# Patient Record
Sex: FEMALE | Race: WHITE | Employment: OTHER | URBAN - METROPOLITAN AREA
[De-identification: names, ages, dates, MRNs, and addresses within clinical notes are randomized per-mention and may not be internally consistent; named-entity substitution may affect disease eponyms.]

---

## 2017-02-02 PROBLEM — E11.9 TYPE 2 DIABETES MELLITUS WITHOUT COMPLICATION, WITHOUT LONG-TERM CURRENT USE OF INSULIN (HCC): Status: ACTIVE | Noted: 2017-02-02

## 2017-08-03 PROBLEM — E66.01 MORBID OBESITY WITH BMI OF 40.0-44.9, ADULT (HCC): Status: ACTIVE | Noted: 2017-08-03

## 2018-02-08 PROBLEM — M1A.00X0 IDIOPATHIC CHRONIC GOUT WITHOUT TOPHUS: Status: ACTIVE | Noted: 2018-02-08

## 2018-02-08 PROBLEM — I10 ESSENTIAL HYPERTENSION: Status: ACTIVE | Noted: 2018-02-08

## 2018-04-26 ENCOUNTER — TELEPHONE (OUTPATIENT)
Dept: FAMILY MEDICINE CLINIC | Age: 63
End: 2018-04-26

## 2018-04-26 DIAGNOSIS — R10.30 LOWER ABDOMINAL PAIN: ICD-10-CM

## 2018-04-26 DIAGNOSIS — R19.5 POSITIVE FIT (FECAL IMMUNOCHEMICAL TEST): ICD-10-CM

## 2018-04-26 DIAGNOSIS — Z85.43 HISTORY OF OVARIAN CANCER: ICD-10-CM

## 2018-04-26 DIAGNOSIS — R10.30 LOWER ABDOMINAL PAIN: Primary | ICD-10-CM

## 2018-04-26 DIAGNOSIS — R19.5 POSITIVE FIT (FECAL IMMUNOCHEMICAL TEST): Primary | ICD-10-CM

## 2018-04-26 DIAGNOSIS — Z85.43 HX OF OVARIAN CANCER: ICD-10-CM

## 2018-04-26 DIAGNOSIS — Z12.11 SCREENING FOR COLORECTAL CANCER: ICD-10-CM

## 2018-04-26 DIAGNOSIS — Z12.12 SCREENING FOR COLORECTAL CANCER: ICD-10-CM

## 2018-04-26 LAB
CONTROL: PRESENT
HEMOCCULT STL QL: POSITIVE

## 2018-04-26 PROCEDURE — 82274 ASSAY TEST FOR BLOOD FECAL: CPT | Performed by: FAMILY MEDICINE

## 2018-04-30 ENCOUNTER — OFFICE VISIT (OUTPATIENT)
Dept: FAMILY MEDICINE CLINIC | Age: 63
End: 2018-04-30
Payer: COMMERCIAL

## 2018-04-30 VITALS
BODY MASS INDEX: 41.65 KG/M2 | OXYGEN SATURATION: 98 % | HEART RATE: 74 BPM | WEIGHT: 250 LBS | SYSTOLIC BLOOD PRESSURE: 132 MMHG | HEIGHT: 65 IN | DIASTOLIC BLOOD PRESSURE: 80 MMHG | RESPIRATION RATE: 20 BRPM

## 2018-04-30 DIAGNOSIS — R19.5 POSITIVE FIT (FECAL IMMUNOCHEMICAL TEST): Primary | ICD-10-CM

## 2018-04-30 PROCEDURE — 99213 OFFICE O/P EST LOW 20 MIN: CPT | Performed by: FAMILY MEDICINE

## 2018-04-30 ASSESSMENT — ENCOUNTER SYMPTOMS
NAUSEA: 0
BLOOD IN STOOL: 0
ABDOMINAL PAIN: 0
DIARRHEA: 0
VOMITING: 0
SHORTNESS OF BREATH: 0

## 2018-05-30 ENCOUNTER — ANESTHESIA EVENT (OUTPATIENT)
Dept: ENDOSCOPY | Age: 63
End: 2018-05-30
Payer: COMMERCIAL

## 2018-05-30 ENCOUNTER — HOSPITAL ENCOUNTER (OUTPATIENT)
Age: 63
Setting detail: OUTPATIENT SURGERY
Discharge: HOME | End: 2018-05-30
Attending: INTERNAL MEDICINE | Admitting: INTERNAL MEDICINE
Payer: COMMERCIAL

## 2018-05-30 ENCOUNTER — ANESTHESIA (OUTPATIENT)
Dept: ENDOSCOPY | Age: 63
End: 2018-05-30
Payer: COMMERCIAL

## 2018-05-30 VITALS
WEIGHT: 250 LBS | HEART RATE: 67 BPM | TEMPERATURE: 97.8 F | BODY MASS INDEX: 41.65 KG/M2 | OXYGEN SATURATION: 96 % | HEIGHT: 65 IN | SYSTOLIC BLOOD PRESSURE: 141 MMHG | RESPIRATION RATE: 16 BRPM | DIASTOLIC BLOOD PRESSURE: 67 MMHG

## 2018-05-30 VITALS
OXYGEN SATURATION: 99 % | RESPIRATION RATE: 17 BRPM | DIASTOLIC BLOOD PRESSURE: 79 MMHG | SYSTOLIC BLOOD PRESSURE: 158 MMHG

## 2018-05-30 LAB — METER GLUCOSE: 155 MG/DL (ref 70–110)

## 2018-05-30 PROCEDURE — 6360000002 HC RX W HCPCS: Performed by: NURSE ANESTHETIST, CERTIFIED REGISTERED

## 2018-05-30 PROCEDURE — 2580000003 HC RX 258: Performed by: ANESTHESIOLOGY

## 2018-05-30 PROCEDURE — 7100000011 HC PHASE II RECOVERY - ADDTL 15 MIN: Performed by: INTERNAL MEDICINE

## 2018-05-30 PROCEDURE — 3700000001 HC ADD 15 MINUTES (ANESTHESIA): Performed by: INTERNAL MEDICINE

## 2018-05-30 PROCEDURE — 2709999900 HC NON-CHARGEABLE SUPPLY: Performed by: INTERNAL MEDICINE

## 2018-05-30 PROCEDURE — 82962 GLUCOSE BLOOD TEST: CPT

## 2018-05-30 PROCEDURE — 7100000010 HC PHASE II RECOVERY - FIRST 15 MIN: Performed by: INTERNAL MEDICINE

## 2018-05-30 PROCEDURE — 3700000000 HC ANESTHESIA ATTENDED CARE: Performed by: INTERNAL MEDICINE

## 2018-05-30 PROCEDURE — 2720000010 HC SURG SUPPLY STERILE: Performed by: INTERNAL MEDICINE

## 2018-05-30 PROCEDURE — 3609027000 HC COLONOSCOPY: Performed by: INTERNAL MEDICINE

## 2018-05-30 PROCEDURE — C1773 RET DEV, INSERTABLE: HCPCS | Performed by: INTERNAL MEDICINE

## 2018-05-30 RX ORDER — SODIUM CHLORIDE 0.9 % (FLUSH) 0.9 %
10 SYRINGE (ML) INJECTION EVERY 12 HOURS SCHEDULED
Status: DISCONTINUED | OUTPATIENT
Start: 2018-05-30 | End: 2018-05-30 | Stop reason: HOSPADM

## 2018-05-30 RX ORDER — SODIUM CHLORIDE 9 MG/ML
INJECTION, SOLUTION INTRAVENOUS CONTINUOUS
Status: DISCONTINUED | OUTPATIENT
Start: 2018-05-30 | End: 2018-05-30 | Stop reason: HOSPADM

## 2018-05-30 RX ORDER — PROPOFOL 10 MG/ML
INJECTION, EMULSION INTRAVENOUS PRN
Status: DISCONTINUED | OUTPATIENT
Start: 2018-05-30 | End: 2018-05-30 | Stop reason: SDUPTHER

## 2018-05-30 RX ORDER — SODIUM CHLORIDE 0.9 % (FLUSH) 0.9 %
10 SYRINGE (ML) INJECTION PRN
Status: DISCONTINUED | OUTPATIENT
Start: 2018-05-30 | End: 2018-05-30 | Stop reason: HOSPADM

## 2018-05-30 RX ADMIN — PROPOFOL 400 MG: 10 INJECTION, EMULSION INTRAVENOUS at 08:45

## 2018-05-30 RX ADMIN — SODIUM CHLORIDE: 9 INJECTION, SOLUTION INTRAVENOUS at 08:16

## 2018-05-30 RX ADMIN — SODIUM CHLORIDE: 9 INJECTION, SOLUTION INTRAVENOUS at 07:24

## 2018-05-30 ASSESSMENT — PAIN SCALES - GENERAL
PAINLEVEL_OUTOF10: 0
PAINLEVEL_OUTOF10: 0

## 2018-08-23 ENCOUNTER — OFFICE VISIT (OUTPATIENT)
Dept: FAMILY MEDICINE CLINIC | Age: 63
End: 2018-08-23
Payer: COMMERCIAL

## 2018-08-23 VITALS
SYSTOLIC BLOOD PRESSURE: 138 MMHG | HEART RATE: 78 BPM | DIASTOLIC BLOOD PRESSURE: 78 MMHG | BODY MASS INDEX: 41.6 KG/M2 | OXYGEN SATURATION: 98 % | RESPIRATION RATE: 20 BRPM | HEIGHT: 65 IN

## 2018-08-23 DIAGNOSIS — E03.9 HYPOTHYROIDISM (ACQUIRED): ICD-10-CM

## 2018-08-23 DIAGNOSIS — E11.9 TYPE 2 DIABETES MELLITUS WITHOUT COMPLICATION, WITHOUT LONG-TERM CURRENT USE OF INSULIN (HCC): Primary | ICD-10-CM

## 2018-08-23 DIAGNOSIS — I10 ESSENTIAL HYPERTENSION: ICD-10-CM

## 2018-08-23 LAB — HBA1C MFR BLD: 7.5 %

## 2018-08-23 PROCEDURE — 99214 OFFICE O/P EST MOD 30 MIN: CPT | Performed by: FAMILY MEDICINE

## 2018-08-23 PROCEDURE — 83036 HEMOGLOBIN GLYCOSYLATED A1C: CPT | Performed by: FAMILY MEDICINE

## 2018-08-23 RX ORDER — METRONIDAZOLE 7.5 MG/G
LOTION TOPICAL
Qty: 59 ML | Refills: 5 | Status: SHIPPED | OUTPATIENT
Start: 2018-08-23 | End: 2018-10-03 | Stop reason: SDUPTHER

## 2018-08-23 ASSESSMENT — PATIENT HEALTH QUESTIONNAIRE - PHQ9
2. FEELING DOWN, DEPRESSED OR HOPELESS: 0
SUM OF ALL RESPONSES TO PHQ QUESTIONS 1-9: 0
SUM OF ALL RESPONSES TO PHQ QUESTIONS 1-9: 0
SUM OF ALL RESPONSES TO PHQ9 QUESTIONS 1 & 2: 0
1. LITTLE INTEREST OR PLEASURE IN DOING THINGS: 0

## 2018-08-23 ASSESSMENT — ENCOUNTER SYMPTOMS
DIARRHEA: 0
SHORTNESS OF BREATH: 0
VOMITING: 0
NAUSEA: 0

## 2018-08-23 NOTE — PROGRESS NOTES
200 mcg by mouth daily 400 mcg Monday-Thursday, 200 mcg other days      losartan-hydrochlorothiazide (HYZAAR) 100-25 MG per tablet   Take 1 tablet by mouth daily   0    vitamin D (ERGOCALCIFEROL) 21450 UNITS CAPS capsule   Take 50,000 Units by mouth once a week   0    FREESTYLE LITE strip   0    FREESTYLE LANCETS MISC   1    atorvastatin (LIPITOR) 20 MG tablet   Take 20 mg by mouth daily        No current facility-administered medications for this visit. Wt Readings from Last 3 Encounters:   05/29/18 250 lb (113.4 kg)   04/30/18 250 lb (113.4 kg)   02/08/18 250 lb (113.4 kg)     /78 (Site: Left Arm, Cuff Size: Large Adult)   Pulse 78   Resp 20   Ht 5' 5\" (1.651 m)   SpO2 98%   BMI 41.60 kg/m²     Review of Systems   Eyes: Negative for visual disturbance. Respiratory: Negative for shortness of breath. Cardiovascular: Negative for chest pain, palpitations and leg swelling. Gastrointestinal: Negative for diarrhea, nausea and vomiting. Genitourinary: Negative for difficulty urinating, dysuria and frequency. Skin: Positive for rash (eczema on ankles). Psychiatric/Behavioral: Negative for dysphoric mood. Physical Exam   Constitutional: She is oriented to person, place, and time. She appears well-developed and well-nourished. No distress. Neck: Neck supple. Carotid bruit is not present. Cardiovascular: Normal rate, regular rhythm and normal heart sounds. Exam reveals no gallop. No murmur heard. Pulmonary/Chest: Effort normal and breath sounds normal. She has no wheezes. She has no rales. Abdominal: Soft. Bowel sounds are normal. She exhibits no distension. There is no tenderness. Musculoskeletal: She exhibits no edema. Neurological: She is alert and oriented to person, place, and time. Skin: Skin is warm and dry. Psychiatric: She has a normal mood and affect. Vitals reviewed.     Results for orders placed or performed in visit on 08/23/18   POCT glycosylated is important in assessing for undiagnosed pathology, especially cancer, as well as protecting against potentially harmful/life threatening disease. Patient and/or guardian verbalizes understanding and agrees with above counseling, assessment and plan. All questions answered.

## 2018-09-12 ENCOUNTER — OFFICE VISIT (OUTPATIENT)
Dept: FAMILY MEDICINE CLINIC | Age: 63
End: 2018-09-12
Payer: COMMERCIAL

## 2018-09-12 VITALS
RESPIRATION RATE: 20 BRPM | BODY MASS INDEX: 41.6 KG/M2 | OXYGEN SATURATION: 98 % | SYSTOLIC BLOOD PRESSURE: 138 MMHG | DIASTOLIC BLOOD PRESSURE: 88 MMHG | HEIGHT: 65 IN | HEART RATE: 75 BPM

## 2018-09-12 DIAGNOSIS — M89.8X6 PAIN OF RIGHT TIBIA: Primary | ICD-10-CM

## 2018-09-12 PROCEDURE — 99213 OFFICE O/P EST LOW 20 MIN: CPT | Performed by: FAMILY MEDICINE

## 2018-09-12 ASSESSMENT — ENCOUNTER SYMPTOMS: SHORTNESS OF BREATH: 0

## 2018-09-12 NOTE — PROGRESS NOTES
[address] [facility], [address]   [facility]   [address] [place] [address] MD Audi     Patient: Joey Florentino  YOB: 1955  Visit Date: 9/12/18    Radha Laughlin is a 61y.o. year old female here today for   Chief Complaint   Patient presents with    Leg Pain     right shin pain x 3 weeks, denies injury wants xray       HPI  Leg Pain    Incident onset: Started 3 weeks ago. There was no injury mechanism. The pain is present in the right leg. The quality of the pain is described as stabbing. The pain is severe. The pain has been fluctuating (Pain is severe when standing after prolonged rest, then  improves after taking more than 15 steps) since onset. Pertinent negatives include no inability to bear weight, muscle weakness, numbness or tingling. Treatments tried: feels better with movement. Review of Systems   Respiratory: Negative for shortness of breath. Cardiovascular: Negative for chest pain. Musculoskeletal: Positive for gait problem. Negative for arthralgias and joint swelling. Neurological: Negative for tingling and numbness. Past medical, surgical, social and/or family history reviewed, updated as needed, and are non-contributory (unless otherwise stated). Medications, allergies, and problem list also reviewed and updated as needed in patient's record.      Current Outpatient Prescriptions   Medication Sig Dispense Refill    losartan-hydrochlorothiazide (HYZAAR) 100-25 MG per tablet Take 1 tablet by mouth daily 90 tablet 1    METRONIDAZOLE, TOPICAL, 0.75 % LOTN Apply to face BID 59 mL 5    metFORMIN (GLUCOPHAGE) 500 MG tablet take 1 tablet in the morning and 3 tablets in the evening ( 4 tablets daily)      indomethacin (INDOCIN SR) 75 MG extended release capsule Take 1 capsule by mouth 2 times daily (with meals) 180 capsule 3    allopurinol (ZYLOPRIM) 100 MG tablet Take 1 tablet by mouth daily 90 tablet 3    triamcinolone (KENALOG) 0.1 % cream Apply bid to affected areas prn 453.6 g 3    Handicap Placard MISC by Does not apply route Unable to ambulate more than 60 feet without difficulty  Expires 10/31/2022 1 each 0    ketoconazole (NIZORAL) 2 % cream Apply topically daily. 60 g 5    levothyroxine (SYNTHROID) 200 MCG tablet Take 200 mcg by mouth daily 400 mcg Monday-Thursday, 200 mcg other days      vitamin D (ERGOCALCIFEROL) 11738 UNITS CAPS capsule   Take 50,000 Units by mouth once a week   0    FREESTYLE LITE strip   0    FREESTYLE LANCETS MISC   1    atorvastatin (LIPITOR) 20 MG tablet   Take 20 mg by mouth daily        No current facility-administered medications for this visit. Wt Readings from Last 3 Encounters:   05/29/18 250 lb (113.4 kg)   04/30/18 250 lb (113.4 kg)   02/08/18 250 lb (113.4 kg)                   Vitals:    09/12/18 0957   BP: 138/88   Pulse: 75   Resp: 20   SpO2: 98%       Physical Exam   Constitutional: She is oriented to person, place, and time. She appears well-developed and well-nourished. Neck: Neck supple. Cardiovascular: Normal rate, regular rhythm and normal heart sounds. Exam reveals no gallop. No murmur heard. Pulmonary/Chest: Effort normal and breath sounds normal. She has no wheezes. She has no rales. Abdominal: Soft. Bowel sounds are normal. She exhibits no distension. There is no tenderness. Musculoskeletal: She exhibits tenderness (right lower leg anteriorly). She exhibits no edema. Neurological: She is alert and oriented to person, place, and time. Skin: Skin is warm and dry. Psychiatric: She has a normal mood and affect. Vitals reviewed. ASSESSMENT/PLAN  Fauzia Mendez was seen today for leg pain. Diagnoses and all orders for this visit:    Pain of right tibia  -     XR TIBIA FIBULA RIGHT (2 VIEWS); Future          BMI was elevated today, and weight loss plan recommended is : conventional weight loss.     Phone/MyChart follow up if tests abnormal.    Return in about 1 week (around 9/19/2018). or sooner if necessary. I have reviewed my findings and recommendations with Anita Koehler.      Judah Nascimento M.D

## 2018-09-17 ENCOUNTER — PATIENT MESSAGE (OUTPATIENT)
Dept: FAMILY MEDICINE CLINIC | Age: 63
End: 2018-09-17

## 2018-09-17 DIAGNOSIS — Z96.659 LOOSENING OF KNEE JOINT PROSTHESIS, INITIAL ENCOUNTER (HCC): Primary | ICD-10-CM

## 2018-09-17 DIAGNOSIS — T84.038A LOOSENING OF KNEE JOINT PROSTHESIS, INITIAL ENCOUNTER (HCC): Primary | ICD-10-CM

## 2018-09-17 NOTE — TELEPHONE ENCOUNTER
From: Isidro Vasquez  To: Jenny Ho MD  Sent: 9/17/2018 5:12 PM EDT  Subject: Non-Urgent Medical Question    Hi, I would like to get a second opinion about my knee from Dr. Amena Jennings) at their [place] office. They need a referral from [facility] and I think a copy of my xray. Can you please relay this message to her. Thank you.

## 2018-10-03 DIAGNOSIS — M1A.00X0 IDIOPATHIC CHRONIC GOUT WITHOUT TOPHUS, UNSPECIFIED SITE: ICD-10-CM

## 2018-10-03 DIAGNOSIS — E03.9 HYPOTHYROIDISM (ACQUIRED): ICD-10-CM

## 2018-10-03 DIAGNOSIS — I10 HYPERTENSION, UNSPECIFIED TYPE: ICD-10-CM

## 2018-10-03 RX ORDER — LEVOTHYROXINE SODIUM 0.2 MG/1
200 TABLET ORAL DAILY
Qty: 90 TABLET | Refills: 1 | Status: SHIPPED | OUTPATIENT
Start: 2018-10-03 | End: 2018-10-08 | Stop reason: SDUPTHER

## 2018-10-03 RX ORDER — INDOMETHACIN 75 MG/1
75 CAPSULE, EXTENDED RELEASE ORAL 2 TIMES DAILY WITH MEALS
Qty: 180 CAPSULE | Refills: 3 | Status: SHIPPED | OUTPATIENT
Start: 2018-10-03 | End: 2019-03-07 | Stop reason: SDUPTHER

## 2018-10-03 RX ORDER — LANCETS 28 GAUGE
EACH MISCELLANEOUS
Qty: 150 EACH | Refills: 3 | Status: SHIPPED | OUTPATIENT
Start: 2018-10-03 | End: 2019-03-07 | Stop reason: SDUPTHER

## 2018-10-03 RX ORDER — TRIAMCINOLONE ACETONIDE 1 MG/G
CREAM TOPICAL
Qty: 1200 G | Refills: 3 | Status: SHIPPED | OUTPATIENT
Start: 2018-10-03 | End: 2019-03-07 | Stop reason: SDUPTHER

## 2018-10-03 RX ORDER — ERGOCALCIFEROL 1.25 MG/1
50000 CAPSULE ORAL WEEKLY
Qty: 12 CAPSULE | Refills: 1 | Status: SHIPPED | OUTPATIENT
Start: 2018-10-03

## 2018-10-03 RX ORDER — ATORVASTATIN CALCIUM 20 MG/1
20 TABLET, FILM COATED ORAL DAILY
Qty: 90 TABLET | Refills: 1 | Status: SHIPPED | OUTPATIENT
Start: 2018-10-03 | End: 2018-10-26 | Stop reason: SDUPTHER

## 2018-10-03 RX ORDER — ALLOPURINOL 100 MG/1
100 TABLET ORAL DAILY
Qty: 90 TABLET | Refills: 3 | Status: SHIPPED | OUTPATIENT
Start: 2018-10-03 | End: 2019-03-07 | Stop reason: SDUPTHER

## 2018-10-03 RX ORDER — METRONIDAZOLE 7.5 MG/G
LOTION TOPICAL
Qty: 3 BOTTLE | Refills: 3 | Status: SHIPPED | OUTPATIENT
Start: 2018-10-03 | End: 2019-06-18 | Stop reason: SDUPTHER

## 2018-10-03 RX ORDER — LOSARTAN POTASSIUM AND HYDROCHLOROTHIAZIDE 25; 100 MG/1; MG/1
1 TABLET ORAL DAILY
Qty: 90 TABLET | Refills: 1 | Status: SHIPPED | OUTPATIENT
Start: 2018-10-03 | End: 2019-03-07 | Stop reason: SDUPTHER

## 2018-10-05 ENCOUNTER — OFFICE VISIT (OUTPATIENT)
Dept: FAMILY MEDICINE CLINIC | Age: 63
End: 2018-10-05
Payer: COMMERCIAL

## 2018-10-05 ENCOUNTER — TELEPHONE (OUTPATIENT)
Dept: ADMINISTRATIVE | Age: 63
End: 2018-10-05

## 2018-10-05 ENCOUNTER — HOSPITAL ENCOUNTER (OUTPATIENT)
Age: 63
Discharge: HOME | End: 2018-10-07
Payer: COMMERCIAL

## 2018-10-05 VITALS
HEART RATE: 81 BPM | DIASTOLIC BLOOD PRESSURE: 82 MMHG | RESPIRATION RATE: 18 BRPM | OXYGEN SATURATION: 94 % | BODY MASS INDEX: 41.65 KG/M2 | WEIGHT: 250 LBS | HEIGHT: 65 IN | SYSTOLIC BLOOD PRESSURE: 122 MMHG

## 2018-10-05 DIAGNOSIS — N39.0 URINARY TRACT INFECTION WITHOUT HEMATURIA, SITE UNSPECIFIED: ICD-10-CM

## 2018-10-05 DIAGNOSIS — R30.0 DYSURIA: ICD-10-CM

## 2018-10-05 DIAGNOSIS — N39.0 URINARY TRACT INFECTION WITHOUT HEMATURIA, SITE UNSPECIFIED: Primary | ICD-10-CM

## 2018-10-05 LAB
BILIRUBIN URINE: NEGATIVE
BILIRUBIN, POC: NEGATIVE
BLOOD URINE, POC: NEGATIVE
BLOOD, URINE: NEGATIVE
CLARITY, POC: NORMAL
CLARITY: CLEAR
COLOR, POC: NORMAL
COLOR: YELLOW
GLUCOSE URINE, POC: NEGATIVE
GLUCOSE URINE: NEGATIVE MG/DL
KETONES, POC: NEGATIVE
KETONES, URINE: NEGATIVE MG/DL
LEUKOCYTE EST, POC: NEGATIVE
LEUKOCYTE ESTERASE, URINE: NEGATIVE
NITRITE, POC: NEGATIVE
NITRITE, URINE: NEGATIVE
PH UA: 8 (ref 5–9)
PH, POC: 6.5
PROTEIN UA: NEGATIVE MG/DL
PROTEIN, POC: NEGATIVE
SPECIFIC GRAVITY UA: 1.01 (ref 1–1.03)
SPECIFIC GRAVITY, POC: 1.01
UROBILINOGEN, POC: 0.2
UROBILINOGEN, URINE: 0.2 E.U./DL

## 2018-10-05 PROCEDURE — 87088 URINE BACTERIA CULTURE: CPT

## 2018-10-05 PROCEDURE — 81002 URINALYSIS NONAUTO W/O SCOPE: CPT | Performed by: FAMILY MEDICINE

## 2018-10-05 PROCEDURE — 99213 OFFICE O/P EST LOW 20 MIN: CPT | Performed by: FAMILY MEDICINE

## 2018-10-05 PROCEDURE — 81003 URINALYSIS AUTO W/O SCOPE: CPT | Performed by: FAMILY MEDICINE

## 2018-10-05 PROCEDURE — 3014F SCREEN MAMMO DOC REV: CPT | Performed by: FAMILY MEDICINE

## 2018-10-05 PROCEDURE — 81003 URINALYSIS AUTO W/O SCOPE: CPT

## 2018-10-05 PROCEDURE — 3017F COLORECTAL CA SCREEN DOC REV: CPT | Performed by: FAMILY MEDICINE

## 2018-10-05 PROCEDURE — G8417 CALC BMI ABV UP PARAM F/U: HCPCS | Performed by: FAMILY MEDICINE

## 2018-10-05 PROCEDURE — G8427 DOCREV CUR MEDS BY ELIG CLIN: HCPCS | Performed by: FAMILY MEDICINE

## 2018-10-05 PROCEDURE — G8484 FLU IMMUNIZE NO ADMIN: HCPCS | Performed by: FAMILY MEDICINE

## 2018-10-05 PROCEDURE — 1036F TOBACCO NON-USER: CPT | Performed by: FAMILY MEDICINE

## 2018-10-05 RX ORDER — SULFAMETHOXAZOLE AND TRIMETHOPRIM 800; 160 MG/1; MG/1
1 TABLET ORAL 2 TIMES DAILY
Qty: 6 TABLET | Refills: 0 | Status: SHIPPED | OUTPATIENT
Start: 2018-10-05 | End: 2018-10-08

## 2018-10-05 ASSESSMENT — ENCOUNTER SYMPTOMS
SHORTNESS OF BREATH: 0
ABDOMINAL PAIN: 0
VOMITING: 0
NAUSEA: 0

## 2018-10-05 NOTE — PROGRESS NOTES
NEGATIVE        ASSESSMENT/PLAN  Mirza Green was seen today for dysuria. Diagnoses and all orders for this visit:    Urinary tract infection without hematuria, site unspecified  -     Urine Culture; Future  -     Urinalysis; Future  -     sulfamethoxazole-trimethoprim (BACTRIM DS) 800-160 MG per tablet; Take 1 tablet by mouth 2 times daily for 3 days    Dysuria  -     POCT Urinalysis no Micro            Phone/MyChart follow up if tests abnormal.    No Follow-up on file. or sooner if necessary. I have reviewed my findings and recommendations with Mirza Norma. Jose Lindquist.  Alecia Rose M.D

## 2018-10-07 LAB — URINE CULTURE, ROUTINE: NORMAL

## 2018-10-08 DIAGNOSIS — E03.9 HYPOTHYROIDISM (ACQUIRED): ICD-10-CM

## 2018-10-08 RX ORDER — LEVOTHYROXINE SODIUM 200 MCG
200 TABLET ORAL DAILY
Qty: 180 TABLET | Refills: 1 | Status: SHIPPED | OUTPATIENT
Start: 2018-10-08 | End: 2019-03-07 | Stop reason: SDUPTHER

## 2018-10-08 RX ORDER — LEVOTHYROXINE SODIUM 200 MCG
200 TABLET ORAL DAILY
Qty: 180 TABLET | Refills: 1 | Status: SHIPPED | OUTPATIENT
Start: 2018-10-08 | End: 2018-10-08 | Stop reason: SDUPTHER

## 2018-10-08 RX ORDER — LEVOTHYROXINE SODIUM 200 MCG
200 TABLET ORAL DAILY
Qty: 90 TABLET | Refills: 1 | Status: SHIPPED | OUTPATIENT
Start: 2018-10-08 | End: 2018-10-08 | Stop reason: SDUPTHER

## 2018-10-26 RX ORDER — ATORVASTATIN CALCIUM 20 MG/1
20 TABLET, FILM COATED ORAL DAILY
Qty: 90 TABLET | Refills: 1 | Status: SHIPPED | OUTPATIENT
Start: 2018-10-26 | End: 2019-03-07 | Stop reason: SDUPTHER

## 2018-11-19 LAB
ALBUMIN SERPL-MCNC: 4.1 G/DL
ALP BLD-CCNC: 209 U/L
ALT SERPL-CCNC: 51 U/L
ANION GAP SERPL CALCULATED.3IONS-SCNC: 1.3 MMOL/L
AST SERPL-CCNC: 47 U/L
BASOPHILS ABSOLUTE: 0 /ΜL
BASOPHILS RELATIVE PERCENT: 0 %
BILIRUB SERPL-MCNC: 0.3 MG/DL (ref 0.1–1.4)
BUN BLDV-MCNC: 19 MG/DL
CALCIUM SERPL-MCNC: 9.5 MG/DL
CHLORIDE BLD-SCNC: 99 MMOL/L
CHOLESTEROL, TOTAL: 150 MG/DL
CHOLESTEROL/HDL RATIO: NORMAL
CO2: 21 MMOL/L
CREAT SERPL-MCNC: 0.77 MG/DL
EOSINOPHILS ABSOLUTE: 0.1 /ΜL
EOSINOPHILS RELATIVE PERCENT: 3 %
GFR CALCULATED: 82
GLUCOSE BLD-MCNC: 160 MG/DL
HCT VFR BLD CALC: 36.2 % (ref 36–46)
HDLC SERPL-MCNC: 37 MG/DL (ref 35–70)
HEMOGLOBIN: 11.7 G/DL (ref 12–16)
LDL CHOLESTEROL CALCULATED: 80 MG/DL (ref 0–160)
LYMPHOCYTES ABSOLUTE: 1.2 /ΜL
LYMPHOCYTES RELATIVE PERCENT: 44 %
MCH RBC QN AUTO: 25.4 PG
MCHC RBC AUTO-ENTMCNC: 32.3 G/DL
MCV RBC AUTO: 79 FL
MONOCYTES ABSOLUTE: 0.4 /ΜL
MONOCYTES RELATIVE PERCENT: 16 %
NEUTROPHILS ABSOLUTE: 1 /ΜL
NEUTROPHILS RELATIVE PERCENT: 37 %
PLATELET # BLD: 142 K/ΜL
PMV BLD AUTO: ABNORMAL FL
POTASSIUM SERPL-SCNC: 4.7 MMOL/L
RBC # BLD: 4.6 10^6/ΜL
SODIUM BLD-SCNC: 140 MMOL/L
TOTAL PROTEIN: 7.3
TRIGL SERPL-MCNC: 167 MG/DL
TSH SERPL DL<=0.05 MIU/L-ACNC: 0.73 UIU/ML
VLDLC SERPL CALC-MCNC: 33 MG/DL
WBC # BLD: 2.7 10^3/ML

## 2018-11-29 ENCOUNTER — OFFICE VISIT (OUTPATIENT)
Dept: FAMILY MEDICINE CLINIC | Age: 63
End: 2018-11-29
Payer: COMMERCIAL

## 2018-11-29 VITALS
DIASTOLIC BLOOD PRESSURE: 84 MMHG | SYSTOLIC BLOOD PRESSURE: 134 MMHG | RESPIRATION RATE: 20 BRPM | BODY MASS INDEX: 41.6 KG/M2 | HEIGHT: 65 IN | HEART RATE: 76 BPM

## 2018-11-29 DIAGNOSIS — E03.9 HYPOTHYROIDISM (ACQUIRED): ICD-10-CM

## 2018-11-29 DIAGNOSIS — I10 ESSENTIAL HYPERTENSION: ICD-10-CM

## 2018-11-29 DIAGNOSIS — E11.9 TYPE 2 DIABETES MELLITUS WITHOUT COMPLICATION, WITHOUT LONG-TERM CURRENT USE OF INSULIN (HCC): Primary | ICD-10-CM

## 2018-11-29 LAB
CREATININE URINE POCT: 100
HBA1C MFR BLD: 7.6 %
MICROALBUMIN/CREAT 24H UR: 10 MG/G{CREAT}
MICROALBUMIN/CREAT UR-RTO: <30

## 2018-11-29 PROCEDURE — 2022F DILAT RTA XM EVC RTNOPTHY: CPT | Performed by: FAMILY MEDICINE

## 2018-11-29 PROCEDURE — G8484 FLU IMMUNIZE NO ADMIN: HCPCS | Performed by: FAMILY MEDICINE

## 2018-11-29 PROCEDURE — 3017F COLORECTAL CA SCREEN DOC REV: CPT | Performed by: FAMILY MEDICINE

## 2018-11-29 PROCEDURE — 1036F TOBACCO NON-USER: CPT | Performed by: FAMILY MEDICINE

## 2018-11-29 PROCEDURE — 82044 UR ALBUMIN SEMIQUANTITATIVE: CPT | Performed by: FAMILY MEDICINE

## 2018-11-29 PROCEDURE — G8417 CALC BMI ABV UP PARAM F/U: HCPCS | Performed by: FAMILY MEDICINE

## 2018-11-29 PROCEDURE — 83036 HEMOGLOBIN GLYCOSYLATED A1C: CPT | Performed by: FAMILY MEDICINE

## 2018-11-29 PROCEDURE — 99214 OFFICE O/P EST MOD 30 MIN: CPT | Performed by: FAMILY MEDICINE

## 2018-11-29 PROCEDURE — 3045F PR MOST RECENT HEMOGLOBIN A1C LEVEL 7.0-9.0%: CPT | Performed by: FAMILY MEDICINE

## 2018-11-29 PROCEDURE — G8427 DOCREV CUR MEDS BY ELIG CLIN: HCPCS | Performed by: FAMILY MEDICINE

## 2018-11-29 PROCEDURE — 3014F SCREEN MAMMO DOC REV: CPT | Performed by: FAMILY MEDICINE

## 2018-11-29 ASSESSMENT — PATIENT HEALTH QUESTIONNAIRE - PHQ9
1. LITTLE INTEREST OR PLEASURE IN DOING THINGS: 0
SUM OF ALL RESPONSES TO PHQ9 QUESTIONS 1 & 2: 0
SUM OF ALL RESPONSES TO PHQ QUESTIONS 1-9: 0
2. FEELING DOWN, DEPRESSED OR HOPELESS: 0
SUM OF ALL RESPONSES TO PHQ QUESTIONS 1-9: 0

## 2018-11-29 ASSESSMENT — ENCOUNTER SYMPTOMS
NAUSEA: 0
VOMITING: 0
DIARRHEA: 0
SHORTNESS OF BREATH: 0

## 2018-11-29 NOTE — PROGRESS NOTES
OFFICE PROGRESS NOTE      SUBJECTIVE:        Patient ID:   Ney Nunes is a 61 y.o. female who presents for   Chief Complaint   Patient presents with    Diabetes       HPI:   Patient is here to follow up on diabetes. Fasting blood sugars:not checking  Midday blood sugars: not checking. Patient checks blood glucose 0 times per day. Patient is following diabetic diet. Patient is a nonsmoker. Last ophthalmology visit: 10/2017. Patient is taking a daily statin. Patient doing well on current regimen for hypothyroidism. Patient doing well on current regimen for hypertension. Prior to Admission medications    Medication Sig Start Date End Date Taking?  Authorizing Provider   atorvastatin (LIPITOR) 20 MG tablet Take 1 tablet by mouth daily 10/26/18  Yes Priscila Lo MD   metFORMIN (GLUCOPHAGE) 500 MG tablet Take 2 tablets by mouth 2 times daily (with meals) 10/8/18  Yes Priscila Lo MD   SYNTHROID 200 MCG tablet Take 1 tablet by mouth daily 400 mcg Monday-Thursday, 200 mcg other days 10/8/18  Yes Priscila Lo MD   losartan-hydrochlorothiazide [facility]) 100-25 MG per tablet Take 1 tablet by mouth daily 10/3/18  Yes Priscila Lo MD   indomethacin (INDOCIN SR) 75 MG extended release capsule Take 1 capsule by mouth 2 times daily (with meals) 10/3/18  Yes Priscila Lo MD   allopurinol (ZYLOPRIM) 100 MG tablet Take 1 tablet by mouth daily 10/3/18  Yes Priscila Lo MD   blood glucose test strips (FREESTYLE LITE) strip Check blood sugar qam 10/3/18  Yes Priscila Lo MD   FREESTYLE LANCETS MISC Check blood sugar qam 10/3/18  Yes Priscila Lo MD   triamcinolone (KENALOG) 0.1 % cream Apply bid to affected areas prn 10/3/18  Yes Priscila Lo MD   vitamin D (ERGOCALCIFEROL) 59586 units CAPS capsule Take 1 capsule by mouth once a week 10/3/18  Yes Priscila Lo MD   METRONIDAZOLE, TOPICAL, 0.75 % LOTN Apply to face BID 10/3/18  Yes Elvin Callahan MD   Handicap Placard MISC by Does not apply route Unable to ambulate more than 60 feet without difficulty  Expires 10/31/2022 10/25/17  Yes Elvin Callahan MD   ketoconazole (NIZORAL) 2 % cream Apply topically daily.  8/2/16  Yes Elvin Callahan MD     Social History     Social History    Marital status:      Spouse name: N/A    Number of children: N/A    Years of education: N/A     Social History Main Topics    Smoking status: Never Smoker    Smokeless tobacco: Never Used    Alcohol use Yes      Comment: social    Drug use: No    Sexual activity: Not Asked     Other Topics Concern    None     Social History Narrative    None       I have reviewed Gilma's allergies, medications, problem list, medical, social and family history and have updated as needed in the electronic medical record    Current Outpatient Prescriptions   Medication Sig Dispense Refill    atorvastatin (LIPITOR) 20 MG tablet Take 1 tablet by mouth daily 90 tablet 1    metFORMIN (GLUCOPHAGE) 500 MG tablet Take 2 tablets by mouth 2 times daily (with meals) 360 tablet 1    SYNTHROID 200 MCG tablet Take 1 tablet by mouth daily 400 mcg Monday-Thursday, 200 mcg other days 180 tablet 1    losartan-hydrochlorothiazide (HYZAAR) 100-25 MG per tablet Take 1 tablet by mouth daily 90 tablet 1    indomethacin (INDOCIN SR) 75 MG extended release capsule Take 1 capsule by mouth 2 times daily (with meals) 180 capsule 3    allopurinol (ZYLOPRIM) 100 MG tablet Take 1 tablet by mouth daily 90 tablet 3    blood glucose test strips (FREESTYLE LITE) strip Check blood sugar qam 150 each 3    FREESTYLE LANCETS [facility] Check blood sugar qam 150 each 3    triamcinolone (KENALOG) 0.1 % cream Apply bid to affected areas prn 1200 g 3    vitamin D (ERGOCALCIFEROL) 31730 units CAPS capsule Take 1 capsule by mouth once a week 12 capsule 1    METRONIDAZOLE, TOPICAL, 0.75 % LOTN Apply to face BID 3 Bottle 3    Handicap Placard MISC by Does not apply route Unable to ambulate more than 60 feet without difficulty  Expires 10/31/2022 1 each 0    ketoconazole (NIZORAL) 2 % cream Apply topically daily. 60 g 5     No current facility-administered medications for this visit. Review Of Systems:    Review of Systems   Eyes: Positive for visual disturbance (blurry vision in mornings). Respiratory: Negative for shortness of breath. Cardiovascular: Negative for chest pain, palpitations and leg swelling. Gastrointestinal: Negative for diarrhea, nausea and vomiting. Genitourinary: Negative for difficulty urinating, dysuria and frequency. Musculoskeletal: Positive for arthralgias and joint swelling. Skin: Negative for rash. Psychiatric/Behavioral: Negative for dysphoric mood. OBJECTIVE:     VS:  Wt Readings from Last 3 Encounters:   10/05/18 250 lb (113.4 kg)   05/29/18 250 lb (113.4 kg)   04/30/18 250 lb (113.4 kg)     Vitals:    11/29/18 0754   BP: 134/84   Pulse: 76   Resp: 20       Physical Exam   Constitutional: She is oriented to person, place, and time. She appears well-developed and well-nourished. No distress. Neck: Neck supple. Carotid bruit is not present. Cardiovascular: Normal rate, regular rhythm and normal heart sounds. Exam reveals no gallop. No murmur heard. Pulmonary/Chest: Effort normal and breath sounds normal. She has no wheezes. She has no rales. Abdominal: Soft. Bowel sounds are normal. She exhibits no distension. There is no tenderness. Musculoskeletal: She exhibits no edema. Neurological: She is alert and oriented to person, place, and time. Skin: Skin is warm and dry. Psychiatric: She has a normal mood and affect. Vitals reviewed.       Results for orders placed or performed in visit on 11/29/18   POCT glycosylated hemoglobin (Hb A1C)   Result Value Ref Range    Hemoglobin A1C 7.6 %   POCT

## 2019-03-07 ENCOUNTER — OFFICE VISIT (OUTPATIENT)
Dept: FAMILY MEDICINE CLINIC | Age: 64
End: 2019-03-07
Payer: COMMERCIAL

## 2019-03-07 ENCOUNTER — HOSPITAL ENCOUNTER (OUTPATIENT)
Age: 64
Discharge: HOME | End: 2019-03-09
Payer: COMMERCIAL

## 2019-03-07 VITALS
SYSTOLIC BLOOD PRESSURE: 138 MMHG | RESPIRATION RATE: 20 BRPM | HEIGHT: 63 IN | BODY MASS INDEX: 44.29 KG/M2 | OXYGEN SATURATION: 98 % | HEART RATE: 80 BPM | DIASTOLIC BLOOD PRESSURE: 78 MMHG

## 2019-03-07 DIAGNOSIS — E03.9 HYPOTHYROIDISM (ACQUIRED): ICD-10-CM

## 2019-03-07 DIAGNOSIS — E11.9 TYPE 2 DIABETES MELLITUS WITHOUT COMPLICATION, WITHOUT LONG-TERM CURRENT USE OF INSULIN (HCC): Primary | ICD-10-CM

## 2019-03-07 DIAGNOSIS — Z96.651 S/P REVISION OF TOTAL KNEE, RIGHT: ICD-10-CM

## 2019-03-07 DIAGNOSIS — I10 HYPERTENSION, UNSPECIFIED TYPE: ICD-10-CM

## 2019-03-07 LAB
HBA1C MFR BLD: 6.9 %
TSH SERPL DL<=0.05 MIU/L-ACNC: 1.34 UIU/ML (ref 0.27–4.2)

## 2019-03-07 PROCEDURE — G8484 FLU IMMUNIZE NO ADMIN: HCPCS | Performed by: FAMILY MEDICINE

## 2019-03-07 PROCEDURE — 3044F HG A1C LEVEL LT 7.0%: CPT | Performed by: FAMILY MEDICINE

## 2019-03-07 PROCEDURE — G8427 DOCREV CUR MEDS BY ELIG CLIN: HCPCS | Performed by: FAMILY MEDICINE

## 2019-03-07 PROCEDURE — 3017F COLORECTAL CA SCREEN DOC REV: CPT | Performed by: FAMILY MEDICINE

## 2019-03-07 PROCEDURE — 2022F DILAT RTA XM EVC RTNOPTHY: CPT | Performed by: FAMILY MEDICINE

## 2019-03-07 PROCEDURE — 83036 HEMOGLOBIN GLYCOSYLATED A1C: CPT | Performed by: FAMILY MEDICINE

## 2019-03-07 PROCEDURE — 36415 COLL VENOUS BLD VENIPUNCTURE: CPT

## 2019-03-07 PROCEDURE — 1036F TOBACCO NON-USER: CPT | Performed by: FAMILY MEDICINE

## 2019-03-07 PROCEDURE — 3014F SCREEN MAMMO DOC REV: CPT | Performed by: FAMILY MEDICINE

## 2019-03-07 PROCEDURE — G8417 CALC BMI ABV UP PARAM F/U: HCPCS | Performed by: FAMILY MEDICINE

## 2019-03-07 PROCEDURE — 99214 OFFICE O/P EST MOD 30 MIN: CPT | Performed by: FAMILY MEDICINE

## 2019-03-07 PROCEDURE — 84443 ASSAY THYROID STIM HORMONE: CPT

## 2019-03-07 RX ORDER — LOSARTAN POTASSIUM AND HYDROCHLOROTHIAZIDE 25; 100 MG/1; MG/1
1 TABLET ORAL DAILY
Qty: 90 TABLET | Refills: 1 | Status: SHIPPED | OUTPATIENT
Start: 2019-03-07 | End: 2019-09-09 | Stop reason: SDUPTHER

## 2019-03-07 RX ORDER — ATORVASTATIN CALCIUM 20 MG/1
20 TABLET, FILM COATED ORAL DAILY
Qty: 90 TABLET | Refills: 1 | Status: SHIPPED | OUTPATIENT
Start: 2019-03-07 | End: 2019-11-06 | Stop reason: SDUPTHER

## 2019-03-07 RX ORDER — INDOMETHACIN 75 MG/1
75 CAPSULE, EXTENDED RELEASE ORAL 2 TIMES DAILY WITH MEALS
Qty: 180 CAPSULE | Refills: 3 | Status: SHIPPED
Start: 2019-03-07 | End: 2020-04-30 | Stop reason: SDUPTHER

## 2019-03-07 RX ORDER — BLOOD-GLUCOSE METER
KIT MISCELLANEOUS
Qty: 1 DEVICE | Refills: 0 | Status: SHIPPED
Start: 2019-03-07 | End: 2021-08-16

## 2019-03-07 RX ORDER — LEVOTHYROXINE SODIUM 200 MCG
200 TABLET ORAL DAILY
Qty: 180 TABLET | Refills: 1 | Status: SHIPPED | OUTPATIENT
Start: 2019-03-07 | End: 2019-09-09 | Stop reason: SDUPTHER

## 2019-03-07 RX ORDER — LANCETS 28 GAUGE
EACH MISCELLANEOUS
Qty: 150 EACH | Refills: 3 | Status: SHIPPED
Start: 2019-03-07 | End: 2021-08-12 | Stop reason: SDUPTHER

## 2019-03-07 RX ORDER — TRIAMCINOLONE ACETONIDE 1 MG/G
CREAM TOPICAL
Qty: 1200 G | Refills: 3 | Status: SHIPPED
Start: 2019-03-07 | End: 2021-08-12 | Stop reason: SDUPTHER

## 2019-03-07 RX ORDER — ALLOPURINOL 100 MG/1
100 TABLET ORAL DAILY
Qty: 90 TABLET | Refills: 3 | Status: SHIPPED | OUTPATIENT
Start: 2019-03-07 | End: 2019-12-05 | Stop reason: SDUPTHER

## 2019-03-07 ASSESSMENT — PATIENT HEALTH QUESTIONNAIRE - PHQ9
2. FEELING DOWN, DEPRESSED OR HOPELESS: 0
SUM OF ALL RESPONSES TO PHQ QUESTIONS 1-9: 0
SUM OF ALL RESPONSES TO PHQ9 QUESTIONS 1 & 2: 0
SUM OF ALL RESPONSES TO PHQ QUESTIONS 1-9: 0
1. LITTLE INTEREST OR PLEASURE IN DOING THINGS: 0

## 2019-03-07 ASSESSMENT — ENCOUNTER SYMPTOMS
SHORTNESS OF BREATH: 0
NAUSEA: 0
DIARRHEA: 0
VOMITING: 0

## 2019-04-06 ENCOUNTER — HOSPITAL ENCOUNTER (EMERGENCY)
Age: 64
Discharge: HOME | End: 2019-04-06
Payer: COMMERCIAL

## 2019-04-06 ENCOUNTER — APPOINTMENT (OUTPATIENT)
Dept: GENERAL RADIOLOGY | Age: 64
End: 2019-04-06
Payer: COMMERCIAL

## 2019-04-06 VITALS
HEART RATE: 81 BPM | WEIGHT: 250 LBS | RESPIRATION RATE: 20 BRPM | DIASTOLIC BLOOD PRESSURE: 93 MMHG | OXYGEN SATURATION: 96 % | TEMPERATURE: 97.9 F | BODY MASS INDEX: 44.29 KG/M2 | SYSTOLIC BLOOD PRESSURE: 192 MMHG

## 2019-04-06 DIAGNOSIS — S86.911A STRAIN OF UNSPECIFIED MUSCLE(S) AND TENDON(S) AT LOWER LEG LEVEL, RIGHT LEG, INITIAL ENCOUNTER: Primary | ICD-10-CM

## 2019-04-06 PROCEDURE — 73590 X-RAY EXAM OF LOWER LEG: CPT

## 2019-04-06 PROCEDURE — 99212 OFFICE O/P EST SF 10 MIN: CPT

## 2019-04-06 ASSESSMENT — PAIN SCALES - GENERAL: PAINLEVEL_OUTOF10: 2

## 2019-04-06 ASSESSMENT — PAIN DESCRIPTION - FREQUENCY: FREQUENCY: INTERMITTENT

## 2019-04-06 ASSESSMENT — PAIN DESCRIPTION - PAIN TYPE: TYPE: ACUTE PAIN

## 2019-04-06 ASSESSMENT — PAIN DESCRIPTION - ORIENTATION: ORIENTATION: RIGHT

## 2019-04-06 ASSESSMENT — PAIN DESCRIPTION - LOCATION: LOCATION: KNEE

## 2019-04-06 NOTE — ED PROVIDER NOTES
This is a 60-year-old female that presents to urgent care complaining of right mid shin discomfort times one week. She does state a history of knee surgery in the past most recently December 2018. States last week she was cleaning and while cleaning she felt a pain in her right lower leg. Denies any numbness or tingling no fall. No ankle or hip pain. Denies any knee pain at this time specifically          Review of Systems   Constitutional:        Pertinent positives and negatives are stated within HPI, all other systems reviewed and are negative. Physical Exam   Constitutional: She is oriented to person, place, and time. She appears well-developed and well-nourished. HENT:   Head: Normocephalic and atraumatic. Right Ear: Hearing and external ear normal.   Left Ear: Hearing and external ear normal.   Nose: Nose normal.   Mouth/Throat: Uvula is midline, oropharynx is clear and moist and mucous membranes are normal.   Eyes: Pupils are equal, round, and reactive to light. Conjunctivae, EOM and lids are normal.   Neck: Normal range of motion. Neck supple. Cardiovascular: Normal rate, regular rhythm and normal heart sounds. No murmur heard. Pulmonary/Chest: Effort normal and breath sounds normal.   Abdominal: Soft. Bowel sounds are normal. There is no tenderness. There is no rigidity, no rebound, no guarding and no CVA tenderness. Musculoskeletal: She exhibits no edema. Right hip: Normal.        Right ankle: Normal. Achilles tendon normal.        Right lower leg: She exhibits tenderness. She exhibits no swelling, no edema, no deformity and no laceration. Tenderness to right mid shin no open area redness no deformity noted. No cyanosis. Neurological: She is alert and oriented to person, place, and time. She has normal strength. No cranial nerve deficit or sensory deficit. Coordination and gait normal. GCS eye subscore is 4. GCS verbal subscore is 5. GCS motor subscore is 6.    Skin: Skin is knee prosthesis in place without evidence of loosening or   infection. 2. Eccentric position of the long shaft of the tibial component, less   so than what was seen on the prior examination before the revision. No   evidence of fracture at this time however if a stress fracture   suspected, recommend bone scan.          ------------------------- NURSING NOTES AND VITALS REVIEWED ---------------------------   The nursing notes within the ED encounter and vital signs as below have been reviewed. BP (!) 192/93 Comment: has not taken meds yet  Pulse 81   Temp 97.9 °F (36.6 °C) (Oral)   Resp 20   Wt 250 lb (113.4 kg)   SpO2 96%   BMI 44.29 kg/m²   Oxygen Saturation Interpretation: Normal      ------------------------------------------ PROGRESS NOTES ------------------------------------------   I have spoken with the patient and discussed todays results, in addition to providing specific details for the plan of care and counseling regarding the diagnosis and prognosis. Their questions are answered at this time and they are agreeable with the plan.      --------------------------------- ADDITIONAL PROVIDER NOTES ---------------------------------     This patient is stable for discharge. I have shared the specific conditions for return, as well as the importance of follow-up. * NOTE: This report was transcribed using voice recognition software. Every effort was made to ensure accuracy; however, inadvertent computerized transcription errors may be present.    --------------------------------- IMPRESSION AND DISPOSITION ---------------------------------    IMPRESSION  1.  Strain of unspecified muscle(s) and tendon(s) at lower leg level, right leg, initial encounter        DISPOSITION  Disposition: Discharge to home  Patient condition is good            Cathi Desai PA-C  04/06/19 0642

## 2019-04-10 RX ORDER — KETOCONAZOLE 20 MG/G
CREAM TOPICAL
Qty: 60 G | Refills: 5 | Status: SHIPPED
Start: 2019-04-10 | End: 2020-06-08 | Stop reason: SDUPTHER

## 2019-06-18 ENCOUNTER — OFFICE VISIT (OUTPATIENT)
Dept: FAMILY MEDICINE CLINIC | Age: 64
End: 2019-06-18
Payer: COMMERCIAL

## 2019-06-18 VITALS
BODY MASS INDEX: 41.6 KG/M2 | HEIGHT: 65 IN | SYSTOLIC BLOOD PRESSURE: 132 MMHG | OXYGEN SATURATION: 96 % | HEART RATE: 78 BPM | DIASTOLIC BLOOD PRESSURE: 82 MMHG | RESPIRATION RATE: 20 BRPM

## 2019-06-18 DIAGNOSIS — I10 ESSENTIAL HYPERTENSION: ICD-10-CM

## 2019-06-18 DIAGNOSIS — E11.65 TYPE 2 DIABETES MELLITUS WITH HYPERGLYCEMIA, WITHOUT LONG-TERM CURRENT USE OF INSULIN (HCC): Primary | ICD-10-CM

## 2019-06-18 DIAGNOSIS — E03.9 HYPOTHYROIDISM (ACQUIRED): ICD-10-CM

## 2019-06-18 PROBLEM — R19.5 POSITIVE FIT (FECAL IMMUNOCHEMICAL TEST): Status: RESOLVED | Noted: 2018-04-26 | Resolved: 2019-06-18

## 2019-06-18 LAB — HBA1C MFR BLD: 7.3 %

## 2019-06-18 PROCEDURE — 1036F TOBACCO NON-USER: CPT | Performed by: FAMILY MEDICINE

## 2019-06-18 PROCEDURE — 83036 HEMOGLOBIN GLYCOSYLATED A1C: CPT | Performed by: FAMILY MEDICINE

## 2019-06-18 PROCEDURE — 99214 OFFICE O/P EST MOD 30 MIN: CPT | Performed by: FAMILY MEDICINE

## 2019-06-18 PROCEDURE — 3045F PR MOST RECENT HEMOGLOBIN A1C LEVEL 7.0-9.0%: CPT | Performed by: FAMILY MEDICINE

## 2019-06-18 PROCEDURE — 2022F DILAT RTA XM EVC RTNOPTHY: CPT | Performed by: FAMILY MEDICINE

## 2019-06-18 PROCEDURE — 3014F SCREEN MAMMO DOC REV: CPT | Performed by: FAMILY MEDICINE

## 2019-06-18 PROCEDURE — G8417 CALC BMI ABV UP PARAM F/U: HCPCS | Performed by: FAMILY MEDICINE

## 2019-06-18 PROCEDURE — G8427 DOCREV CUR MEDS BY ELIG CLIN: HCPCS | Performed by: FAMILY MEDICINE

## 2019-06-18 PROCEDURE — 3017F COLORECTAL CA SCREEN DOC REV: CPT | Performed by: FAMILY MEDICINE

## 2019-06-18 RX ORDER — METRONIDAZOLE 7.5 MG/G
LOTION TOPICAL
Qty: 3 BOTTLE | Refills: 3 | Status: SHIPPED
Start: 2019-06-18 | End: 2020-06-08 | Stop reason: SDUPTHER

## 2019-06-18 ASSESSMENT — PATIENT HEALTH QUESTIONNAIRE - PHQ9
SUM OF ALL RESPONSES TO PHQ QUESTIONS 1-9: 0
SUM OF ALL RESPONSES TO PHQ9 QUESTIONS 1 & 2: 0
SUM OF ALL RESPONSES TO PHQ QUESTIONS 1-9: 0
2. FEELING DOWN, DEPRESSED OR HOPELESS: 0
1. LITTLE INTEREST OR PLEASURE IN DOING THINGS: 0

## 2019-06-18 ASSESSMENT — ENCOUNTER SYMPTOMS
VOMITING: 0
NAUSEA: 0
SHORTNESS OF BREATH: 0
DIARRHEA: 0

## 2019-06-18 NOTE — PROGRESS NOTES
OFFICE PROGRESS NOTE      SUBJECTIVE:        Patient ID:   Juan Lewis is a 59 y.o. female whopresents for   Chief Complaint   Patient presents with    Diabetes           HPI:   Patient is here to follow up on diabetes. Fasting blood sugars:not checking  Midday blood sugars: not checking. Patient checks blood glucose 0 times per day. Patient is not following diabetic diet. Patient is a nonsmoker. Last ophthalmology visit: 2017. Patient is taking a daily statin. Patient doing well on current regimen for hypertension. Patient doing well on current regimen for hypothyroidism. Prior to Admission medications    Medication Sig Start Date End Date Taking? Authorizing Provider   METRONIDAZOLE, TOPICAL, 0.75 % LOTN Apply to face BID 6/18/19  Yes Marianne Mckinney MD   ketoconazole (NIZORAL) 2 % cream Apply topically daily.  4/10/19  Yes Marianne Mckinney MD   atorvastatin (LIPITOR) 20 MG tablet Take 1 tablet by mouth daily 3/7/19  Yes Marianne Mckinney MD   metFORMIN (GLUCOPHAGE) 500 MG tablet Take 2 tablets by mouth 2 times daily (with meals) 3/7/19  Yes Marianne Mckinney MD   SYNTHROID 200 MCG tablet Take 1 tablet by mouth daily 400 mcg Monday-Thursday, 200 mcg other days 3/7/19  Yes Marianne Mckinney MD   losartan-hydrochlorothiazide [facility]) 100-25 MG per tablet Take 1 tablet by mouth daily 3/7/19  Yes Marianne Mckinney MD   indomethacin (INDOCIN SR) 75 MG extended release capsule Take 1 capsule by mouth 2 times daily (with meals) 3/7/19  Yes Marianne Mckinney MD   allopurinol (ZYLOPRIM) 100 MG tablet Take 1 tablet by mouth daily 3/7/19  Yes Marianne Mckinney MD   blood glucose test strips (FREESTYLE LITE) strip Check blood sugar qam 3/7/19  Yes Marianne Mckinney MD   FREESTYLE LANCETS MISC Check blood sugar qam 3/7/19  Yes Marianne Mckinney MD   triamcinolone (KENALOG) 0.1 % cream Apply bid to affected areas prn 3/7/19  Yes Leeroy Mays MD   Blood Glucose Monitoring Suppl (FREESTYLE LITE) LAYNE Check blood sugar qam 3/7/19  Yes Leeroy Mays MD   vitamin D (ERGOCALCIFEROL) 36903 units CAPS capsule Take 1 capsule by mouth once a week 10/3/18  Yes Leeroy Mays MD   Handicap Placard MISC by Does not apply route Unable to ambulate more than 60 feet without difficulty  Expires 10/31/2022 10/25/17  Yes Leeroy Mays MD     Social History     Socioeconomic History    Marital status:      Spouse name: None    Number of children: None    Years of education: None    Highest education level: None   Occupational History    None   Social Needs    Financial resource strain: None    Food insecurity:     Worry: None     Inability: None    Transportation needs:     Medical: None     Non-medical: None   Tobacco Use    Smoking status: Never Smoker    Smokeless tobacco: Never Used   Substance and Sexual Activity    Alcohol use: Yes     Comment: social    Drug use: No    Sexual activity: None   Lifestyle    Physical activity:     Days per week: None     Minutes per session: None    Stress: None   Relationships    Social connections:     Talks on phone: None     Gets together: None     Attends [employer] orthodox service: None     Active member of club or organization: None     Attends meetings of clubs or organizations: None     Relationship status: None    Intimate partner violence:     Fear of current or ex partner: None     Emotionally abused: None     Physically abused: None     Forced sexual activity: None   Other Topics Concern    None   Social History Narrative    None       I have reviewed Gilma's allergies, medications, problem list, medical, social and family history and have updated as needed in the electronic medical record    Current Outpatient Medications   Medication Sig Dispense Refill    METRONIDAZOLE, TOPICAL, 0.75 % LOTN Apply to face BID 3 Bottle 3

## 2019-06-18 NOTE — PATIENT INSTRUCTIONS

## 2019-09-09 DIAGNOSIS — E03.9 HYPOTHYROIDISM (ACQUIRED): ICD-10-CM

## 2019-09-09 DIAGNOSIS — I10 HYPERTENSION, UNSPECIFIED TYPE: ICD-10-CM

## 2019-09-10 RX ORDER — LEVOTHYROXINE SODIUM 200 MCG
200 TABLET ORAL DAILY
Qty: 180 TABLET | Refills: 1 | Status: SHIPPED | OUTPATIENT
Start: 2019-09-10 | End: 2019-09-16 | Stop reason: SDUPTHER

## 2019-09-10 RX ORDER — LOSARTAN POTASSIUM AND HYDROCHLOROTHIAZIDE 25; 100 MG/1; MG/1
1 TABLET ORAL DAILY
Qty: 90 TABLET | Refills: 1 | Status: SHIPPED
Start: 2019-09-10 | End: 2020-04-10 | Stop reason: SDUPTHER

## 2019-09-11 DIAGNOSIS — E03.9 HYPOTHYROIDISM (ACQUIRED): ICD-10-CM

## 2019-09-16 RX ORDER — LEVOTHYROXINE SODIUM 200 MCG
200 TABLET ORAL DAILY
Qty: 180 TABLET | Refills: 1 | Status: SHIPPED
Start: 2019-09-16 | End: 2020-03-09

## 2019-09-19 ENCOUNTER — OFFICE VISIT (OUTPATIENT)
Dept: FAMILY MEDICINE CLINIC | Age: 64
End: 2019-09-19
Payer: COMMERCIAL

## 2019-09-19 VITALS
OXYGEN SATURATION: 96 % | RESPIRATION RATE: 20 BRPM | BODY MASS INDEX: 41.6 KG/M2 | HEIGHT: 65 IN | DIASTOLIC BLOOD PRESSURE: 88 MMHG | HEART RATE: 76 BPM | SYSTOLIC BLOOD PRESSURE: 136 MMHG

## 2019-09-19 DIAGNOSIS — E11.65 TYPE 2 DIABETES MELLITUS WITH HYPERGLYCEMIA, WITHOUT LONG-TERM CURRENT USE OF INSULIN (HCC): Primary | ICD-10-CM

## 2019-09-19 DIAGNOSIS — E03.9 HYPOTHYROIDISM (ACQUIRED): ICD-10-CM

## 2019-09-19 DIAGNOSIS — I10 ESSENTIAL HYPERTENSION: ICD-10-CM

## 2019-09-19 PROBLEM — E11.9 TYPE 2 DIABETES MELLITUS WITHOUT COMPLICATION, WITHOUT LONG-TERM CURRENT USE OF INSULIN (HCC): Status: RESOLVED | Noted: 2017-02-02 | Resolved: 2019-09-19

## 2019-09-19 LAB — HBA1C MFR BLD: 7.3 %

## 2019-09-19 PROCEDURE — 3045F PR MOST RECENT HEMOGLOBIN A1C LEVEL 7.0-9.0%: CPT | Performed by: FAMILY MEDICINE

## 2019-09-19 PROCEDURE — 3017F COLORECTAL CA SCREEN DOC REV: CPT | Performed by: FAMILY MEDICINE

## 2019-09-19 PROCEDURE — G8417 CALC BMI ABV UP PARAM F/U: HCPCS | Performed by: FAMILY MEDICINE

## 2019-09-19 PROCEDURE — G8427 DOCREV CUR MEDS BY ELIG CLIN: HCPCS | Performed by: FAMILY MEDICINE

## 2019-09-19 PROCEDURE — 1036F TOBACCO NON-USER: CPT | Performed by: FAMILY MEDICINE

## 2019-09-19 PROCEDURE — 99214 OFFICE O/P EST MOD 30 MIN: CPT | Performed by: FAMILY MEDICINE

## 2019-09-19 PROCEDURE — 2022F DILAT RTA XM EVC RTNOPTHY: CPT | Performed by: FAMILY MEDICINE

## 2019-09-19 PROCEDURE — 83036 HEMOGLOBIN GLYCOSYLATED A1C: CPT | Performed by: FAMILY MEDICINE

## 2019-09-19 PROCEDURE — 3014F SCREEN MAMMO DOC REV: CPT | Performed by: FAMILY MEDICINE

## 2019-09-19 ASSESSMENT — PATIENT HEALTH QUESTIONNAIRE - PHQ9
SUM OF ALL RESPONSES TO PHQ QUESTIONS 1-9: 0
1. LITTLE INTEREST OR PLEASURE IN DOING THINGS: 0
2. FEELING DOWN, DEPRESSED OR HOPELESS: 0
SUM OF ALL RESPONSES TO PHQ9 QUESTIONS 1 & 2: 0
SUM OF ALL RESPONSES TO PHQ QUESTIONS 1-9: 0

## 2019-09-19 ASSESSMENT — ENCOUNTER SYMPTOMS
DIARRHEA: 0
VOMITING: 0
SHORTNESS OF BREATH: 0
NAUSEA: 0

## 2019-09-19 NOTE — PROGRESS NOTES
OFFICE PROGRESS NOTE      SUBJECTIVE:        Patient ID:   Waqar Mason is a 59 y.o. female whopresents for   Chief Complaint   Patient presents with    Diabetes     HPI:   Patient is here to follow up on diabetes. Fasting blood sugars:130-140's Midday blood sugars: 130's. Patient checks blood glucose 1 times per day. Patient is not following diabetic diet. Patient is a nonsmoker. Last ophthalmology visit: 10/17--has upcoming appointment. Patient is taking a daily statin. Patient has noticed night sweats the past few months. Patient has associated dry throat at night. Patient doing well on current regimen for hypothyroidism. Patient doing well on current regimen for hypertension. Prior to Admission medications    Medication Sig Start Date End Date Taking? Authorizing Provider   metFORMIN (GLUCOPHAGE) 1000 MG tablet Take 1 tablet by mouth 2 times daily (with meals) 9/19/19  Yes Douglas Raman MD   SYNTHROID 200 MCG tablet Take 1 tablet by mouth daily 400 mcg Monday-Thursday, 200 mcg other days 9/16/19  Yes Douglas Raman MD   losartan-hydrochlorothiazide [facility]) 100-25 MG per tablet Take 1 tablet by mouth daily 9/10/19  Yes Douglas Raman MD   METRONIDAZOLE, TOPICAL, 0.75 % LOTN Apply to face BID 6/18/19  Yes Douglas Raman MD   ketoconazole (NIZORAL) 2 % cream Apply topically daily.  4/10/19  Yes Douglas Raman MD   atorvastatin (LIPITOR) 20 MG tablet Take 1 tablet by mouth daily 3/7/19  Yes Douglas Raman MD   indomethacin (INDOCIN SR) 75 MG extended release capsule Take 1 capsule by mouth 2 times daily (with meals) 3/7/19  Yes Douglas Raman MD   allopurinol (ZYLOPRIM) 100 MG tablet Take 1 tablet by mouth daily 3/7/19  Yes Douglas Raman MD   blood glucose test strips (FREESTYLE LITE) strip Check blood sugar qam 3/7/19  Yes Douglas Raman MD   FREESTYLE LANCETS [facility] Check blood Encounters:   04/06/19 250 lb (113.4 kg)   10/05/18 250 lb (113.4 kg)   05/29/18 250 lb (113.4 kg)     Vitals:    09/19/19 0808   BP: 136/88   Pulse: 76   Resp: 20   SpO2: 96%       Physical Exam   Constitutional: She is oriented to person, place, and time. She appears well-developed and well-nourished. No distress. Neck: Neck supple. Carotid bruit is not present. Cardiovascular: Normal rate, regular rhythm and normal heart sounds. Exam reveals no gallop. No murmur heard. Pulmonary/Chest: Effort normal and breath sounds normal. She has no wheezes. She has no rales. Abdominal: Soft. Bowel sounds are normal. She exhibits no distension. There is no tenderness. Musculoskeletal: She exhibits no edema. Neurological: She is alert and oriented to person, place, and time. Skin: Skin is warm and dry. Psychiatric: She has a normal mood and affect. Vitals reviewed. Results for orders placed or performed in visit on 09/19/19   POCT glycosylated hemoglobin (Hb A1C)   Result Value Ref Range    Hemoglobin A1C 7.3 %         Katey Delacruz was seen today for diabetes. Diagnoses and all orders for this visit:    Type 2 diabetes mellitus with hyperglycemia, without long-term current use of insulin (HCC)  -     POCT glycosylated hemoglobin (Hb A1C)  -     metFORMIN (GLUCOPHAGE) 1000 MG tablet; Take 1 tablet by mouth 2 times daily (with meals)  -     Microalbumin / Creatinine Urine Ratio; Future        -     Improve diet    Essential hypertension  -     CBC; Future  -     Comprehensive Metabolic Panel; Future  -     Lipid Panel; Future  -     TSH without Reflex; Future        -     Continue antihypertensive regimen    Hypothyroidism (acquired)  -     TSH without Reflex; Future        -     Continue Synthroid      BMI was elevated today, and weight loss plan recommended is : conventional weight loss. Phone/MyChart follow up if tests abnormal.    Return in about 3 months (around 12/19/2019) for diabetes.

## 2019-11-06 DIAGNOSIS — E11.65 TYPE 2 DIABETES MELLITUS WITH HYPERGLYCEMIA, WITHOUT LONG-TERM CURRENT USE OF INSULIN (HCC): ICD-10-CM

## 2019-11-06 DIAGNOSIS — E11.9 TYPE 2 DIABETES MELLITUS WITHOUT COMPLICATION, WITHOUT LONG-TERM CURRENT USE OF INSULIN (HCC): ICD-10-CM

## 2019-11-07 DIAGNOSIS — E11.65 TYPE 2 DIABETES MELLITUS WITH HYPERGLYCEMIA, WITHOUT LONG-TERM CURRENT USE OF INSULIN (HCC): ICD-10-CM

## 2019-11-07 RX ORDER — ATORVASTATIN CALCIUM 20 MG/1
TABLET, FILM COATED ORAL
Qty: 90 TABLET | Refills: 3 | Status: SHIPPED
Start: 2019-11-07 | End: 2020-07-28 | Stop reason: SDUPTHER

## 2019-11-11 ENCOUNTER — HOSPITAL ENCOUNTER (OUTPATIENT)
Age: 64
Discharge: HOME | End: 2019-11-13
Payer: COMMERCIAL

## 2019-11-11 DIAGNOSIS — I10 ESSENTIAL HYPERTENSION: ICD-10-CM

## 2019-11-11 DIAGNOSIS — E03.9 HYPOTHYROIDISM (ACQUIRED): ICD-10-CM

## 2019-11-11 DIAGNOSIS — E11.65 TYPE 2 DIABETES MELLITUS WITH HYPERGLYCEMIA, WITHOUT LONG-TERM CURRENT USE OF INSULIN (HCC): ICD-10-CM

## 2019-11-11 LAB
ALBUMIN SERPL-MCNC: 4.1 G/DL (ref 3.5–5.2)
ALP BLD-CCNC: 191 U/L (ref 35–104)
ALT SERPL-CCNC: 37 U/L (ref 0–32)
ANION GAP SERPL CALCULATED.3IONS-SCNC: 14 MMOL/L (ref 7–16)
AST SERPL-CCNC: 36 U/L (ref 0–31)
BILIRUB SERPL-MCNC: 0.4 MG/DL (ref 0–1.2)
BUN BLDV-MCNC: 21 MG/DL (ref 8–23)
CALCIUM SERPL-MCNC: 9.6 MG/DL (ref 8.6–10.2)
CHLORIDE BLD-SCNC: 100 MMOL/L (ref 98–107)
CHOLESTEROL, TOTAL: 146 MG/DL (ref 0–199)
CO2: 25 MMOL/L (ref 22–29)
CREAT SERPL-MCNC: 0.8 MG/DL (ref 0.5–1)
CREATININE URINE: 83 MG/DL (ref 29–226)
GFR AFRICAN AMERICAN: >60
GFR NON-AFRICAN AMERICAN: >60 ML/MIN/1.73
GLUCOSE BLD-MCNC: 152 MG/DL (ref 74–99)
HCT VFR BLD CALC: 39.2 % (ref 34–48)
HDLC SERPL-MCNC: 36 MG/DL
HEMOGLOBIN: 11.5 G/DL (ref 11.5–15.5)
LDL CHOLESTEROL CALCULATED: 76 MG/DL (ref 0–99)
MCH RBC QN AUTO: 23.9 PG (ref 26–35)
MCHC RBC AUTO-ENTMCNC: 29.3 % (ref 32–34.5)
MCV RBC AUTO: 81.5 FL (ref 80–99.9)
MICROALBUMIN UR-MCNC: 23.2 MG/L
MICROALBUMIN/CREAT UR-RTO: 28 (ref 0–30)
PDW BLD-RTO: 16.3 FL (ref 11.5–15)
PLATELET # BLD: 168 E9/L (ref 130–450)
PMV BLD AUTO: ABNORMAL FL (ref 7–12)
POTASSIUM SERPL-SCNC: 4.5 MMOL/L (ref 3.5–5)
RBC # BLD: 4.81 E12/L (ref 3.5–5.5)
SODIUM BLD-SCNC: 139 MMOL/L (ref 132–146)
TOTAL PROTEIN: 8.3 G/DL (ref 6.4–8.3)
TRIGL SERPL-MCNC: 172 MG/DL (ref 0–149)
TSH SERPL DL<=0.05 MIU/L-ACNC: 1.45 UIU/ML (ref 0.27–4.2)
VLDLC SERPL CALC-MCNC: 34 MG/DL
WBC # BLD: 3.2 E9/L (ref 4.5–11.5)

## 2019-11-11 PROCEDURE — 80053 COMPREHEN METABOLIC PANEL: CPT

## 2019-11-11 PROCEDURE — 82570 ASSAY OF URINE CREATININE: CPT

## 2019-11-11 PROCEDURE — 82044 UR ALBUMIN SEMIQUANTITATIVE: CPT

## 2019-11-11 PROCEDURE — 85027 COMPLETE CBC AUTOMATED: CPT

## 2019-11-11 PROCEDURE — 80061 LIPID PANEL: CPT

## 2019-11-11 PROCEDURE — 36415 COLL VENOUS BLD VENIPUNCTURE: CPT

## 2019-11-11 PROCEDURE — 84443 ASSAY THYROID STIM HORMONE: CPT

## 2019-12-05 RX ORDER — ALLOPURINOL 100 MG/1
100 TABLET ORAL DAILY
Qty: 90 TABLET | Refills: 1 | Status: SHIPPED
Start: 2019-12-05 | End: 2020-07-28 | Stop reason: SDUPTHER

## 2020-01-28 ENCOUNTER — OFFICE VISIT (OUTPATIENT)
Dept: FAMILY MEDICINE CLINIC | Age: 65
End: 2020-01-28
Payer: COMMERCIAL

## 2020-01-28 VITALS
DIASTOLIC BLOOD PRESSURE: 84 MMHG | HEIGHT: 65 IN | SYSTOLIC BLOOD PRESSURE: 138 MMHG | HEART RATE: 76 BPM | BODY MASS INDEX: 41.6 KG/M2 | RESPIRATION RATE: 20 BRPM | OXYGEN SATURATION: 96 %

## 2020-01-28 LAB — HBA1C MFR BLD: 7 %

## 2020-01-28 PROCEDURE — G9899 SCRN MAM PERF RSLTS DOC: HCPCS | Performed by: FAMILY MEDICINE

## 2020-01-28 PROCEDURE — 83036 HEMOGLOBIN GLYCOSYLATED A1C: CPT | Performed by: FAMILY MEDICINE

## 2020-01-28 PROCEDURE — G8484 FLU IMMUNIZE NO ADMIN: HCPCS | Performed by: FAMILY MEDICINE

## 2020-01-28 PROCEDURE — 3017F COLORECTAL CA SCREEN DOC REV: CPT | Performed by: FAMILY MEDICINE

## 2020-01-28 PROCEDURE — G8427 DOCREV CUR MEDS BY ELIG CLIN: HCPCS | Performed by: FAMILY MEDICINE

## 2020-01-28 PROCEDURE — G8417 CALC BMI ABV UP PARAM F/U: HCPCS | Performed by: FAMILY MEDICINE

## 2020-01-28 PROCEDURE — 1036F TOBACCO NON-USER: CPT | Performed by: FAMILY MEDICINE

## 2020-01-28 PROCEDURE — 2022F DILAT RTA XM EVC RTNOPTHY: CPT | Performed by: FAMILY MEDICINE

## 2020-01-28 PROCEDURE — 99213 OFFICE O/P EST LOW 20 MIN: CPT | Performed by: FAMILY MEDICINE

## 2020-01-28 ASSESSMENT — ENCOUNTER SYMPTOMS
VOMITING: 0
NAUSEA: 0
SHORTNESS OF BREATH: 0
DIARRHEA: 0

## 2020-01-28 ASSESSMENT — PATIENT HEALTH QUESTIONNAIRE - PHQ9
1. LITTLE INTEREST OR PLEASURE IN DOING THINGS: 0
SUM OF ALL RESPONSES TO PHQ QUESTIONS 1-9: 0
SUM OF ALL RESPONSES TO PHQ9 QUESTIONS 1 & 2: 0
2. FEELING DOWN, DEPRESSED OR HOPELESS: 0
SUM OF ALL RESPONSES TO PHQ QUESTIONS 1-9: 0

## 2020-01-28 NOTE — PATIENT INSTRUCTIONS

## 2020-01-28 NOTE — PROGRESS NOTES
to affected areas prn 3/7/19  Yes Peterson Petersen MD   Blood Glucose Monitoring Suppl (FREESTYLE LITE) LAYNE Check blood sugar qam 3/7/19  Yes Peterson Petersen MD   vitamin D (ERGOCALCIFEROL) 35207 units CAPS capsule Take 1 capsule by mouth once a week 10/3/18  Yes Peterson Petersen MD   Handicap Placard MISC by Does not apply route Unable to ambulate more than 60 feet without difficulty  Expires 10/31/2022 10/25/17  Yes Peterson Petersen MD     Social History     Socioeconomic History    Marital status:      Spouse name: None    Number of children: None    Years of education: None    Highest education level: None   Occupational History    None   Social Needs    Financial resource strain: None    Food insecurity:     Worry: None     Inability: None    Transportation needs:     Medical: None     Non-medical: None   Tobacco Use    Smoking status: Never Smoker    Smokeless tobacco: Never Used   Substance and Sexual Activity    Alcohol use: Yes     Comment: social    Drug use: No    Sexual activity: None   Lifestyle    Physical activity:     Days per week: None     Minutes per session: None    Stress: None   Relationships    Social connections:     Talks on phone: None     Gets together: None     Attends Confucianism service: None     Active member of club or organization: None     Attends meetings of clubs or organizations: None     Relationship status: None    Intimate partner violence:     Fear of current or ex partner: None     Emotionally abused: None     Physically abused: None     Forced sexual activity: None   Other Topics Concern    None   Social History Narrative    None       I have reviewed Gilma's allergies, medications, problem list, medical, social and family history and have updated as needed in the electronic medical record    Current Outpatient Medications   Medication Sig Dispense Refill    metFORMIN (GLUCOPHAGE) 1000 MG tablet Take 1 tablet by mouth 2 times daily (with meals) 180 tablet 1    allopurinol (ZYLOPRIM) 100 MG tablet Take 1 tablet by mouth daily 90 tablet 1    atorvastatin (LIPITOR) 20 MG tablet TAKE 1 TABLET DAILY 90 tablet 3    SYNTHROID 200 MCG tablet Take 1 tablet by mouth daily 400 mcg Monday-Thursday, 200 mcg other days 180 tablet 1    losartan-hydrochlorothiazide (HYZAAR) 100-25 MG per tablet Take 1 tablet by mouth daily 90 tablet 1    METRONIDAZOLE, TOPICAL, 0.75 % LOTN Apply to face BID 3 Bottle 3    ketoconazole (NIZORAL) 2 % cream Apply topically daily. 60 g 5    indomethacin (INDOCIN SR) 75 MG extended release capsule Take 1 capsule by mouth 2 times daily (with meals) 180 capsule 3    blood glucose test strips (FREESTYLE LITE) strip Check blood sugar qam 150 each 3    FREESTYLE LANCETS MISC Check blood sugar qam 150 each 3    triamcinolone (KENALOG) 0.1 % cream Apply bid to affected areas prn 1200 g 3    Blood Glucose Monitoring Suppl (FREESTYLE LITE) LAYNE Check blood sugar qam 1 Device 0    vitamin D (ERGOCALCIFEROL) 55051 units CAPS capsule Take 1 capsule by mouth once a week 12 capsule 1    Handicap Placard MISC by Does not apply route Unable to ambulate more than 60 feet without difficulty  Expires 10/31/2022 1 each 0     No current facility-administered medications for this visit. Review Of Systems:    Review of Systems   Eyes: Negative for visual disturbance. Respiratory: Negative for shortness of breath. Cardiovascular: Negative for chest pain, palpitations and leg swelling. Gastrointestinal: Negative for diarrhea, nausea and vomiting. Genitourinary: Negative for difficulty urinating, dysuria and frequency. Skin: Negative for rash. Psychiatric/Behavioral: Negative for dysphoric mood.            OBJECTIVE:     VS:  Wt Readings from Last 3 Encounters:   04/06/19 250 lb (113.4 kg)   10/05/18 250 lb (113.4 kg)   05/29/18 250 lb (113.4 kg)     Vitals:    01/28/20 0817   BP: 138/84

## 2020-03-09 RX ORDER — LEVOTHYROXINE SODIUM 200 MCG
TABLET ORAL
Qty: 180 TABLET | Refills: 3 | Status: SHIPPED
Start: 2020-03-09 | End: 2020-07-28 | Stop reason: SDUPTHER

## 2020-04-13 RX ORDER — LOSARTAN POTASSIUM AND HYDROCHLOROTHIAZIDE 25; 100 MG/1; MG/1
1 TABLET ORAL DAILY
Qty: 90 TABLET | Refills: 1 | Status: SHIPPED
Start: 2020-04-13 | End: 2020-07-28 | Stop reason: SDUPTHER

## 2020-04-30 ENCOUNTER — OFFICE VISIT (OUTPATIENT)
Dept: FAMILY MEDICINE CLINIC | Age: 65
End: 2020-04-30
Payer: COMMERCIAL

## 2020-04-30 VITALS
SYSTOLIC BLOOD PRESSURE: 138 MMHG | WEIGHT: 248 LBS | RESPIRATION RATE: 20 BRPM | DIASTOLIC BLOOD PRESSURE: 82 MMHG | HEART RATE: 80 BPM | OXYGEN SATURATION: 97 % | BODY MASS INDEX: 41.32 KG/M2 | HEIGHT: 65 IN

## 2020-04-30 LAB — HBA1C MFR BLD: 7.2 %

## 2020-04-30 PROCEDURE — G8427 DOCREV CUR MEDS BY ELIG CLIN: HCPCS | Performed by: FAMILY MEDICINE

## 2020-04-30 PROCEDURE — 4040F PNEUMOC VAC/ADMIN/RCVD: CPT | Performed by: FAMILY MEDICINE

## 2020-04-30 PROCEDURE — G8400 PT W/DXA NO RESULTS DOC: HCPCS | Performed by: FAMILY MEDICINE

## 2020-04-30 PROCEDURE — 1090F PRES/ABSN URINE INCON ASSESS: CPT | Performed by: FAMILY MEDICINE

## 2020-04-30 PROCEDURE — 3017F COLORECTAL CA SCREEN DOC REV: CPT | Performed by: FAMILY MEDICINE

## 2020-04-30 PROCEDURE — 83036 HEMOGLOBIN GLYCOSYLATED A1C: CPT | Performed by: FAMILY MEDICINE

## 2020-04-30 PROCEDURE — 2022F DILAT RTA XM EVC RTNOPTHY: CPT | Performed by: FAMILY MEDICINE

## 2020-04-30 PROCEDURE — 90471 IMMUNIZATION ADMIN: CPT | Performed by: FAMILY MEDICINE

## 2020-04-30 PROCEDURE — 3051F HG A1C>EQUAL 7.0%<8.0%: CPT | Performed by: FAMILY MEDICINE

## 2020-04-30 PROCEDURE — 90670 PCV13 VACCINE IM: CPT | Performed by: FAMILY MEDICINE

## 2020-04-30 PROCEDURE — 1036F TOBACCO NON-USER: CPT | Performed by: FAMILY MEDICINE

## 2020-04-30 PROCEDURE — G8417 CALC BMI ABV UP PARAM F/U: HCPCS | Performed by: FAMILY MEDICINE

## 2020-04-30 PROCEDURE — 99214 OFFICE O/P EST MOD 30 MIN: CPT | Performed by: FAMILY MEDICINE

## 2020-04-30 PROCEDURE — 1123F ACP DISCUSS/DSCN MKR DOCD: CPT | Performed by: FAMILY MEDICINE

## 2020-04-30 PROCEDURE — G9899 SCRN MAM PERF RSLTS DOC: HCPCS | Performed by: FAMILY MEDICINE

## 2020-04-30 RX ORDER — INDOMETHACIN 75 MG/1
75 CAPSULE, EXTENDED RELEASE ORAL 2 TIMES DAILY WITH MEALS
Qty: 180 CAPSULE | Refills: 3 | Status: SHIPPED
Start: 2020-04-30 | End: 2020-07-28 | Stop reason: SDUPTHER

## 2020-04-30 ASSESSMENT — PATIENT HEALTH QUESTIONNAIRE - PHQ9
SUM OF ALL RESPONSES TO PHQ QUESTIONS 1-9: 0
SUM OF ALL RESPONSES TO PHQ QUESTIONS 1-9: 0
2. FEELING DOWN, DEPRESSED OR HOPELESS: 0
1. LITTLE INTEREST OR PLEASURE IN DOING THINGS: 0
SUM OF ALL RESPONSES TO PHQ9 QUESTIONS 1 & 2: 0

## 2020-04-30 ASSESSMENT — ENCOUNTER SYMPTOMS
DIARRHEA: 0
NAUSEA: 0
SHORTNESS OF BREATH: 0
VOMITING: 0

## 2020-04-30 NOTE — PATIENT INSTRUCTIONS
rapid-acting insulin to take before you eat. If you use an insulin pump, you get a constant rate of insulin during the day. So the pump must be programmed at meals to give you extra insulin to cover the rise in blood sugar after meals. When you know how much carbohydrate you will eat, you can take the right amount of insulin. Or, if you always use the same amount of insulin, you need to make sure that you eat the same amount of carbohydrate at meals. If you need more help to understand carbohydrate counting and food labels, ask your doctor, dietitian, or diabetes educator. How do you get started with meal planning? Here are some tips to get started:  · Plan your meals a week at a time. Don't forget to include snacks too. · Use cookbooks or online recipes to plan several main meals. Plan some quick meals for busy nights. You also can double some recipes that freeze well. Then you can save half for other busy nights when you don't have time to cook. · Make sure you have the ingredients you need for your recipes. If you're running low on basic items, put these items on your shopping list too. · List foods that you use to make breakfasts, lunches, and snacks. List plenty of fruits and vegetables. · Post this list on the refrigerator. Add to it as you think of more things you need. · Take the list to the store to do your weekly shopping. Follow-up care is a key part of your treatment and safety. Be sure to make and go to all appointments, and call your doctor if you are having problems. It's also a good idea to know your test results and keep a list of the medicines you take. Where can you learn more? Go to [email or web address] org and sign in to your [employer] account. Enter X649 in the Elevate box to learn more about \"Learning About Meal Planning for Diabetes. \"     If you do not have an account, please click on the \"Sign Up Now\" link.   Current as of: December 19, 2019Content Version: 12.4  © 2489-5679 Healthwise, Incorporated. Care instructions adapted under license by [facility] ([facility]). If you have questions about a medical condition or this instruction, always ask your healthcare professional. [address] any warranty or liability for your use of this information.

## 2020-04-30 NOTE — PROGRESS NOTES
medications, problem list, medical, social and family history and have updated as needed in the electronic medical record    Current Outpatient Medications   Medication Sig Dispense Refill    metFORMIN (GLUCOPHAGE) 1000 MG tablet Take 1 tablet by mouth 2 times daily (with meals) 180 tablet 1    indomethacin (INDOCIN SR) 75 MG extended release capsule Take 1 capsule by mouth 2 times daily (with meals) 180 capsule 3    losartan-hydrochlorothiazide (HYZAAR) 100-25 MG per tablet Take 1 tablet by mouth daily 90 tablet 1    SYNTHROID 200 MCG tablet TAKE DAILY AS DIRECTED . TAKE 2 TABLETS ( 400 MCG ) MONDAY THROUGH THURSDAY AND TAKE 1 TABLET ( 200 MCG ) OTHER DAYS 180 tablet 3    allopurinol (ZYLOPRIM) 100 MG tablet Take 1 tablet by mouth daily 90 tablet 1    atorvastatin (LIPITOR) 20 MG tablet TAKE 1 TABLET DAILY 90 tablet 3    METRONIDAZOLE, TOPICAL, 0.75 % LOTN Apply to face BID 3 Bottle 3    ketoconazole (NIZORAL) 2 % cream Apply topically daily. 60 g 5    blood glucose test strips (FREESTYLE LITE) strip Check blood sugar qam 150 each 3    FREESTYLE LANCETS MISC Check blood sugar qam 150 each 3    triamcinolone (KENALOG) 0.1 % cream Apply bid to affected areas prn 1200 g 3    Blood Glucose Monitoring Suppl (FREESTYLE LITE) LAYNE Check blood sugar qam 1 Device 0    vitamin D (ERGOCALCIFEROL) 99203 units CAPS capsule Take 1 capsule by mouth once a week 12 capsule 1    Handicap Placard MISC by Does not apply route Unable to ambulate more than 60 feet without difficulty  Expires 10/31/2022 1 each 0     No current facility-administered medications for this visit. Review Of Systems:    Review of Systems   Eyes: Positive for visual disturbance (blurry vision comes and goes). Respiratory: Negative for shortness of breath. Cardiovascular: Negative for chest pain, palpitations and leg swelling. Gastrointestinal: Negative for diarrhea, nausea and vomiting.    Genitourinary: Negative for difficulty urinating, dysuria and frequency. Skin: Negative for rash. Psychiatric/Behavioral: Negative for dysphoric mood. OBJECTIVE:     VS:  Wt Readings from Last 3 Encounters:   04/30/20 248 lb (112.5 kg)   04/06/19 250 lb (113.4 kg)   10/05/18 250 lb (113.4 kg)     Vitals:    04/30/20 0817   BP: 138/82   Pulse: 80   Resp: 20   SpO2: 97%       Physical Exam  Vitals signs reviewed. Constitutional:       General: She is not in acute distress. Appearance: She is well-developed. Neck:      Musculoskeletal: Neck supple. Vascular: No carotid bruit. Cardiovascular:      Rate and Rhythm: Normal rate and regular rhythm. Heart sounds: Normal heart sounds. No murmur. No gallop. Pulmonary:      Effort: Pulmonary effort is normal.      Breath sounds: Normal breath sounds. No wheezing or rales. Abdominal:      General: Bowel sounds are normal. There is no distension. Palpations: Abdomen is soft. Tenderness: There is no abdominal tenderness. Skin:     General: Skin is warm and dry. Neurological:      Mental Status: She is alert and oriented to person, place, and time. Results for orders placed or performed in visit on 04/30/20   POCT glycosylated hemoglobin (Hb A1C)   Result Value Ref Range    Hemoglobin A1C 7.2 %         Brandon Rowe was seen today for diabetes. Diagnoses and all orders for this visit:    Type 2 diabetes mellitus with hyperglycemia, without long-term current use of insulin (HCC)  -     POCT glycosylated hemoglobin (Hb A1C)  -     metFORMIN (GLUCOPHAGE) 1000 MG tablet; Take 1 tablet by mouth 2 times daily (with meals)         -     Improve diet    Hypothyroidism (acquired)  -     TSH without Reflex;  Future        -     Continue Synthroid    Postprandial abdominal bloating        -     Patient advised to contact Dr. Ashia Steen for oncology recommendations; may order CA-125 earlier    Essential hypertension         -     Continue antihypertensive regimen    Need for

## 2020-06-02 ENCOUNTER — PATIENT MESSAGE (OUTPATIENT)
Dept: FAMILY MEDICINE CLINIC | Age: 65
End: 2020-06-02

## 2020-06-03 NOTE — TELEPHONE ENCOUNTER
From: Loren Rodríguez  To: Ani Gardner MD  Sent: 6/2/2020 6:47 PM EDT  Subject: Prescription Question    Hi, I need refills for Ketoconazole and Metronidazole lotions. Can you please send it to [address] [place] [address] on [facility].  Thank you

## 2020-06-08 RX ORDER — METRONIDAZOLE 7.5 MG/G
LOTION TOPICAL
Qty: 3 BOTTLE | Refills: 3 | Status: SHIPPED
Start: 2020-06-08 | End: 2020-07-28 | Stop reason: SDUPTHER

## 2020-06-08 RX ORDER — KETOCONAZOLE 20 MG/G
CREAM TOPICAL
Qty: 60 G | Refills: 5 | Status: SHIPPED
Start: 2020-06-08 | End: 2020-07-28 | Stop reason: SDUPTHER

## 2020-06-15 ENCOUNTER — HOSPITAL ENCOUNTER (OUTPATIENT)
Age: 65
Discharge: HOME | End: 2020-06-17
Payer: COMMERCIAL

## 2020-06-15 LAB — TSH SERPL DL<=0.05 MIU/L-ACNC: 1.05 UIU/ML (ref 0.27–4.2)

## 2020-06-15 PROCEDURE — 84443 ASSAY THYROID STIM HORMONE: CPT

## 2020-06-15 PROCEDURE — 36415 COLL VENOUS BLD VENIPUNCTURE: CPT

## 2020-07-28 ENCOUNTER — OFFICE VISIT (OUTPATIENT)
Dept: FAMILY MEDICINE CLINIC | Age: 65
End: 2020-07-28
Payer: COMMERCIAL

## 2020-07-28 VITALS
DIASTOLIC BLOOD PRESSURE: 82 MMHG | WEIGHT: 250 LBS | RESPIRATION RATE: 20 BRPM | OXYGEN SATURATION: 98 % | HEIGHT: 65 IN | HEART RATE: 81 BPM | BODY MASS INDEX: 41.65 KG/M2 | SYSTOLIC BLOOD PRESSURE: 138 MMHG

## 2020-07-28 LAB — HBA1C MFR BLD: 7.3 %

## 2020-07-28 PROCEDURE — G8417 CALC BMI ABV UP PARAM F/U: HCPCS | Performed by: FAMILY MEDICINE

## 2020-07-28 PROCEDURE — G8400 PT W/DXA NO RESULTS DOC: HCPCS | Performed by: FAMILY MEDICINE

## 2020-07-28 PROCEDURE — 1036F TOBACCO NON-USER: CPT | Performed by: FAMILY MEDICINE

## 2020-07-28 PROCEDURE — 2022F DILAT RTA XM EVC RTNOPTHY: CPT | Performed by: FAMILY MEDICINE

## 2020-07-28 PROCEDURE — 3051F HG A1C>EQUAL 7.0%<8.0%: CPT | Performed by: FAMILY MEDICINE

## 2020-07-28 PROCEDURE — 4040F PNEUMOC VAC/ADMIN/RCVD: CPT | Performed by: FAMILY MEDICINE

## 2020-07-28 PROCEDURE — G9899 SCRN MAM PERF RSLTS DOC: HCPCS | Performed by: FAMILY MEDICINE

## 2020-07-28 PROCEDURE — 99214 OFFICE O/P EST MOD 30 MIN: CPT | Performed by: FAMILY MEDICINE

## 2020-07-28 PROCEDURE — 3017F COLORECTAL CA SCREEN DOC REV: CPT | Performed by: FAMILY MEDICINE

## 2020-07-28 PROCEDURE — 83036 HEMOGLOBIN GLYCOSYLATED A1C: CPT | Performed by: FAMILY MEDICINE

## 2020-07-28 PROCEDURE — 1090F PRES/ABSN URINE INCON ASSESS: CPT | Performed by: FAMILY MEDICINE

## 2020-07-28 PROCEDURE — 1123F ACP DISCUSS/DSCN MKR DOCD: CPT | Performed by: FAMILY MEDICINE

## 2020-07-28 PROCEDURE — G8427 DOCREV CUR MEDS BY ELIG CLIN: HCPCS | Performed by: FAMILY MEDICINE

## 2020-07-28 RX ORDER — ALLOPURINOL 100 MG/1
100 TABLET ORAL DAILY
Qty: 90 TABLET | Refills: 1 | Status: SHIPPED
Start: 2020-07-28 | End: 2020-07-28 | Stop reason: SDUPTHER

## 2020-07-28 RX ORDER — METRONIDAZOLE 7.5 MG/G
LOTION TOPICAL
Qty: 3 BOTTLE | Refills: 3 | Status: SHIPPED | OUTPATIENT
Start: 2020-07-28 | End: 2021-08-12 | Stop reason: SDUPTHER

## 2020-07-28 RX ORDER — KETOCONAZOLE 20 MG/G
CREAM TOPICAL
Qty: 60 G | Refills: 5 | Status: SHIPPED | OUTPATIENT
Start: 2020-07-28 | End: 2021-08-12 | Stop reason: SDUPTHER

## 2020-07-28 RX ORDER — LOSARTAN POTASSIUM AND HYDROCHLOROTHIAZIDE 25; 100 MG/1; MG/1
1 TABLET ORAL DAILY
Qty: 90 TABLET | Refills: 1 | Status: SHIPPED | OUTPATIENT
Start: 2020-07-28 | End: 2021-02-11 | Stop reason: SDUPTHER

## 2020-07-28 RX ORDER — INDOMETHACIN 75 MG/1
75 CAPSULE, EXTENDED RELEASE ORAL 2 TIMES DAILY WITH MEALS
Qty: 180 CAPSULE | Refills: 3 | Status: SHIPPED | OUTPATIENT
Start: 2020-07-28 | End: 2021-05-11 | Stop reason: SDUPTHER

## 2020-07-28 RX ORDER — ATORVASTATIN CALCIUM 20 MG/1
TABLET, FILM COATED ORAL
Qty: 90 TABLET | Refills: 3 | Status: SHIPPED | OUTPATIENT
Start: 2020-07-28 | End: 2021-08-12 | Stop reason: SDUPTHER

## 2020-07-28 RX ORDER — FERROUS SULFATE 325(65) MG
TABLET ORAL
COMMUNITY
Start: 2020-07-07 | End: 2021-02-05 | Stop reason: ALTCHOICE

## 2020-07-28 RX ORDER — ALLOPURINOL 100 MG/1
100 TABLET ORAL DAILY
Qty: 90 TABLET | Refills: 1 | Status: SHIPPED | OUTPATIENT
Start: 2020-07-28 | End: 2021-08-12 | Stop reason: SDUPTHER

## 2020-07-28 RX ORDER — LEVOTHYROXINE SODIUM 0.2 MG/1
TABLET ORAL
Qty: 132 TABLET | Refills: 1 | Status: SHIPPED | OUTPATIENT
Start: 2020-07-28 | End: 2021-05-11 | Stop reason: SDUPTHER

## 2020-07-28 ASSESSMENT — ENCOUNTER SYMPTOMS
VOMITING: 0
DIARRHEA: 0
NAUSEA: 0
SHORTNESS OF BREATH: 0

## 2020-07-28 NOTE — PROGRESS NOTES
Medication Sig Dispense Refill    FEROSUL 325 (65 Fe) MG tablet       atorvastatin (LIPITOR) 20 MG tablet TAKE 1 TABLET DAILY 90 tablet 3    losartan-hydrochlorothiazide (HYZAAR) 100-25 MG per tablet Take 1 tablet by mouth daily 90 tablet 1    indomethacin (INDOCIN SR) 75 MG extended release capsule Take 1 capsule by mouth 2 times daily (with meals) 180 capsule 3    metFORMIN (GLUCOPHAGE) 1000 MG tablet Take 1 tablet by mouth 2 times daily (with meals) 180 tablet 1    METRONIDAZOLE, TOPICAL, 0.75 % LOTN Apply to face BID 3 Bottle 3    ketoconazole (NIZORAL) 2 % cream Apply topically daily. 60 g 5    allopurinol (ZYLOPRIM) 100 MG tablet Take 1 tablet by mouth daily 90 tablet 1    levothyroxine (SYNTHROID) 200 MCG tablet 400 mcg Monday-Thursday, 200 mcg other days 132 tablet 1    blood glucose test strips (FREESTYLE LITE) strip Check blood sugar qam 150 each 3    FREESTYLE LANCETS MISC Check blood sugar qam 150 each 3    triamcinolone (KENALOG) 0.1 % cream Apply bid to affected areas prn 1200 g 3    Blood Glucose Monitoring Suppl (FREESTYLE LITE) LAYNE Check blood sugar qam 1 Device 0    vitamin D (ERGOCALCIFEROL) 01905 units CAPS capsule Take 1 capsule by mouth once a week 12 capsule 1    Handicap Placard MISC by Does not apply route Unable to ambulate more than 60 feet without difficulty  Expires 10/31/2022 1 each 0     No current facility-administered medications for this visit. Review Of Systems:    Review of Systems   Eyes: Negative for visual disturbance. Respiratory: Negative for shortness of breath. Cardiovascular: Negative for chest pain, palpitations and leg swelling. Gastrointestinal: Negative for diarrhea, nausea and vomiting. Genitourinary: Negative for difficulty urinating, dysuria and frequency. Skin: Negative for rash. Psychiatric/Behavioral: Negative for dysphoric mood.            OBJECTIVE:     VS:  Wt Readings from Last 3 Encounters:   07/28/20 250 lb (113.4 kg) 10/28/2020) for diabetes. I have reviewed my findings and recommendations with Kajal Steel.     Luc Chaney M.D

## 2020-07-28 NOTE — PATIENT INSTRUCTIONS
Patient Education        Learning About Meal Planning for Diabetes  Why plan your meals? Meal planning can be a key part of managing diabetes. Planning meals and snacks with the right balance of carbohydrate, protein, and fat can help you keep your blood sugar at the target level you set with your doctor. You don't have to eat special foods. You can eat what your family eats, including sweets once in a while. But you do have to pay attention to how often you eat and how much you eat of certain foods. You may want to work with a dietitian or a certified diabetes educator. He or she can give you tips and meal ideas and can answer your questions about meal planning. This health professional can also help you reach a healthy weight if that is one of your goals. What plan is right for you? Your dietitian or diabetes educator may suggest that you start with the plate format or carbohydrate counting. The plate format  The plate format is a simple way to help you manage how you eat. You plan meals by learning how much space each food should take on a plate. Using the plate format helps you spread carbohydrate throughout the day. It can make it easier to keep your blood sugar level within your target range. It also helps you see if you're eating healthy portion sizes. To use the plate format, you put non-starchy vegetables on half your plate. Add meat or meat substitutes on one-quarter of the plate. Put a grain or starchy vegetable (such as brown rice or a potato) on the final quarter of the plate. You can add a small piece of fruit and some low-fat or fat-free milk or yogurt, depending on your carbohydrate goal for each meal.  Here are some tips for using the plate format:  · Make sure that you are not using an oversized plate. A 9-inch plate is best. Many restaurants use larger plates. · Get used to using the plate format at home. Then you can use it when you eat out.   · Write down your questions about using the plate format. Talk to your doctor, a dietitian, or a diabetes educator about your concerns. Carbohydrate counting  With carbohydrate counting, you plan meals based on the amount of carbohydrate in each food. Carbohydrate raises blood sugar higher and more quickly than any other nutrient. It is found in desserts, breads and cereals, and fruit. It's also found in starchy vegetables such as potatoes and corn, grains such as rice and pasta, and milk and yogurt. Spreading carbohydrate throughout the day helps keep your blood sugar levels within your target range. Your daily amount depends on several things, including your weight, how active you are, which diabetes medicines you take, and what your goals are for your blood sugar levels. A registered dietitian or diabetes educator can help you plan how much carbohydrate to include in each meal and snack. A guideline for your daily amount of carbohydrate is:  · 45 to 60 grams at each meal. That's about the same as 3 to 4 carbohydrate servings. · 15 to 20 grams at each snack. That's about the same as 1 carbohydrate serving. The Nutrition Facts label on packaged foods tells you how much carbohydrate is in a serving of the food. First, look at the serving size on the food label. Is that the amount you eat in a serving? All of the nutrition information on a food label is based on that serving size. So if you eat more or less than that, you'll need to adjust the other numbers. Total carbohydrate is the next thing you need to look for on the label. If you count carbohydrate servings, one serving of carbohydrate is 15 grams. For foods that don't come with labels, such as fresh fruits and vegetables, you'll need a guide that lists carbohydrate in these foods. Ask your doctor, dietitian, or diabetes educator about books or other nutrition guides you can use.   If you take insulin, you need to know how many grams of carbohydrate are in a meal. This lets you know how much 4354               IKWQFMF Version: 12.5  © 5908-1862 Healthwise, Incorporated. Care instructions adapted under license by [facility] ([facility]). If you have questions about a medical condition or this instruction, always ask your healthcare professional. [address] any warranty or liability for your use of this information.

## 2020-11-02 DIAGNOSIS — E03.9 HYPOTHYROIDISM (ACQUIRED): ICD-10-CM

## 2020-11-02 DIAGNOSIS — I10 HYPERTENSION, UNSPECIFIED TYPE: ICD-10-CM

## 2020-11-10 ENCOUNTER — OFFICE VISIT (OUTPATIENT)
Dept: FAMILY MEDICINE CLINIC | Age: 65
End: 2020-11-10
Payer: MEDICARE

## 2020-11-10 ENCOUNTER — PATIENT MESSAGE (OUTPATIENT)
Dept: FAMILY MEDICINE CLINIC | Age: 65
End: 2020-11-10

## 2020-11-10 VITALS
DIASTOLIC BLOOD PRESSURE: 84 MMHG | RESPIRATION RATE: 20 BRPM | HEART RATE: 77 BPM | HEIGHT: 65 IN | WEIGHT: 250 LBS | SYSTOLIC BLOOD PRESSURE: 138 MMHG | BODY MASS INDEX: 41.65 KG/M2 | OXYGEN SATURATION: 96 %

## 2020-11-10 LAB — HBA1C MFR BLD: 7.6 %

## 2020-11-10 PROCEDURE — G8484 FLU IMMUNIZE NO ADMIN: HCPCS | Performed by: FAMILY MEDICINE

## 2020-11-10 PROCEDURE — 4040F PNEUMOC VAC/ADMIN/RCVD: CPT | Performed by: FAMILY MEDICINE

## 2020-11-10 PROCEDURE — G8400 PT W/DXA NO RESULTS DOC: HCPCS | Performed by: FAMILY MEDICINE

## 2020-11-10 PROCEDURE — 90694 VACC AIIV4 NO PRSRV 0.5ML IM: CPT | Performed by: FAMILY MEDICINE

## 2020-11-10 PROCEDURE — 2022F DILAT RTA XM EVC RTNOPTHY: CPT | Performed by: FAMILY MEDICINE

## 2020-11-10 PROCEDURE — 3051F HG A1C>EQUAL 7.0%<8.0%: CPT | Performed by: FAMILY MEDICINE

## 2020-11-10 PROCEDURE — 83036 HEMOGLOBIN GLYCOSYLATED A1C: CPT | Performed by: FAMILY MEDICINE

## 2020-11-10 PROCEDURE — G8427 DOCREV CUR MEDS BY ELIG CLIN: HCPCS | Performed by: FAMILY MEDICINE

## 2020-11-10 PROCEDURE — 3017F COLORECTAL CA SCREEN DOC REV: CPT | Performed by: FAMILY MEDICINE

## 2020-11-10 PROCEDURE — 1123F ACP DISCUSS/DSCN MKR DOCD: CPT | Performed by: FAMILY MEDICINE

## 2020-11-10 PROCEDURE — G0008 ADMIN INFLUENZA VIRUS VAC: HCPCS | Performed by: FAMILY MEDICINE

## 2020-11-10 PROCEDURE — 1036F TOBACCO NON-USER: CPT | Performed by: FAMILY MEDICINE

## 2020-11-10 PROCEDURE — 99214 OFFICE O/P EST MOD 30 MIN: CPT | Performed by: FAMILY MEDICINE

## 2020-11-10 PROCEDURE — 1090F PRES/ABSN URINE INCON ASSESS: CPT | Performed by: FAMILY MEDICINE

## 2020-11-10 PROCEDURE — G9899 SCRN MAM PERF RSLTS DOC: HCPCS | Performed by: FAMILY MEDICINE

## 2020-11-10 PROCEDURE — G8417 CALC BMI ABV UP PARAM F/U: HCPCS | Performed by: FAMILY MEDICINE

## 2020-11-10 RX ORDER — SITAGLIPTIN AND METFORMIN HYDROCHLORIDE 100; 1000 MG/1; MG/1
1 TABLET, FILM COATED, EXTENDED RELEASE ORAL DAILY
Qty: 30 TABLET | Refills: 5 | Status: SHIPPED
Start: 2020-11-10 | End: 2020-11-10

## 2020-11-10 ASSESSMENT — PATIENT HEALTH QUESTIONNAIRE - PHQ9
SUM OF ALL RESPONSES TO PHQ9 QUESTIONS 1 & 2: 0
SUM OF ALL RESPONSES TO PHQ QUESTIONS 1-9: 0
1. LITTLE INTEREST OR PLEASURE IN DOING THINGS: 0
2. FEELING DOWN, DEPRESSED OR HOPELESS: 0
SUM OF ALL RESPONSES TO PHQ QUESTIONS 1-9: 0
SUM OF ALL RESPONSES TO PHQ QUESTIONS 1-9: 0

## 2020-11-10 ASSESSMENT — ENCOUNTER SYMPTOMS
SHORTNESS OF BREATH: 0
VOMITING: 0
NAUSEA: 0
DIARRHEA: 0

## 2020-11-10 NOTE — PATIENT INSTRUCTIONS

## 2020-11-10 NOTE — PROGRESS NOTES
f    OFFICE PROGRESS NOTE      SUBJECTIVE:        Patient ID:   Robby Vines is a 72 y.o. female who presents for   Chief Complaint   Patient presents with    Diabetes       HPI:   Patient is here to follow up on diabetes. Fasting blood sugars:not checking Midday blood sugars: not checking. Patient checks blood glucose 0 times per day. Patient is following diabetic diet. Patient is a smoker/nonsmoker. Last ophthalmology visit: 10/2020. Patient is taking a daily statin. Patient doing well on current regimen for hypertension. Patient doing well on current regimen for hypothyroidism. Prior to Admission medications    Medication Sig Start Date End Date Taking? Authorizing Provider   FEROSUL 325 (65 Fe) MG tablet  7/7/20  Yes Historical Provider, MD   atorvastatin (LIPITOR) 20 MG tablet TAKE 1 TABLET DAILY 7/28/20  Yes Alla Patten MD   losartan-hydrochlorothiazide [facility]) 100-25 MG per tablet Take 1 tablet by mouth daily 7/28/20  Yes Alla Patten MD   indomethacin (INDOCIN SR) 75 MG extended release capsule Take 1 capsule by mouth 2 times daily (with meals) 7/28/20  Yes Alla Patten MD   METRONIDAZOLE, TOPICAL, 0.75 % LOTN Apply to face BID 7/28/20  Yes Alla Patten MD   ketoconazole (NIZORAL) 2 % cream Apply topically daily.  7/28/20  Yes Alla Patten MD   allopurinol (ZYLOPRIM) 100 MG tablet Take 1 tablet by mouth daily 7/28/20  Yes Alla Patten MD   levothyroxine (SYNTHROID) 200 MCG tablet 400 mcg Monday-Thursday, 200 mcg other days 7/28/20  Yes Alla Patten MD   blood glucose test strips (FREESTYLE LITE) strip Check blood sugar qam 3/7/19  Yes Alla Patten MD   FREESTYLE LANCETS MISC Check blood sugar qam 3/7/19  Yes Alla Patten MD   triamcinolone (KENALOG) 0.1 % cream Apply bid to affected areas prn 3/7/19  Yes Alla Patten MD   Blood Glucose Monitoring Suppl (FREESTYLE LITE) LAYNE Check blood sugar qam 3/7/19  Yes Ronel Chambers MD   vitamin D (ERGOCALCIFEROL) 78013 units CAPS capsule Take 1 capsule by mouth once a week 10/3/18  Yes Ronel Chambers MD   Handtommy Magana MISC by Does not apply route Unable to ambulate more than 60 feet without difficulty  Expires 10/31/2022 10/25/17  Yes Ronel Chambers MD     Social History     Socioeconomic History    Marital status:      Spouse name: None    Number of children: None    Years of education: None    Highest education level: None   Occupational History    None   Social Needs    Financial resource strain: None    Food insecurity     Worry: None     Inability: None    Transportation needs     Medical: None     Non-medical: None   Tobacco Use    Smoking status: Never Smoker    Smokeless tobacco: Never Used   Substance and Sexual Activity    Alcohol use: Yes     Comment: social    Drug use: No    Sexual activity: None   Lifestyle    Physical activity     Days per week: None     Minutes per session: None    Stress: None   Relationships    Social connections     Talks on phone: None     Gets together: None     Attends Druze service: None     Active member of club or organization: None     Attends meetings of clubs or organizations: None     Relationship status: None    Intimate partner violence     Fear of current or ex partner: None     Emotionally abused: None     Physically abused: None     Forced sexual activity: None   Other Topics Concern    None   Social History Narrative    None       I have reviewed Gilma's allergies, medications, problem list, medical, social and family history and have updated as needed in the electronic medical record    Current Outpatient Medications   Medication Sig Dispense Refill    FEROSUL 325 (65 Fe) MG tablet       atorvastatin (LIPITOR) 20 MG tablet TAKE 1 TABLET DAILY 90 tablet 3    losartan-hydrochlorothiazide (HYZAAR) 100-25 MG per tablet Take 1 tablet by mouth daily 90 tablet 1    indomethacin (INDOCIN SR) 75 MG extended release capsule Take 1 capsule by mouth 2 times daily (with meals) 180 capsule 3    METRONIDAZOLE, TOPICAL, 0.75 % LOTN Apply to face BID 3 Bottle 3    ketoconazole (NIZORAL) 2 % cream Apply topically daily. 60 g 5    allopurinol (ZYLOPRIM) 100 MG tablet Take 1 tablet by mouth daily 90 tablet 1    levothyroxine (SYNTHROID) 200 MCG tablet 400 mcg Monday-Thursday, 200 mcg other days 132 tablet 1    blood glucose test strips (FREESTYLE LITE) strip Check blood sugar qam 150 each 3    FREESTYLE LANCETS MISC Check blood sugar qam 150 each 3    triamcinolone (KENALOG) 0.1 % cream Apply bid to affected areas prn 1200 g 3    Blood Glucose Monitoring Suppl (FREESTYLE LITE) LAYNE Check blood sugar qam 1 Device 0    vitamin D (ERGOCALCIFEROL) 91765 units CAPS capsule Take 1 capsule by mouth once a week 12 capsule 1    Handicap Placard MISC by Does not apply route Unable to ambulate more than 60 feet without difficulty  Expires 10/31/2022 1 each 0     No current facility-administered medications for this visit. Review Of Systems:    Review of Systems   Eyes: Positive for visual disturbance (blurry vision in mornings). Respiratory: Negative for shortness of breath. Cardiovascular: Negative for chest pain, palpitations and leg swelling. Gastrointestinal: Negative for diarrhea, nausea and vomiting. Genitourinary: Negative for difficulty urinating, dysuria and frequency. Skin: Negative for rash. Psychiatric/Behavioral: Negative for dysphoric mood. OBJECTIVE:     VS:  Wt Readings from Last 3 Encounters:   11/10/20 250 lb (113.4 kg)   07/28/20 250 lb (113.4 kg)   04/30/20 248 lb (112.5 kg)     Vitals:    11/10/20 0759   BP: 138/84   Pulse: 77   Resp: 20   SpO2: 96%       Physical Exam  Vitals signs reviewed.    Constitutional:       General: She is not in acute distress. Appearance: She is well-developed. Neck:      Musculoskeletal: Neck supple. Vascular: No carotid bruit. Cardiovascular:      Rate and Rhythm: Normal rate and regular rhythm. Heart sounds: Normal heart sounds. No murmur. No gallop. Pulmonary:      Effort: Pulmonary effort is normal.      Breath sounds: Normal breath sounds. No wheezing or rales. Abdominal:      General: Bowel sounds are normal. There is no distension. Palpations: Abdomen is soft. Tenderness: There is no abdominal tenderness. Musculoskeletal:      Right lower leg: No edema. Left lower leg: No edema. Skin:     General: Skin is warm and dry. Neurological:      Mental Status: She is alert and oriented to person, place, and time. Results for orders placed or performed in visit on 11/10/20   POCT glycosylated hemoglobin (Hb A1C)   Result Value Ref Range    Hemoglobin A1C 7.6 %         Aleisha Dos Santos was seen today for diabetes. Diagnoses and all orders for this visit:    Type 2 diabetes mellitus with hyperglycemia, without long-term current use of insulin (HCC)  -     POCT glycosylated hemoglobin (Hb A1C)  -     Start SITagliptin-metFORMIN HCl ER (JANUMET XR) 100-1000 MG TB24; Take 1 tablet by mouth daily    Hypothyroidism (acquired)  -     TSH without Reflex; Future        -     Continue Synthroid    Essential hypertension        -     Continue antihypertensive regimen    Need for prophylactic vaccination and inoculation against influenza  -     INFLUENZA, QUADV, ADJUVANTED, 72 YRS =, IM, PF, PREFILL SYR, 0.5ML (FLUAD)        BMI was elevated today, and weight loss plan recommended is : conventional weight loss. Phone/MyChart follow up if tests abnormal.    Return in about 3 months (around 2/10/2021) for diabetes. I have reviewed my findings and recommendations with Freddy Villeda.     Irina Rivera M.D

## 2020-11-10 NOTE — TELEPHONE ENCOUNTER
From: Tremaine Rosa  To: Enrique Obando MD  Sent: 11/10/2020 12:25 PM EST  Subject: Prescription Question    Hi, the Janumet you prescribed was going to cost me 430.00 because it is not a generic, and I have a 430.00 deductible for non generic meds. Needless to say, I didnt get it. Can I just try to watch what I eat for the next three months. I also forgot that I quit using Splenda in my tea, and started using real sugar because I read Splenda was bad for you, so that might have contributed to my A1C number. I am switching back to Splenda. ( I drink ALOT of tea) If you want me to do something different, let me know.  Thanks

## 2020-12-23 ENCOUNTER — OFFICE VISIT (OUTPATIENT)
Dept: FAMILY MEDICINE CLINIC | Age: 65
End: 2020-12-23
Payer: MEDICARE

## 2020-12-23 VITALS
HEIGHT: 65 IN | BODY MASS INDEX: 41.6 KG/M2 | SYSTOLIC BLOOD PRESSURE: 138 MMHG | RESPIRATION RATE: 20 BRPM | DIASTOLIC BLOOD PRESSURE: 78 MMHG | OXYGEN SATURATION: 97 % | HEART RATE: 63 BPM

## 2020-12-23 PROCEDURE — G8484 FLU IMMUNIZE NO ADMIN: HCPCS | Performed by: FAMILY MEDICINE

## 2020-12-23 PROCEDURE — 4040F PNEUMOC VAC/ADMIN/RCVD: CPT | Performed by: FAMILY MEDICINE

## 2020-12-23 PROCEDURE — G8400 PT W/DXA NO RESULTS DOC: HCPCS | Performed by: FAMILY MEDICINE

## 2020-12-23 PROCEDURE — G8417 CALC BMI ABV UP PARAM F/U: HCPCS | Performed by: FAMILY MEDICINE

## 2020-12-23 PROCEDURE — 1123F ACP DISCUSS/DSCN MKR DOCD: CPT | Performed by: FAMILY MEDICINE

## 2020-12-23 PROCEDURE — G8427 DOCREV CUR MEDS BY ELIG CLIN: HCPCS | Performed by: FAMILY MEDICINE

## 2020-12-23 PROCEDURE — 3017F COLORECTAL CA SCREEN DOC REV: CPT | Performed by: FAMILY MEDICINE

## 2020-12-23 PROCEDURE — 1036F TOBACCO NON-USER: CPT | Performed by: FAMILY MEDICINE

## 2020-12-23 PROCEDURE — 99213 OFFICE O/P EST LOW 20 MIN: CPT | Performed by: FAMILY MEDICINE

## 2020-12-23 PROCEDURE — G9899 SCRN MAM PERF RSLTS DOC: HCPCS | Performed by: FAMILY MEDICINE

## 2020-12-23 PROCEDURE — 1090F PRES/ABSN URINE INCON ASSESS: CPT | Performed by: FAMILY MEDICINE

## 2020-12-23 ASSESSMENT — ENCOUNTER SYMPTOMS
VOMITING: 0
DIARRHEA: 0
SHORTNESS OF BREATH: 0
NAUSEA: 0

## 2020-12-23 NOTE — PROGRESS NOTES
[address] [facility], [address]   [address] [facility]   So Paez MD     Patient: Jack Warner Birth: 1955  Visit Date: 12/23/20    Donovan Good is a 72y.o. year old female here today for   Chief Complaint   Patient presents with    Tailbone Pain     x 1year       HPI  Patient has had worsening tailbone pain. Started a year ago. Patient states it is worse with prolonged sitting, used to only bother her with sitting on hard chairs, but now notices with any seating. Had radiation of pain down right leg recently but resolved. Pain is 4/10, and resolves with position changes. Has had rare numbness/tingling down right leg. Denies injury or fall. Patient plans to use heat and cold compresses that she read about. Patient having difficulty losing weight. Patient gained 0 pounds since last visit. Review of Systems   Eyes: Positive for visual disturbance (occasional blurry vision). Respiratory: Negative for shortness of breath. Cardiovascular: Negative for chest pain, palpitations and leg swelling. Gastrointestinal: Negative for diarrhea, nausea and vomiting. Genitourinary: Negative for difficulty urinating, dysuria and frequency. Skin: Negative for rash. Past medical, surgical, social and/or family historyreviewed, updated as needed, and are non-contributory (unless otherwise stated). Medications, allergies, and problem list also reviewed and updated as needed in patient's record.      Current Outpatient Medications   Medication Sig Dispense Refill    FEROSUL 325 (65 Fe) MG tablet       atorvastatin (LIPITOR) 20 MG tablet TAKE 1 TABLET DAILY 90 tablet 3    losartan-hydrochlorothiazide (HYZAAR) 100-25 MG per tablet Take 1 tablet by mouth daily 90 tablet 1    indomethacin (INDOCIN SR) 75 MG extended release capsule Take 1 capsule by mouth 2 times daily (with meals) 180 capsule 3  METRONIDAZOLE, TOPICAL, 0.75 % LOTN Apply to face BID 3 Bottle 3    ketoconazole (NIZORAL) 2 % cream Apply topically daily. 60 g 5    allopurinol (ZYLOPRIM) 100 MG tablet Take 1 tablet by mouth daily 90 tablet 1    levothyroxine (SYNTHROID) 200 MCG tablet 400 mcg Monday-Thursday, 200 mcg other days 132 tablet 1    blood glucose test strips (FREESTYLE LITE) strip Check blood sugar qam 150 each 3    FREESTYLE LANCETS MISC Check blood sugar qam 150 each 3    triamcinolone (KENALOG) 0.1 % cream Apply bid to affected areas prn 1200 g 3    Blood Glucose Monitoring Suppl (FREESTYLE LITE) LAYNE Check blood sugar qam 1 Device 0    vitamin D (ERGOCALCIFEROL) 36151 units CAPS capsule Take 1 capsule by mouth once a week 12 capsule 1    Handicap Placard MISC by Does not apply route Unable to ambulate more than 60 feet without difficulty  Expires 10/31/2022 1 each 0     No current facility-administered medications for this visit. Wt Readings from Last 3 Encounters:   11/10/20 250 lb (113.4 kg)   07/28/20 250 lb (113.4 kg)   04/30/20 248 lb (112.5 kg)                   Vitals:    12/23/20 0822   BP: 138/78   Pulse: 63   Resp: 20   SpO2: 97%        Physical Exam  Vitals signs reviewed. Constitutional:       Appearance: She is well-developed. Cardiovascular:      Rate and Rhythm: Normal rate and regular rhythm. Heart sounds: Normal heart sounds. No murmur. No friction rub. Pulmonary:      Effort: Pulmonary effort is normal. No respiratory distress. Breath sounds: Normal breath sounds. No wheezing or rales. Abdominal:      General: Bowel sounds are normal. There is no distension. Palpations: Abdomen is soft. Tenderness: There is no abdominal tenderness. There is no guarding or rebound. Musculoskeletal:         General: Tenderness (lower lumbar paraspinal region) present. No swelling or deformity. Skin:     General: Skin is warm and dry.    Neurological: Mental Status: She is alert and oriented to person, place, and time. ASSESSMENT/PLAN  Isabell Sue was seen today for tailbone pain. Diagnoses and all orders for this visit:    Coccyx pain  -     XR LUMBAR SPINE (MIN 4 VIEWS); Future        -     Recommended patient use donut cushion while seated; okay to continue alternating cold and hot compresses    Morbid obesity with BMI of 40.0-44.9, adult (HCC)        -     Stable; will assess in 6 months        -     BMI was elevated today, and weight loss plan recommended is : conventional weight loss. Phone/MyChart follow up if tests abnormal.    Return for scheduled appointment. or sooner if necessary. I have reviewed my findings and recommendations with Isabell Sue.      Shonna Henderson M.D

## 2020-12-23 NOTE — PATIENT INSTRUCTIONS
Patient Education        Coccyx Pain: Care Instructions  Your Care Instructions     The coccyx is your tailbone. You can have pain in your tailbone from a fall or other injury. Pregnancy and childbirth also can cause tailbone pain. Sometimes, the cause of pain is not known. A tailbone injury causes pain when you sit, especially when you slump or sit on a hard seat. Straining to have a bowel movement also can be very painful. Tailbone injuries can take several months to heal, but in some cases the pain goes even longer. You can take steps at home to ease the pain. In some cases, a doctor injects a corticosteroid medicine into the coccyx to reduce swelling and pain. Follow-up care is a key part of your treatment and safety. Be sure to make and go to all appointments, and call your doctor if you are having problems. It's also a good idea to know your test results and keep a list of the medicines you take. How can you care for yourself at home? · Take pain medicines exactly as directed. ? If the doctor gave you a prescription medicine for pain, take it as prescribed. ? If you are not taking a prescription pain medicine, take an over-the-counter medicine to reduce pain. · Put ice or a cold pack on your tailbone for 10 to 20 minutes at a time. Try to do this every 1 to 2 hours for the next 3 days (when you are awake) or until the swelling goes down. Put a thin cloth between the ice and your skin. · About 2 or 3 days after your injury, you can alternate ice and heat. To soothe the tailbone area, take a warm bath for 20 minutes, 3 or 4 times a day. · Sit on soft, padded surfaces. A doughnut-shaped pillow can take pressure off the tailbone. · Avoid constipation, because straining to have a bowel movement will increase your tailbone pain. ? Include fruits, vegetables, beans, and whole grains in your diet each day. These foods are high in fiber. Here are some examples of typical rehabilitation exercises for your condition. Start each exercise slowly. Ease off the exercise if you start to have pain. Your doctor or physical therapist will tell you when you can start these exercises and which ones will work best for you. When you are not being active, find a comfortable position for rest. Some people are comfortable on the floor or a medium-firm bed with a small pillow under their head and another under their knees. Some people prefer to lie on their side with a pillow between their knees. Don't stay in one position for too long. Take short walks (10 to 20 minutes) every 2 to 3 hours. Avoid slopes, hills, and stairs until you feel better. Walk only distances you can manage without pain, especially leg pain. How to do the exercises  Back stretches   1. Get down on your hands and knees on the floor. 2. Relax your head and allow it to droop. Round your back up toward the ceiling until you feel a nice stretch in your upper, middle, and lower back. Hold this stretch for as long as it feels comfortable, or about 15 to 30 seconds. 3. Return to the starting position with a flat back while you are on your hands and knees. 4. Let your back sway by pressing your stomach toward the floor. Lift your buttocks toward the ceiling. 5. Hold this position for 15 to 30 seconds. 6. Repeat 2 to 4 times. Follow-up care is a key part of your treatment and safety. Be sure to make and go to all appointments, and call your doctor if you are having problems. It's also a good idea to know your test results and keep a list of the medicines you take. Where can you learn more? Go to https://[employer]. org and sign in to your [employer] account. Enter G615 in the [place] box to learn more about \"[employer]: Exercises. \"     If you do not have an account, please click on the \"Sign Up Now\" link. Current as of: March 2, 2020               Content Version: 12.6  © 9153-0511 [employer]. Care instructions adapted under license by [facility] ([facility]). If you have questions about a medical condition or this instruction, always ask your healthcare professional. [address] any warranty or liability for your use of this information.

## 2020-12-27 ENCOUNTER — PATIENT MESSAGE (OUTPATIENT)
Dept: FAMILY MEDICINE CLINIC | Age: 65
End: 2020-12-27

## 2020-12-28 NOTE — TELEPHONE ENCOUNTER
From: Noble Ang  To: Vanessa Everett MD  Sent: 12/27/2020 9:17 AM EST  Subject: Non-Urgent Medical Question    Hi, I have a couple questions about the X-ray I got last week. First, I wanted to make sure they xrayed all the way down to my tailbone, not just my lower back. Also, can the pain I have in my tailbone come from the arthritis in my back?  Thanks

## 2021-02-05 ENCOUNTER — HOSPITAL ENCOUNTER (EMERGENCY)
Age: 66
Discharge: HOME | End: 2021-02-05
Payer: MEDICARE

## 2021-02-05 ENCOUNTER — APPOINTMENT (OUTPATIENT)
Dept: GENERAL RADIOLOGY | Age: 66
End: 2021-02-05
Payer: MEDICARE

## 2021-02-05 VITALS
DIASTOLIC BLOOD PRESSURE: 90 MMHG | WEIGHT: 250 LBS | HEIGHT: 65 IN | BODY MASS INDEX: 41.65 KG/M2 | TEMPERATURE: 97.3 F | RESPIRATION RATE: 18 BRPM | SYSTOLIC BLOOD PRESSURE: 180 MMHG | OXYGEN SATURATION: 97 % | HEART RATE: 84 BPM

## 2021-02-05 DIAGNOSIS — M79.604 PAIN OF RIGHT LOWER EXTREMITY: Primary | ICD-10-CM

## 2021-02-05 PROCEDURE — 73590 X-RAY EXAM OF LOWER LEG: CPT

## 2021-02-05 PROCEDURE — 99212 OFFICE O/P EST SF 10 MIN: CPT

## 2021-02-05 RX ORDER — HYDROCODONE BITARTRATE AND ACETAMINOPHEN 5; 325 MG/1; MG/1
1 TABLET ORAL EVERY 6 HOURS PRN
Qty: 12 TABLET | Refills: 0 | Status: SHIPPED | OUTPATIENT
Start: 2021-02-05 | End: 2021-02-08

## 2021-02-05 ASSESSMENT — PAIN DESCRIPTION - DESCRIPTORS: DESCRIPTORS: SHARP

## 2021-02-05 ASSESSMENT — PAIN DESCRIPTION - FREQUENCY: FREQUENCY: INTERMITTENT

## 2021-02-05 ASSESSMENT — PAIN DESCRIPTION - ONSET: ONSET: ON-GOING

## 2021-02-05 ASSESSMENT — PAIN DESCRIPTION - PAIN TYPE
TYPE: ACUTE PAIN
TYPE: ACUTE PAIN

## 2021-02-05 ASSESSMENT — PAIN - FUNCTIONAL ASSESSMENT: PAIN_FUNCTIONAL_ASSESSMENT: 0-10

## 2021-02-05 ASSESSMENT — PAIN SCALES - GENERAL: PAINLEVEL_OUTOF10: 5

## 2021-02-05 ASSESSMENT — PAIN DESCRIPTION - ORIENTATION: ORIENTATION: RIGHT

## 2021-02-05 NOTE — ED PROVIDER NOTES
Department of Emergency Medicine  [address] [place] [address]  Provider Note  Admit Date/Time: 2021  8:56 AM  Room:   NAME: Zora Cooper  : 1955  MRN: [id number]     Chief Complaint:  Knee Pain (TKR Right 3 x's/ Pt states \"I have a pin that may have moved & is rubbing on the bone so I'd like to have it Xray'd to see if the pin has moved\" )    History of Present Illness        Zora Cooper is a 72 y.o. female who has a past medical history of:   Past Medical History:   Diagnosis Date    Basedow's disease 2016    Overview:  Marek Davenport, [facility] [address] Rx,     Chronic nonalcoholic liver disease 3/75/5013    Hyperlipidemia     Hypertension     Hypothyroidism     Malignant neoplasm of ovary ([facility] [address]     Obesity     Positive FIT (fecal immunochemical test) 2018    Type II or unspecified type diabetes mellitus without mention of complication, not stated as uncontrolled     Unspecified vitamin D deficiency 2016    presents to the urgent care center by private car for the low of her right leg. She said that this is been going on for about a month but yesterday it was worse. She said today is not so bad it mainly hurts when she ambulates. She says she has a long pin from the knee to the mid tib-fib area and she has had that prior and the pin moved and she had to have it redone she is concerned that maybe that pin has moved again since she is having this pain. There was no fall there was no injury. Her last replacement was 2 years ago. ROS    Pertinent positives and negatives are stated within HPI, all other systems reviewed and are negative.     Past Surgical History:   Procedure Laterality Date    COLONOSCOPY      HYSTERECTOMY      JOINT REPLACEMENT      total knees bilateral    KNEE SURGERY Bilateral 2009    AL COLONOSCOPY FLX DX W/COLLJ SPEC WHEN PFRMD N/A 2018    COLONOSCOPY performed by Doris Wooten MD at [place] [address] Social History:  reports that she has never smoked. She has never used smokeless tobacco. She reports current alcohol use. She reports that she does not use drugs. Family History: family history includes Early Death in her mother. Allergies: Iodine    Physical Exam   Oxygen Saturation Interpretation: Normal.   ED Triage Vitals [02/05/21 0858]   BP Temp Temp src Pulse Resp SpO2 Height Weight   (!) 180/90 97.3 °F (36.3 °C) -- 84 18 97 % 5' 5\" (1.651 m) 250 lb (113.4 kg)       Physical Exam  · Constitutional/General: Alert and oriented x3, well appearing, non toxic in NAD  · HEENT:  NC/NT. Clear conjunctiva  Airway patent. · Neck: Supple, full ROM,   · Respiratory:  Not in respiratory distress  · CV:  Regular rate. Regular rhythm. · GI:  Abdomen Soft, Non tender,   · Musculoskeletal: Moves all extremities x 4. Warm and well perfused, no clubbing, cyanosis, or edema. Capillary refill <3 seconds  · Integument: skin warm and dry. No rashes. · Lymphatic: no lymphadenopathy noted  · Neurologic: GCS 15, no focal deficits,   · Psychiatric: Normal Affect    Lab / Imaging Results   (All laboratory and radiology results have been personally reviewed by myself)  Labs:  No results found for this visit on 02/05/21. Imaging: All Radiology results interpreted by Radiologist unless otherwise noted. 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           ED Course / Medical Decision Making   Medications - No data to display MDM:   Patient here for evaluation of right lower extremity pain she is afraid that her knee replacement pin that said the tibia has moved she said she is having pain this happened to her a few years ago and she had to have a replacement. again this is her third knee replacement. This started bothering her about a month ago and yesterday it intensified. Mainly hurts when she walks I did obtain an x-ray and it does show that there is possible loosening of the tibial component of the knee arthroplasty. Her orthopedic surgeon is in [facility].  She will contact him as soon as possible regarding the need to be evaluated for this. I did order her some [place] for pain since this is painful and if she has any other worsening problems she should go to the ER. Assessment      1. Pain of right lower extremity      Plan   Discharge to home and advised to contact Tomeka Villarreal MD  [address] [place] [id number]    Schedule an appointment as soon as possible for a visit   Your blood pressure is too high today    Follow up with your Orthopedic Dr at [address] [place] [address] an appointment as soon as possible for a visit      Patient condition is good    New Medications     New Prescriptions    HYDROCODONE-ACETAMINOPHEN (NORCO) 5-325 MG PER TABLET    Take 1 tablet by mouth every 6 hours as needed for Pain for up to 3 days. Electronically signed by RYLEE Kaur CNP   DD: 2/5/21  **This report was transcribed using voice recognition software. Every effort was made to ensure accuracy; however, inadvertent computerized transcription errors may be present.   END OF ED PROVIDER NOTE     RYLEE Kaur CNP  02/05/21 0945

## 2021-02-11 ENCOUNTER — OFFICE VISIT (OUTPATIENT)
Dept: FAMILY MEDICINE CLINIC | Age: 66
End: 2021-02-11
Payer: MEDICARE

## 2021-02-11 VITALS
SYSTOLIC BLOOD PRESSURE: 138 MMHG | BODY MASS INDEX: 41.6 KG/M2 | HEART RATE: 82 BPM | DIASTOLIC BLOOD PRESSURE: 78 MMHG | RESPIRATION RATE: 20 BRPM | HEIGHT: 65 IN | OXYGEN SATURATION: 96 %

## 2021-02-11 DIAGNOSIS — I10 ESSENTIAL HYPERTENSION: ICD-10-CM

## 2021-02-11 DIAGNOSIS — M79.661 PAIN IN RIGHT SHIN: ICD-10-CM

## 2021-02-11 DIAGNOSIS — E11.65 TYPE 2 DIABETES MELLITUS WITH HYPERGLYCEMIA, WITHOUT LONG-TERM CURRENT USE OF INSULIN (HCC): Primary | ICD-10-CM

## 2021-02-11 DIAGNOSIS — E03.9 HYPOTHYROIDISM (ACQUIRED): ICD-10-CM

## 2021-02-11 LAB
CREATININE URINE POCT: NORMAL
HBA1C MFR BLD: 7.2 %
MICROALBUMIN/CREAT 24H UR: NORMAL MG/G{CREAT}
MICROALBUMIN/CREAT UR-RTO: NORMAL
TSH SERPL DL<=0.05 MIU/L-ACNC: 0.95 UIU/ML (ref 0.27–4.2)

## 2021-02-11 PROCEDURE — 2022F DILAT RTA XM EVC RTNOPTHY: CPT | Performed by: FAMILY MEDICINE

## 2021-02-11 PROCEDURE — G8484 FLU IMMUNIZE NO ADMIN: HCPCS | Performed by: FAMILY MEDICINE

## 2021-02-11 PROCEDURE — 4040F PNEUMOC VAC/ADMIN/RCVD: CPT | Performed by: FAMILY MEDICINE

## 2021-02-11 PROCEDURE — 3051F HG A1C>EQUAL 7.0%<8.0%: CPT | Performed by: FAMILY MEDICINE

## 2021-02-11 PROCEDURE — 82044 UR ALBUMIN SEMIQUANTITATIVE: CPT | Performed by: FAMILY MEDICINE

## 2021-02-11 PROCEDURE — 1090F PRES/ABSN URINE INCON ASSESS: CPT | Performed by: FAMILY MEDICINE

## 2021-02-11 PROCEDURE — 1036F TOBACCO NON-USER: CPT | Performed by: FAMILY MEDICINE

## 2021-02-11 PROCEDURE — 1123F ACP DISCUSS/DSCN MKR DOCD: CPT | Performed by: FAMILY MEDICINE

## 2021-02-11 PROCEDURE — 99214 OFFICE O/P EST MOD 30 MIN: CPT | Performed by: FAMILY MEDICINE

## 2021-02-11 PROCEDURE — 3017F COLORECTAL CA SCREEN DOC REV: CPT | Performed by: FAMILY MEDICINE

## 2021-02-11 PROCEDURE — G8427 DOCREV CUR MEDS BY ELIG CLIN: HCPCS | Performed by: FAMILY MEDICINE

## 2021-02-11 PROCEDURE — 83036 HEMOGLOBIN GLYCOSYLATED A1C: CPT | Performed by: FAMILY MEDICINE

## 2021-02-11 PROCEDURE — G8400 PT W/DXA NO RESULTS DOC: HCPCS | Performed by: FAMILY MEDICINE

## 2021-02-11 PROCEDURE — G8417 CALC BMI ABV UP PARAM F/U: HCPCS | Performed by: FAMILY MEDICINE

## 2021-02-11 RX ORDER — LOSARTAN POTASSIUM AND HYDROCHLOROTHIAZIDE 25; 100 MG/1; MG/1
1 TABLET ORAL DAILY
Qty: 90 TABLET | Refills: 1 | Status: SHIPPED
Start: 2021-02-11 | End: 2021-11-24 | Stop reason: SDUPTHER

## 2021-02-11 ASSESSMENT — PATIENT HEALTH QUESTIONNAIRE - PHQ9: SUM OF ALL RESPONSES TO PHQ QUESTIONS 1-9: 0

## 2021-02-11 NOTE — PATIENT INSTRUCTIONS

## 2021-02-11 NOTE — PROGRESS NOTES
FREESTYLE LANCETS MISC Check blood sugar qam 3/7/19  Yes Jim Robertson MD   triamcinolone (KENALOG) 0.1 % cream Apply bid to affected areas prn 3/7/19  Yes Jim Robertson MD   Blood Glucose Monitoring Suppl (FREESTYLE LITE) LAYNE Check blood sugar qam 3/7/19  Yes Jim Robertson MD   vitamin D (ERGOCALCIFEROL) 31336 units CAPS capsule Take 1 capsule by mouth once a week 10/3/18  Yes Jim Robertson MD   Handicap Placard MISC by Does not apply route Unable to ambulate more than 60 feet without difficulty  Expires 10/31/2022 10/25/17  Yes Jim Robertson MD     Social History     Socioeconomic History    Marital status:      Spouse name: None    Number of children: None    Years of education: None    Highest education level: None   Occupational History    None   Social Needs    Financial resource strain: None    Food insecurity     Worry: None     Inability: None    Transportation needs     Medical: None     Non-medical: None   Tobacco Use    Smoking status: Never Smoker    Smokeless tobacco: Never Used   Substance and Sexual Activity    Alcohol use: Yes     Comment: social    Drug use: No    Sexual activity: Not Currently   Lifestyle    Physical activity     Days per week: None     Minutes per session: None    Stress: None   Relationships    Social connections     Talks on phone: None     Gets together: None     Attends [employer] service: None     Active member of club or organization: None     Attends meetings of clubs or organizations: None     Relationship status: None    Intimate partner violence     Fear of current or ex partner: None     Emotionally abused: None     Physically abused: None     Forced sexual activity: None   Other Topics Concern    None   Social History Narrative    None I have reviewed Gilma's allergies, medications, problem list, medical, social and family history and have updated as needed in the electronic medical record    Current Outpatient Medications   Medication Sig Dispense Refill    losartan-hydroCHLOROthiazide (HYZAAR) 100-25 MG per tablet Take 1 tablet by mouth daily 90 tablet 1    metFORMIN (GLUCOPHAGE) 1000 MG tablet Take 1 tablet by mouth 2 times daily (with meals) 180 tablet 1    atorvastatin (LIPITOR) 20 MG tablet TAKE 1 TABLET DAILY 90 tablet 3    indomethacin (INDOCIN SR) 75 MG extended release capsule Take 1 capsule by mouth 2 times daily (with meals) 180 capsule 3    METRONIDAZOLE, TOPICAL, 0.75 % LOTN Apply to face BID 3 Bottle 3    ketoconazole (NIZORAL) 2 % cream Apply topically daily. 60 g 5    allopurinol (ZYLOPRIM) 100 MG tablet Take 1 tablet by mouth daily 90 tablet 1    levothyroxine (SYNTHROID) 200 MCG tablet 400 mcg Monday-Thursday, 200 mcg other days 132 tablet 1    blood glucose test strips (FREESTYLE LITE) strip Check blood sugar qam 150 each 3    FREESTYLE LANCETS MISC Check blood sugar qam 150 each 3    triamcinolone (KENALOG) 0.1 % cream Apply bid to affected areas prn 1200 g 3    Blood Glucose Monitoring Suppl (FREESTYLE LITE) LAYNE Check blood sugar qam 1 Device 0    vitamin D (ERGOCALCIFEROL) 90164 units CAPS capsule Take 1 capsule by mouth once a week 12 capsule 1    Handicap Placard MISC by Does not apply route Unable to ambulate more than 60 feet without difficulty  Expires 10/31/2022 1 each 0     No current facility-administered medications for this visit. Review Of Systems:    Review of Systems   Eyes: Negative for visual disturbance. Respiratory: Negative for shortness of breath. Cardiovascular: Negative for chest pain, palpitations and leg swelling. Gastrointestinal: Negative for diarrhea, nausea and vomiting. Genitourinary: Negative for difficulty urinating, dysuria and frequency. -     losartan-hydroCHLOROthiazide (HYZAAR) 100-25 MG per tablet; Take 1 tablet by mouth daily    Pain in right shin        -     Will continue to monitor symptoms; patient reassured that symptoms will likely improve with time      BMI was elevated today, and weight loss plan recommended is : conventional weight loss. Phone/MyChart follow up if tests abnormal.    Return in about 3 months (around 5/11/2021). I have reviewed my findings and recommendations with Isaiah Larsen.     Gabby Hardy M.D

## 2021-02-13 ASSESSMENT — ENCOUNTER SYMPTOMS
VOMITING: 0
SHORTNESS OF BREATH: 0
DIARRHEA: 0
NAUSEA: 0

## 2021-05-11 ENCOUNTER — OFFICE VISIT (OUTPATIENT)
Dept: FAMILY MEDICINE CLINIC | Age: 66
End: 2021-05-11
Payer: MEDICARE

## 2021-05-11 VITALS
HEIGHT: 65 IN | BODY MASS INDEX: 41.65 KG/M2 | OXYGEN SATURATION: 98 % | SYSTOLIC BLOOD PRESSURE: 134 MMHG | RESPIRATION RATE: 20 BRPM | WEIGHT: 250 LBS | DIASTOLIC BLOOD PRESSURE: 74 MMHG | HEART RATE: 82 BPM

## 2021-05-11 DIAGNOSIS — E03.9 HYPOTHYROIDISM (ACQUIRED): ICD-10-CM

## 2021-05-11 DIAGNOSIS — E66.01 MORBID OBESITY WITH BMI OF 40.0-44.9, ADULT (HCC): ICD-10-CM

## 2021-05-11 DIAGNOSIS — Z00.00 ROUTINE GENERAL MEDICAL EXAMINATION AT A HEALTH CARE FACILITY: Primary | ICD-10-CM

## 2021-05-11 DIAGNOSIS — E11.65 TYPE 2 DIABETES MELLITUS WITH HYPERGLYCEMIA, WITHOUT LONG-TERM CURRENT USE OF INSULIN (HCC): ICD-10-CM

## 2021-05-11 LAB — HBA1C MFR BLD: 7.6 %

## 2021-05-11 PROCEDURE — 3051F HG A1C>EQUAL 7.0%<8.0%: CPT | Performed by: FAMILY MEDICINE

## 2021-05-11 PROCEDURE — 4040F PNEUMOC VAC/ADMIN/RCVD: CPT | Performed by: FAMILY MEDICINE

## 2021-05-11 PROCEDURE — 3017F COLORECTAL CA SCREEN DOC REV: CPT | Performed by: FAMILY MEDICINE

## 2021-05-11 PROCEDURE — 1123F ACP DISCUSS/DSCN MKR DOCD: CPT | Performed by: FAMILY MEDICINE

## 2021-05-11 PROCEDURE — G0402 INITIAL PREVENTIVE EXAM: HCPCS | Performed by: FAMILY MEDICINE

## 2021-05-11 RX ORDER — INDOMETHACIN 75 MG/1
75 CAPSULE, EXTENDED RELEASE ORAL 2 TIMES DAILY WITH MEALS
Qty: 180 CAPSULE | Refills: 3 | Status: SHIPPED
Start: 2021-05-11 | End: 2021-11-24 | Stop reason: SDUPTHER

## 2021-05-11 RX ORDER — LEVOTHYROXINE SODIUM 0.2 MG/1
TABLET ORAL
Qty: 132 TABLET | Refills: 1 | Status: SHIPPED
Start: 2021-05-11 | End: 2021-08-12 | Stop reason: DRUGHIGH

## 2021-05-11 NOTE — PROGRESS NOTES
Medicare Annual Wellness Visit  Name: Brooke Young Date: 5/15/2021   MRN: <[id number]> Sex: Female   Age: 77 y.o. Ethnicity: Non-/Non    : 1955 Race: Romayne Nim is here for [facility]    Patient is here to follow up on diabetes. Fasting blood sugars:not checking Midday blood sugars: not checking. Patient checks blood glucose 0 times per day. Patient is following diabetic diet. Patient is a smoker/nonsmoker. Last ophthalmology visit: 2020. Patient is taking a daily statin. Screenings for behavioral, psychosocial and functional/safety risks, and cognitive dysfunction are all negative except as indicated below. These results, as well as other patient data from the [address] form, are documented in Flowsheets linked to this Encounter. Allergies   Allergen Reactions    Iodine      fish allergy         Prior to Visit Medications    Medication Sig Taking? Authorizing Provider   levothyroxine (SYNTHROID) 200 MCG tablet 400 mcg Monday-Thursday, 200 mcg other days Yes Dashawn Lopez MD   indomethacin (INDOCIN SR) 75 MG extended release capsule Take 1 capsule by mouth 2 times daily (with meals) Yes Dashawn Lopez MD   losartan-hydroCHLOROthiazide (HYZAAR) 100-25 MG per tablet Take 1 tablet by mouth daily Yes Dashawn Lopez MD   metFORMIN (GLUCOPHAGE) 1000 MG tablet Take 1 tablet by mouth 2 times daily (with meals) Yes Dashawn Lopez MD   atorvastatin (LIPITOR) 20 MG tablet TAKE 1 TABLET DAILY Yes Dashawn Lopez MD   METRONIDAZOLE, TOPICAL, 0.75 % LOTN Apply to face BID Yes Dashawn Lopez MD   ketoconazole (NIZORAL) 2 % cream Apply topically daily.  Yes Dashawn Lopez MD   allopurinol (ZYLOPRIM) 100 MG tablet Take 1 tablet by mouth daily Yes Dashawn Lopez MD   blood glucose test strips (FREESTYLE LITE) strip Check blood sugar qam Yes Fernandez Andrea MD Diana   FREESTYLE LANCETS MISC Check blood sugar qam Yes Casandra Addison MD   triamcinolone (KENALOG) 0.1 % cream Apply bid to affected areas prn Yes Casandra Addison MD   Blood Glucose Monitoring Suppl (FREESTYLE LITE) LAYNE Check blood sugar qam Yes Casandra Addison MD   vitamin D (ERGOCALCIFEROL) 22533 units CAPS capsule Take 1 capsule by mouth once a week Yes Casandra Addison MD   Handicap Placard MISC by Does not apply route Unable to ambulate more than 60 feet without difficulty  Expires 10/31/2022 Yes Casandra Addison MD         Past Medical History:   Diagnosis Date    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    Overview:  [facility] [address] [facility], 2001    Chronic nonalcoholic liver disease 7/23/6534    Hyperlipidemia     Hypertension     Hypothyroidism     Malignant neoplasm of ovary ([facility] [address] 2001    Obesity     Positive FIT (fecal immunochemical test) 4/26/2018    Type II or unspecified type diabetes mellitus without mention of complication, not stated as uncontrolled     Unspecified vitamin D deficiency 1/14/2016       Past Surgical History:   Procedure Laterality Date    COLONOSCOPY      HYSTERECTOMY      JOINT REPLACEMENT      total knees bilateral    KNEE SURGERY Bilateral 2006/ 2009    NV COLONOSCOPY FLX DX W/COLLJ SPEC WHEN PFRMD N/A 5/30/2018    COLONOSCOPY performed by Joel Desai MD at [address] [place] [address] History   Problem Relation Age of Onset    Early Death Mother        CareTeam (Including outside providers/suppliers regularly involved in providing care):   Patient Care Team:  Casandra Addison MD as PCP - General (Family Medicine)  Casandra Addison MD as PCP - [facility]    Wt Readings from Last 3 Encounters:   05/11/21 250 lb (113.4 kg)   02/05/21 250 lb (113.4 kg)   11/10/20 250 lb (113.4 kg)     Vitals:    05/11/21 0800   BP: 134/74   Pulse: 82   Resp: 20   SpO2: 98%   Weight: 250 lb Habits/Nutrition:  Health Habits/Nutrition  Do you exercise for at least 20 minutes 2-3 times per week?: (!) No  Have you lost any weight without trying in the past 3 months?: No  Do you eat only one meal per day?: No  Have you seen the dentist within the past year?: Yes  Body mass index: (!) 41.6  Health Habits/Nutrition Interventions:  · Nutritional issues:  educational materials to promote weight loss provided    Hearing/Vision:  No exam data present  Hearing/Vision  Do you or your family notice any trouble with your hearing that hasn't been managed with hearing aids?: No  Do you have difficulty driving, watching TV, or doing any of your daily activities because of your eyesight?: No  Have you had an eye exam within the past year?: (!) No  Hearing/Vision Interventions:  · Vision concerns:  patient encouraged to make appointment with his/her eye specialist      Personalized Preventive Plan   Current Health Maintenance Status  Immunization History   Administered Date(s) Administered    Influenza, Quadv, adjuvanted, 65 yrs +, IM, PF (Fluad) 11/10/2020    Pneumococcal Conjugate 13-valent (Yobany Montano) 04/30/2020        Health Maintenance   Topic Date Due    Hepatitis C screen  Never done    COVID-19 Vaccine (1) Never done    DTaP/Tdap/Td vaccine (1 - Tdap) Never done    Shingles Vaccine (1 of 2) Never done    DEXA (modify frequency per FRAX score)  Never done    Diabetic foot exam  09/19/2020    Diabetic retinal exam  10/22/2020    Annual Wellness Visit (AWV)  Never done    Lipid screen  11/11/2020    Potassium monitoring  11/11/2020    Creatinine monitoring  11/11/2020    Pneumococcal 65+ years Vaccine (2 of 2 - PPSV23) 04/30/2021    Diabetic microalbuminuria test  02/11/2022    TSH testing  02/11/2022    A1C test (Diabetic or Prediabetic)  05/11/2022    Breast cancer screen  11/16/2022    Colon cancer screen colonoscopy  05/30/2028    Flu vaccine  Completed    Hepatitis A vaccine  Aged C/ Abdullahi Fidel 19 Hib vaccine  Aged Out    Meningococcal (ACWY) vaccine  Aged Out     Recommendations for Lovely Due: see orders and patient instructions/AVS.  . Recommended screening schedule for the next 5-10 years is provided to the patient in written form: see Patient Instructions/AVS.    Haile Damon was seen today for medicare aw. Diagnoses and all orders for this visit:    Routine general medical examination at a health care facility    Type 2 diabetes mellitus with hyperglycemia, without long-term current use of insulin (HCC)  -     POCT glycosylated hemoglobin (Hb A1C)  -     Lipid Panel; Future  -     Comprehensive Metabolic Panel; Future  -     TSH without Reflex; Future  -     CBC;  Future        -     Worsening; will assess in 3 months    Hypothyroidism (acquired)  -     levothyroxine (SYNTHROID) 200 MCG tablet; 400 mcg Monday-Thursday, 200 mcg other days    Morbid obesity with BMI of 40.0-44.9, adult (HCC)         -     Stable; will assess yearly

## 2021-05-11 NOTE — PATIENT INSTRUCTIONS
good cholesterol, this type of cholesterol actually carries cholesterol away from your arteries and may, therefore, help lower your risk of having a heart attack. You want this level to be high (ideally greater than 60). It is a risk to have a level less than 40. You can raise this good cholesterol by eating olive oil, canola oil, avocados, or nuts. Exercise raises this level, too. Fat    Fat is calorie dense and packs a lot of calories into a small amount of food. Even though fats should be limited due to their high calorie content, not all fats are bad. In fact, some fats are quite healthful. Fat can be broken down into four main types. The good-for-you fats are:   Monounsaturated fat  found in oils such as olive and canola, avocados, and nuts and natural nut butters; can decrease cholesterol levels, while keeping levels of HDL cholesterol high   Polyunsaturated fat  found in oils such as safflower, sunflower, soybean, corn, and sesame; can decrease total cholesterol and LDL cholesterol   Omega-3 fatty acids  particularly those found in fatty fish (such as salmon, trout, tuna, mackerel, herring, and sardines); can decrease risk of arrhythmias, decrease triglyceride levels, and slightly lower blood pressure   The fats that you want to limit are:   Saturated fat  found in animal products, many fast foods, and a few vegetables; increases total blood cholesterol, including LDL levels   Animal fats that are saturated include: butter, lard, whole-milk dairy products, meat fat, and poultry skin   Vegetable fats that are saturated include: hydrogenated shortening, palm oil, coconut oil, cocoa butter   Hydrogenated or trans fat  found in margarine and vegetable shortening, most shelf stable snack foods, and fried foods; increases LDL and decreases HDL     It is generally recommended that you limit your total fat for the day to less than 30% of your total calories.  If you follow an 1800-calorie heart healthy diet, for example, this would mean 60 grams of fat or less per day. Saturated fat and trans fat in your diet raises your blood cholesterol the most, much more than dietary cholesterol does. For this reason, on a heart-healthy diet, less than 7% of your calories should come from saturated fat and ideally 0% from trans fat. On an 1800-calorie diet, this translates into less than 14 grams of saturated fat per day, leaving 46 grams of fat to come from mono- and polyunsaturated fats.    Food Choices on a Heart Healthy Diet   Food Category   Foods Recommended   Foods to Avoid   Grains   Breads and rolls without salted tops Most dry and cooked cereals Unsalted crackers and breadsticks Low-sodium or homemade breadcrumbs or stuffing All rice and pastas   Breads, rolls, and crackers with salted tops High-fat baked goods (eg, muffins, donuts, pastries) Quick breads, self-rising flour, and biscuit mixes Regular bread crumbs Instant hot cereals Commercially prepared rice, pasta, or stuffing mixes   Vegetables   Most fresh, frozen, and low-sodium canned vegetables Low-sodium and salt-free vegetable juices Canned vegetables if unsalted or rinsed   Regular canned vegetables and juices, including sauerkraut and pickled vegetables Frozen vegetables with sauces Commercially prepared potato and vegetable mixes   Fruits   Most fresh, frozen, and canned fruits All fruit juices   Fruits processed with salt or sodium   Milk   Nonfat or low-fat (1%) milk Nonfat or low-fat yogurt Cottage cheese, low-fat ricotta, cheeses labeled as low-fat and low-sodium   Whole milk Reduced-fat (2%) milk Malted and chocolate milk Full fat yogurt Most cheeses (unless low-fat and low salt) Buttermilk (no more than 1 cup per week)   Meats and Beans   Lean cuts of fresh or frozen beef, veal, lamb, or pork (look for the word loin) Fresh or frozen poultry without the skin Fresh or frozen fish and some shellfish Egg whites and egg substitutes (Limit whole eggs to three per week) Tofu Nuts or seeds (unsalted, dry-roasted), low-sodium peanut butter Dried peas, beans, and lentils   Any smoked, cured, salted, or canned meat, fish, or poultry (including montaño, chipped beef, cold cuts, hot dogs, sausages, sardines, and anchovies) Poultry skins Breaded and/or fried fish or meats Canned peas, beans, and lentils Salted nuts   Fats and Oils   Olive oil and canola oil Low-sodium, low-fat salad dressings and mayonnaise   Butter, margarine, coconut and palm oils, montaño fat   Snacks, Sweets, and Condiments   Low-sodium or unsalted versions of broths, soups, soy sauce, and condiments Pepper, herbs, and spices; vinegar, lemon, or lime juice Low-fat frozen desserts (yogurt, sherbet, fruit bars) Sugar, cocoa powder, honey, syrup, jam, and preserves Low-fat, trans-fat free cookies, cakes, and pies Reji and animal crackers, fig bars, robinson snaps   High-fat desserts Broth, soups, gravies, and sauces, made from instant mixes or other high-sodium ingredients Salted snack foods Canned olives Meat tenderizers, seasoning salt, and most flavored vinegars   Beverages   Low-sodium carbonated beverages Tea and coffee in moderation Soy milk   Commercially softened water   Suggestions   Make whole grains, fruits, and vegetables the base of your diet. Choose heart-healthy fats such as canola, olive, and flaxseed oil, and foods high in heart-healthy fats, such as nuts, seeds, soybeans, tofu, and fish. Eat fish at least twice per week; the fish highest in omega-3 fatty acids and lowest in mercury include salmon, herring, mackerel, sardines, and canned chunk light tuna. If you eat fish less than twice per week or have high triglycerides, talk to your doctor about taking fish oil supplements. Read food labels.    For products low in fat and cholesterol, look for fat free, low-fat, cholesterol free, saturated fat free, and trans fat freeAlso scan the Nutrition Facts Label, which lists saturated fat, trans fat, and cholesterol amounts. For products low in sodium, look for sodium free, very low sodium, low sodium, no added salt, and unsalted   Skip the salt when cooking or at the table; if food needs more flavor, get creative and try out different herbs and spices. Garlic and onion also add substantial flavor to foods. Trim any visible fat off meat and poultry before cooking, and drain the fat off after pavon. Use cooking methods that require little or no added fat, such as grilling, boiling, baking, poaching, broiling, roasting, steaming, stir-frying, and sauting. Avoid fast food and convenience food. They tend to be high in saturated and trans fat and have a lot of added salt. Talk to a registered dietitian for individualized diet advice. Last Reviewed: March 2011 Chalo Rodriguez MS, MPH, RD   Updated: 3/29/2011   ·     Keeping Home a [facility]       As we get older, changes in balance, gait, strength, vision, hearing, and cognition make even the most youthful senior more prone to accidents. Falls are one of the leading health risks for older people. This increased risk of falling is related to:   Aging process (eg, decreased muscle strength, slowed reflexes)   Higher incidence of chronic health problems (eg, arthritis, diabetes) that may limit mobility, agility or sensory awareness   Side effects of medicine (eg, dizziness, blurred vision)especially medicines like prescription pain medicines and drugs used to treat mental health conditions   Depending on the brittleness of your bones, the consequences of a fall can be serious and long lasting. Home Life   Research by the Association of Aging [facility]) shows that some home accidents among older adults can be prevented by making simple lifestyle changes and basic modifications and repairs to the home environment. Here are some lifestyle changes that experts recommend:   Have your hearing and vision checked regularly.  Be sure to wear prescription glasses that are right for you. Speak to your doctor or pharmacist about the possible side effects of your medicines. A number of medicines can cause dizziness. If you have problems with sleep, talk to your doctor. Limit your intake of alcohol. If necessary, use a cane or walker to help maintain your balance. Wear supportive, rubber-soled shoes, even at home. If you live in a region that gets wintry weather, you may want to put special cleats on your shoes to prevent you from slipping on the snow and ice. Exercise regularly to help maintain muscle tone, agility, and balance. Always hold the banister when going up or down stairs. Also, use  bars when getting in or out of the bath or shower, or using the toilet. To avoid dizziness, get up slowly from a lying down position. Sit up first, dangling your legs for a minute or two before rising to a standing position. Overall Home Safety Check   According to the Consumer Product Safety Commision's \"Older Consumer Home Safety Checklist,\" it is important to check for potential hazards in each room. And remember, proper lighting is an essential factor in home safety. If you cannot see clearly, you are more likely to fall. Important questions to ask yourself include:   Are lamp, electric, extension, and telephone cords placed out of the flow of traffic and maintained in good condition? Have frayed cords been replaced? Are all small rugs and runners slip resistant? If not, you can secure them to the floor with a special double-sided carpet tape. Are smoke detectors properly locatedone on every floor of your home and one outside of every sleeping area? Are they in good working order? Are batteries replaced at least once a year? Do you have a well-maintained carbon monoxide detector outside every sleeping are in your home? Does your furniture layout leave plenty of space to maneuver between and around chairs, tables, beds, and sofas?    Are hallways, stairs and passages between rooms well lit? Can you reach a lamp without getting out of bed? Are floor surfaces well maintained? Shag rugs, high-pile carpeting, tile floors, and polished wood floors can be particularly slippery. Stairs should always have handrails and be carpeted or fitted with a non-skid tread. Is your telephone easily reachable. Is the cord safely tucked away? Room by Room   According to the Association of Aging, bathrooms and gail are the two most potentially hazardous rooms in your home. In the Kitchen    Be sure your stove is in proper working order and always make sure burners and the oven are off before you go out or go to sleep. Keep pots on the back burners, turn handles away from the front of the stove, and keep stove clean and free of grease build-up. Kitchen ventilation systems and range exhausts should be working properly. Keep flammable objects such as towels and pot holders away from the cooking area except when in use. Make sure kitchen curtains are tied back. Move cords and appliances away from the sink and hot surfaces. If extension cords are needed, install wiring guides so they do not hang over the sink, range, or working areas. Look for coffee pots, kettles and toaster ovens with automatic shut-offs. Keep a mop handy in the kitchen so you can wipe up spills instantly. You should also have a small fire extinguisher. Arrange your kitchen with frequently used items on lower shelves to avoid the need to stand on a stepstool to reach them. Make sure countertops are well-lit to avoid injuries while cutting and preparing food. In the Bathroom    Use a non-slip mat or decals in the tub and shower, since wet, soapy tile or porcelain surfaces are extremely slippery. Make sure bathroom rugs are non-skid or tape them firmly to the floor. Bathtubs should have at least one, preferably two, grab bars, firmly attached to structural supports in the wall.  (Do you get better and can speak for yourself again, you can accept or refuse any treatment. It doesn't matter what you said in your living will. Some states may limit your right to refuse treatment in certain cases. For example, you may need to clearly state in your living will that you don't want artificial hydration and nutrition, such as being fed through a tube. Is a living will a legal document? A living will is a legal document. Each state has its own laws about living [place]. And a living will may be called something else in your state. Here are some things to know about living [place]. You don't need an  to complete a living will. But legal advice can be helpful if your state's laws are unclear. It can also help if your health history is complicated or your family can't agree on what should be in your living will. You can change your living will at any time. Some people find that their wishes about end-of-life care change as their health changes. If you make big changes to your living will, complete a new form. If you move to another state, make sure that your living will is legal in the state where you now live. In most cases, doctors will respect your wishes even if you have a form from a different state. You might use a universal form that has been approved by many states. This kind of form can sometimes be filled out and stored online. Your digital copy will then be available wherever you have a connection to the internet. The doctors and nurses who need to treat you can find it right away. Your state may offer an online registry. This is another place where you can store your living will online. It's a good idea to get your living will notarized. This means using a person called a  to watch two people sign, or witness, your living will. What should you know when you create a living will?   Here are some questions to ask yourself as you make your living will:  Do you know enough about life support methods that might be used? If not, talk to your doctor so you know what might be done if you can't breathe on your own, your heart stops, or you can't swallow. What things would you still want to be able to do after you receive life-support methods? Would you want to be able to walk? To speak? To eat on your own? To live without the help of machines? Do you want certain Roman Catholic practices performed if you become very ill? If you have a choice, where do you want to be cared for? In your home? At a hospital or nursing home? If you have a choice at the end of your life, where would you prefer to die? At home? In a hospital or nursing home? Somewhere else? Would you prefer to be buried or cremated? Do you want your organs to be donated after you die? What should you do with your living will? Make sure that your family members and your health care agent have copies of your living will (also called a declaration). Give your doctor a copy of your living will. Ask him or her to keep it as part of your medical record. If you have more than one doctor, make sure that each one has a copy. Put a copy of your living will where it can be easily found. For example, some people may put a copy on their refrigerator door. If you are using a digital copy, be sure your doctor, family members, and health care agent know how to find and access it. Where can you learn more? Go to [email or web address] org and sign in to your Silk account. Enter H341 in the [facility] box to learn more about \"Learning About Living [place]. \"     If you do not have an account, please click on the \"Sign Up Now\" link. Current as of: July 17, 2020               Content Version: 12.8  © 6135-8070 Healthwise, Incorporated. Care instructions adapted under license by [facility] ([facility]).  If you have questions about a medical condition or this instruction, always ask your healthcare professional. Brian Mohr,

## 2021-06-15 ENCOUNTER — PATIENT MESSAGE (OUTPATIENT)
Dept: FAMILY MEDICINE CLINIC | Age: 66
End: 2021-06-15

## 2021-06-15 DIAGNOSIS — E11.65 TYPE 2 DIABETES MELLITUS WITH HYPERGLYCEMIA, WITHOUT LONG-TERM CURRENT USE OF INSULIN (HCC): Primary | ICD-10-CM

## 2021-06-15 NOTE — TELEPHONE ENCOUNTER
From: Kelly Morales  To: Abigail Staton MD  Sent: 6/15/2021 2:54 PM EDT  Subject: Non-Urgent Medical Question    Hi, I would like to go to [facility] for medical nutrition therapy. Can you please send them an order for me.  Thanks

## 2021-06-30 ENCOUNTER — HOSPITAL ENCOUNTER (OUTPATIENT)
Dept: DIABETES SERVICES | Age: 66
Setting detail: THERAPIES SERIES
Discharge: HOME | End: 2021-06-30
Payer: MEDICARE

## 2021-06-30 VITALS — BODY MASS INDEX: 41.65 KG/M2 | HEIGHT: 65 IN | WEIGHT: 250 LBS

## 2021-06-30 PROCEDURE — 97802 MEDICAL NUTRITION INDIV IN: CPT

## 2021-06-30 ASSESSMENT — PROBLEM AREAS IN DIABETES QUESTIONNAIRE (PAID)
COPING WITH COMPLICATIONS OF DIABETES: 1
FEELING DEPRESSED WHEN YOU THINK ABOUT LIVING WITH DIABETES: 1
FEELING SCARED WHEN YOU THINK ABOUT LIVING WITH DIABETES: 1
WORRYING ABOUT THE FUTURE AND THE POSSIBILITY OF SERIOUS COMPLICATIONS: 1
FEELING THAT DIABETES IS TAKING UP TOO MUCH OF YOUR MENTAL AND PHYSICAL ENERGY EVERY DAY: 1
PAID-5 TOTAL SCORE: 5

## 2021-06-30 NOTE — LETTER
800 11  - Diabetes Education    2021     Re:     Geovanna Elder  :  1955    Dear Dr. Alex Niño: Thank you for referring your patient, Geovanna Elder, to Diabetes Education Medical Nutrition Therapy. Your patient was here on 21, and the following were addressed:        [x] Nutrition as Related to Diabetes    [x] Carbohydrate Counting     [x] Free Food List    [x] Combination Foods    [x] Fast Foods    [x] Weighing and measuring    [x] Meal Planning    [x] Using Food Labels - Label Reading  [] Diabetes Education Class Schedule    [x] Diet Instruction       [x]  Diet instructed provided on: 1400 calorie ADA    Upon completion of this session, the following evaluation of your patients progress was made:    ASSESSMENT:  [x]  verbalizes understanding of diet principles  [x]  understands the relationship between diet and health  [x]  identifies proper food choices  [x]  identifies needed diet changes  [x]  expresses intentions to comply with diet guidelines  [x]  able to provide correct answers when asked    GOAL:  [x]  to follow individual meal plan  [x]  to weigh and measure food portions  [x]  to call with further questions  []  to attend diabetes education class sessions 1 and/or 2    PATIENT EDUCATION MATERIALS PROVIDED:  [x]  plate carb counting meal plan  []  low fat guidelines  [x]  carb counting sheet  []  low sodium guidelines  []  Other:      COMMENTS:  21KC: Instructed pt on 1400 calories with 10 total CHO choices; 2 cho choices at breakfast, 1 cho choice at AM snack, 3 cho choices at lunch, 3 cho choices at dinner, and 1 cho choice at HS. Per dietary recall pt was consuming simplified carbohydrates and has a diet high in processed foods. Patient stated she was in denial about needing to eat a diabetic diet for a long time but now is ready to make some changes. Reviewed higher fiber intakes.  Discussed different types of carbohydrates and how they effect the blood glucose levels. Discussed low fat and lean proteins. Patient is contemplative about making changes with her fat and protein choices at this time therefore appointment primarily focused on carbohydrate portions and counting. Discussed techniques to follow when pt eats out and goes to her dinner parties. Utilized plate method for some instruction so she can follow a more visual method. Instructed pt to call for additional education for low fat/weight loss when she feels like she understands the carbohydrate portion of meal planning. Pt did well with carbohydrate portioning concepts. Instructed pt to measure out portions and to follow meal plan guide. Patient is encouraged to call with further questions or call and re-schedule other sessions within a year from original date. Thank you for referring this patient to our program.  Please do not hesitate to call if you have any questions at ( (YI or JORDYN) or [phone] ([address] [place] [address]).         Sincerely,         Registered Dietitian Nutritionists:          []  AISHA Moore          [x]   Larry MANCIA   []  AISHA Santana  []   AISHA Garcia           Diabetes

## 2021-06-30 NOTE — PROGRESS NOTES
-Identify severe weather/situation crisis  & diabetes supplies management  []      Using medications safely:   -State effects of diabetes medicines on blood glucose levels;  -List diabetes medication taken, action & side effects 1 [x] All     []  []  3 6/30/21KC: Metformin   Insulin/Injectables/glucagon  -Name appropriate injection sites; proper storage; supplies needed;     []       Demonstrate proper technique  []      Monitoring blood glucose, interpreting and using results:   -Identify the purpose of testing   -Identify recommended & personal blood glucose targets & HgbA1C target levels  -State the Importance of logging blood glucose levels for pattern recognition;   -State benefits of reading/using pt generated health data  -Verbalize safe lancet disposal 1 [] All     []  []    []  []  []      -Demonstrate proper testing technique  []      Incorporating physical activity into lifestyle:   -State effect of exercise on blood glucose levels;   -State benefits of regular exercise;   -Define safety considerations/food choices if needed.  -Describe contraindications/maintenance of activity. 1 [] All     []  []    []  []      Incorporating nutritional management into lifestyle:   -Describe effect of type, amount & timing of food on blood glucose  -Describe methods for preparing and planning healthy meals  -Correctly read food labels  -Name 3 foods high in Carbohydrate 1 [x] All       []    []    []  []  3 6/30/21KC: Instructed pt on 1400 calories with 10 total CHO choices; 2 cho choices at breakfast, 1 cho choice at AM snack, 3 cho choices at lunch, 3 cho choices at dinner, and 1 cho choice at HS. Per dietary recall pt was consuming simplified carbohydrates and has a diet high in processed foods. Patient stated she was in denial about needing to eat a diabetic diet for a long time but now is ready to make some changes. Reviewed higher fiber intakes.  Discussed different types of carbohydrates and how they effect the blood glucose levels. Discussed low fat and lean proteins. Patient is contemplative about making changes with her fat and protein choices at this time therefore appointment primarily focused on carbohydrate portions and counting. Discussed techniques to follow when pt eats out and goes to her dinner parties. Utilized plate method for some instruction so she can follow a more visual method. Instructed pt to call for additional education for low fat/weight loss when she feels like she understands the carbohydrate portion of meal planning. Pt did well with carbohydrate portioning concepts. Instructed pt to measure out portions and to follow meal plan guide.    -Plan a carbohydrate-controlled meal based on individualized meal plan  -Demonstrate CHO counting/portion control   []  []      Developing strategies for problem solving to promote health/change behavior. -Identify 7 self-care behaviors; Personal health risk factors; Benefits, challenges & strategies for behavioral change and set an individualized goal selection. 1       []     []Nutrition  []Monitoring  []Exercise  []Medication  []Other     Identified Barriers to learning/adherence to self management plan:    None  []  other    Instruction Method:  Lecture/Discussion and Handouts    Supporting Education Materials/Equipment Provided: Meal Plan and Nutritional Packet   []Sinhala materials       [] services     []Other:      Encounter Type Date Attended Start Time End Time Comments No Show Dates   Assessment          Session 1         Session 2        1:1 DSMES          In person Follow-up         Gestational Diabetes         Individual MNT 6/30/21KC 0900 1000 In person    Meter Instrx        Insulin Instrx           Additional Comments: [] Pt seen individually due to Covid-19 Safety precautions and no group session available.         Date:   Follow-up goal attainment based on patients initial DSMES goal    Dr Notified by [] EMR []Fax        []Post class Hgb A1C  []Medication compliance   []Plate method/meal plan compliance   []Able to state the number of Carbohydrate servings eaten at B,L,D   []Testing blood glucose as prescribed by PCP   []Exercise Routine   []Other:   []Other:     []Patient lost to follow-up  Dr Notified by []EMR []Fax     Personal Support Plan:      [] Keep all scheduled doctor appointments   [] Make and keep appointments with specialists (foot, eye, dentist) as recommended   [] Consult my pharmacist about all new medications or to ask any medication questions   [] Get tested for sleep apnea   [] Seek help for:   [] Make an appointment with:   [] Attend smoking cessation classes or call 1-800-QUIT-NOW  [] Attend Diabetes Support Group   [] Use diabetes magazines, books, or credible web-sites like the ADA for more information  [] Increase exercise at home or join an exercise program:   [] Other:

## 2021-08-12 ENCOUNTER — PATIENT MESSAGE (OUTPATIENT)
Dept: FAMILY MEDICINE CLINIC | Age: 66
End: 2021-08-12

## 2021-08-12 ENCOUNTER — OFFICE VISIT (OUTPATIENT)
Dept: FAMILY MEDICINE CLINIC | Age: 66
End: 2021-08-12
Payer: MEDICARE

## 2021-08-12 VITALS
WEIGHT: 240 LBS | SYSTOLIC BLOOD PRESSURE: 138 MMHG | HEART RATE: 83 BPM | BODY MASS INDEX: 39.99 KG/M2 | DIASTOLIC BLOOD PRESSURE: 78 MMHG | RESPIRATION RATE: 20 BRPM | HEIGHT: 65 IN | OXYGEN SATURATION: 96 %

## 2021-08-12 DIAGNOSIS — E11.65 TYPE 2 DIABETES MELLITUS WITH HYPERGLYCEMIA, WITHOUT LONG-TERM CURRENT USE OF INSULIN (HCC): Primary | ICD-10-CM

## 2021-08-12 DIAGNOSIS — L71.9 ROSACEA: ICD-10-CM

## 2021-08-12 DIAGNOSIS — E03.9 HYPOTHYROIDISM (ACQUIRED): ICD-10-CM

## 2021-08-12 LAB — HBA1C MFR BLD: 6.9 %

## 2021-08-12 PROCEDURE — 83036 HEMOGLOBIN GLYCOSYLATED A1C: CPT | Performed by: FAMILY MEDICINE

## 2021-08-12 PROCEDURE — G8400 PT W/DXA NO RESULTS DOC: HCPCS | Performed by: FAMILY MEDICINE

## 2021-08-12 PROCEDURE — 1090F PRES/ABSN URINE INCON ASSESS: CPT | Performed by: FAMILY MEDICINE

## 2021-08-12 PROCEDURE — 3044F HG A1C LEVEL LT 7.0%: CPT | Performed by: FAMILY MEDICINE

## 2021-08-12 PROCEDURE — 99214 OFFICE O/P EST MOD 30 MIN: CPT | Performed by: FAMILY MEDICINE

## 2021-08-12 PROCEDURE — G8417 CALC BMI ABV UP PARAM F/U: HCPCS | Performed by: FAMILY MEDICINE

## 2021-08-12 PROCEDURE — 1036F TOBACCO NON-USER: CPT | Performed by: FAMILY MEDICINE

## 2021-08-12 PROCEDURE — G8427 DOCREV CUR MEDS BY ELIG CLIN: HCPCS | Performed by: FAMILY MEDICINE

## 2021-08-12 PROCEDURE — 4040F PNEUMOC VAC/ADMIN/RCVD: CPT | Performed by: FAMILY MEDICINE

## 2021-08-12 PROCEDURE — 2022F DILAT RTA XM EVC RTNOPTHY: CPT | Performed by: FAMILY MEDICINE

## 2021-08-12 PROCEDURE — 3017F COLORECTAL CA SCREEN DOC REV: CPT | Performed by: FAMILY MEDICINE

## 2021-08-12 PROCEDURE — 1123F ACP DISCUSS/DSCN MKR DOCD: CPT | Performed by: FAMILY MEDICINE

## 2021-08-12 RX ORDER — LEVOTHYROXINE SODIUM 175 UG/1
175 TABLET ORAL DAILY
Qty: 90 TABLET | Refills: 1 | Status: SHIPPED
Start: 2021-08-12 | End: 2021-11-24 | Stop reason: SDUPTHER

## 2021-08-12 RX ORDER — ATORVASTATIN CALCIUM 20 MG/1
TABLET, FILM COATED ORAL
Qty: 90 TABLET | Refills: 3 | Status: SHIPPED
Start: 2021-08-12 | End: 2022-05-26 | Stop reason: SDUPTHER

## 2021-08-12 RX ORDER — KETOCONAZOLE 20 MG/G
CREAM TOPICAL
Qty: 60 G | Refills: 5 | Status: SHIPPED | OUTPATIENT
Start: 2021-08-12

## 2021-08-12 RX ORDER — LANCETS 28 GAUGE
EACH MISCELLANEOUS
Qty: 150 EACH | Refills: 3 | Status: SHIPPED
Start: 2021-08-12 | End: 2021-08-16

## 2021-08-12 RX ORDER — METRONIDAZOLE 7.5 MG/G
LOTION TOPICAL
Qty: 3 BOTTLE | Refills: 3 | Status: SHIPPED | OUTPATIENT
Start: 2021-08-12

## 2021-08-12 RX ORDER — TRIAMCINOLONE ACETONIDE 1 MG/G
CREAM TOPICAL
Qty: 1200 G | Refills: 3 | Status: SHIPPED | OUTPATIENT
Start: 2021-08-12

## 2021-08-12 RX ORDER — LEVOTHYROXINE SODIUM 0.2 MG/1
TABLET ORAL
Qty: 132 TABLET | Refills: 1 | Status: SHIPPED
Start: 2021-08-12 | End: 2021-11-19

## 2021-08-12 RX ORDER — ALLOPURINOL 100 MG/1
100 TABLET ORAL DAILY
Qty: 90 TABLET | Refills: 1 | Status: SHIPPED
Start: 2021-08-12 | End: 2022-05-17

## 2021-08-12 SDOH — ECONOMIC STABILITY: FOOD INSECURITY: WITHIN THE PAST 12 MONTHS, YOU WORRIED THAT YOUR FOOD WOULD RUN OUT BEFORE YOU GOT MONEY TO BUY MORE.: NEVER TRUE

## 2021-08-12 SDOH — ECONOMIC STABILITY: FOOD INSECURITY: WITHIN THE PAST 12 MONTHS, THE FOOD YOU BOUGHT JUST DIDN'T LAST AND YOU DIDN'T HAVE MONEY TO GET MORE.: NEVER TRUE

## 2021-08-12 ASSESSMENT — ENCOUNTER SYMPTOMS
VOMITING: 0
NAUSEA: 0
DIARRHEA: 0
SHORTNESS OF BREATH: 0

## 2021-08-12 ASSESSMENT — LIFESTYLE VARIABLES: HOW OFTEN DO YOU HAVE A DRINK CONTAINING ALCOHOL: NEVER

## 2021-08-12 NOTE — PROGRESS NOTES
OFFICE PROGRESS NOTE      SUBJECTIVE:        Patient ID:   Roxanna Rey is a 77 y.o. female whopresents for   Chief Complaint   Patient presents with    Diabetes     needs lancets for free style ,lab results           HPI:   Patient is here to follow up on diabetes. Fasting blood sugars:120's Midday blood sugars: not checking. Patient checks blood glucose 1 times per day. Patient is following diabetic diet. Patient is a nonsmoker. Last ophthalmology visit: 2019. Patient is taking a daily statin. Recent lab results reviewed including CMP, CBC, TSH, and lipid panel which is remarkable for low TSH and low white count. Last urine microalbumin: 2/2021      Prior to Admission medications    Medication Sig Start Date End Date Taking? Authorizing Provider   metFORMIN (GLUCOPHAGE) 1000 MG tablet Take 1 tablet by mouth 2 times daily (with meals) 8/12/21  Yes Steffany Collins MD   atorvastatin (LIPITOR) 20 MG tablet TAKE 1 TABLET DAILY 8/12/21  Yes Steffany Collins MD   allopurinol (ZYLOPRIM) 100 MG tablet Take 1 tablet by mouth daily 8/12/21  Yes Steffany Collins MD   ketoconazole (NIZORAL) 2 % cream Apply topically daily.  8/12/21  Yes Steffany Collins MD   metroNIDAZOLE, TOPICAL, 0.75 % LOTN Apply to face BID 8/12/21  Yes Steffany Collins MD   triamcinolone (KENALOG) 0.1 % cream Apply bid to affected areas prn 8/12/21  Yes Steffany Collins MD   levothyroxine (SYNTHROID) 200 MCG tablet 1 tablet daily 8/12/21  Yes Steffany Collins MD   levothyroxine (SYNTHROID) 175 MCG tablet Take 1 tablet by mouth daily Monday-Thursday 8/12/21  Yes MD Daya Hinson [facility] Check blood sugar qam 8/12/21  Yes Steffany Collins MD   indomethacin (INDOCIN SR) 75 MG extended release capsule Take 1 capsule by mouth 2 times daily (with meals) 5/11/21  Yes Steffany Collins MD   losartan-hydroCHLOROthiazide [facility]) 100-25  Marital Status:    Intimate Partner Violence:     Fear of Current or Ex-Partner:     Emotionally Abused:     Physically Abused:     Sexually Abused:        I have reviewed Gilma's allergies, medications, problem list, medical, social and family history and have updated as needed in the electronic medical record    Current Outpatient Medications   Medication Sig Dispense Refill    metFORMIN (GLUCOPHAGE) 1000 MG tablet Take 1 tablet by mouth 2 times daily (with meals) 180 tablet 1    atorvastatin (LIPITOR) 20 MG tablet TAKE 1 TABLET DAILY 90 tablet 3    allopurinol (ZYLOPRIM) 100 MG tablet Take 1 tablet by mouth daily 90 tablet 1    ketoconazole (NIZORAL) 2 % cream Apply topically daily. 60 g 5    metroNIDAZOLE, TOPICAL, 0.75 % LOTN Apply to face BID 3 Bottle 3    triamcinolone (KENALOG) 0.1 % cream Apply bid to affected areas prn 1200 g 3    levothyroxine (SYNTHROID) 200 MCG tablet 1 tablet daily 132 tablet 1    levothyroxine (SYNTHROID) 175 MCG tablet Take 1 tablet by mouth daily Monday-Thursday 90 tablet 1    FreeStyle Lancets MISC Check blood sugar qam 150 each 3    indomethacin (INDOCIN SR) 75 MG extended release capsule Take 1 capsule by mouth 2 times daily (with meals) 180 capsule 3    losartan-hydroCHLOROthiazide (HYZAAR) 100-25 MG per tablet Take 1 tablet by mouth daily 90 tablet 1    blood glucose test strips (FREESTYLE LITE) strip Check blood sugar qam 150 each 3    Blood Glucose Monitoring Suppl (FREESTYLE LITE) LAYNE Check blood sugar qam 1 Device 0    vitamin D (ERGOCALCIFEROL) 07486 units CAPS capsule Take 1 capsule by mouth once a week 12 capsule 1    Handicap Placard MISC by Does not apply route Unable to ambulate more than 60 feet without difficulty  Expires 10/31/2022 1 each 0     No current facility-administered medications for this visit. Review Of Systems:    Review of Systems   Eyes: Positive for visual disturbance (blurry vision comes and goes).    Respiratory: Negative for shortness of breath. Cardiovascular: Negative for chest pain, palpitations and leg swelling. Gastrointestinal: Negative for diarrhea, nausea and vomiting. Genitourinary: Negative for difficulty urinating, dysuria and frequency. Skin: Negative for rash. Psychiatric/Behavioral: Negative for dysphoric mood. OBJECTIVE:     VS:  Wt Readings from Last 3 Encounters:   08/12/21 240 lb (108.9 kg)   06/30/21 250 lb (113.4 kg)   05/11/21 250 lb (113.4 kg)     Vitals:    08/12/21 0818   BP: 138/78   Pulse: 83   Resp: 20   SpO2: 96%       Physical Exam  Vitals reviewed. Constitutional:       General: She is not in acute distress. Appearance: She is well-developed. Neck:      Vascular: No carotid bruit. Cardiovascular:      Rate and Rhythm: Normal rate and regular rhythm. Heart sounds: Normal heart sounds. No murmur heard. No gallop. Pulmonary:      Effort: Pulmonary effort is normal.      Breath sounds: Normal breath sounds. No wheezing or rales. Abdominal:      General: Bowel sounds are normal. There is no distension. Palpations: Abdomen is soft. Tenderness: There is no abdominal tenderness. Musculoskeletal:      Cervical back: Neck supple. Right lower leg: No edema. Left lower leg: No edema. Skin:     General: Skin is warm and dry. Neurological:      Mental Status: She is alert and oriented to person, place, and time.            Lab Results   Component Value Date    LABA1C 6.9 08/12/2021     No results found for: EAG   Lab Results   Component Value Date    WBC 2.8 (L) 08/09/2021    HGB 11.7 08/09/2021    HCT 39.7 08/09/2021    MCV 80.9 08/09/2021     08/09/2021      Lab Results   Component Value Date     08/09/2021    K 4.6 08/09/2021     08/09/2021    CO2 23 08/09/2021    BUN 22 08/09/2021    CREATININE 0.7 08/09/2021    GLUCOSE 136 (H) 08/09/2021    CALCIUM 10.0 08/09/2021    PROT 7.8 08/09/2021    LABALBU 4.0 08/09/2021 BILITOT 0.4 08/09/2021    ALKPHOS 212 (H) 08/09/2021    AST 33 (H) 08/09/2021    ALT 38 (H) 08/09/2021    LABGLOM >60 08/09/2021    GFRAA >60 08/09/2021        Lab Results   Component Value Date    TSH 0.113 (L) 08/09/2021      Lab Results   Component Value Date    CHOL 129 08/09/2021     Lab Results   Component Value Date    TRIG 176 (H) 08/09/2021     Lab Results   Component Value Date    HDL 34 08/09/2021     Lab Results   Component Value Date    LDLCALC 60 08/09/2021     Lab Results   Component Value Date    LABVLDL 35 08/09/2021    VLDL 33 11/19/2018     No results found for: Merton Bloch was seen today for diabetes. Diagnoses and all orders for this visit:    Type 2 diabetes mellitus with hyperglycemia, without long-term current use of insulin (HCC)  -     metFORMIN (GLUCOPHAGE) 1000 MG tablet; Take 1 tablet by mouth 2 times daily (with meals)  -     atorvastatin (LIPITOR) 20 MG tablet; TAKE 1 TABLET DAILY  -     POCT glycosylated hemoglobin (Hb A1C)    Hypothyroidism (acquired)  -     levothyroxine (SYNTHROID) 200 MCG tablet; 1 tablet daily  -     levothyroxine (SYNTHROID) 175 MCG tablet; Take 1 tablet by mouth daily Monday-Thursday  -     TSH without Reflex; Future  -     FreeStyle Lancets MISC; Check blood sugar qam    Rosacea  -     metroNIDAZOLE, TOPICAL, 0.75 % LOTN; Apply to face BID    Other orders  -     allopurinol (ZYLOPRIM) 100 MG tablet; Take 1 tablet by mouth daily  -     ketoconazole (NIZORAL) 2 % cream; Apply topically daily. -     triamcinolone (KENALOG) 0.1 % cream; Apply bid to affected areas prn        BMI was elevated today, and weight loss plan recommended is : conventional weight loss. Phone/MyChart follow up if tests abnormal.    Return in about 3 months (around 11/12/2021) for diabetes. I have reviewed my findings and recommendations with Vickie Jimenez.     Milly Hugo MD, M.D

## 2021-08-12 NOTE — PATIENT INSTRUCTIONS
Patient Education        Learning About Carbohydrate (Carb) Counting and Eating Out When You Have Diabetes  Why plan your meals? Meal planning can be a key part of managing diabetes. Planning meals and snacks with the right balance of carbohydrate, protein, and fat can help you keep your blood sugar at the target level you set with your doctor. You don't have to eat special foods. You can eat what your family eats, including sweets once in a while. But you do have to pay attention to how often you eat and how much you eat of certain foods. You may want to work with a dietitian or a certified diabetes educator. He or she can give you tips and meal ideas and can answer your questions about meal planning. This health professional can also help you reach a healthy weight if that is one of your goals. What should you know about eating carbs? Managing the amount of carbohydrate (carbs) you eat is an important part of healthy meals when you have diabetes. Carbohydrate is found in many foods. · Learn which foods have carbs. And learn the amounts of carbs in different foods. ? Bread, cereal, pasta, and rice have about 15 grams of carbs in a serving. A serving is 1 slice of bread (1 ounce), ½ cup of cooked cereal, or 1/3 cup of cooked pasta or rice. ? Fruits have 15 grams of carbs in a serving. A serving is 1 small fresh fruit, such as an apple or orange; ½ of a banana; ½ cup of cooked or canned fruit; ½ cup of fruit juice; 1 cup of melon or raspberries; or 2 tablespoons of dried fruit. ? Milk and no-sugar-added yogurt have 15 grams of carbs in a serving. A serving is 1 cup of milk or 2/3 cup of no-sugar-added yogurt. ? Starchy vegetables have 15 grams of carbs in a serving. A serving is ½ cup of mashed potatoes or sweet potato; 1 cup winter squash; ½ of a small baked potato; ½ cup of cooked beans; or ½ cup cooked corn or green peas.   · Learn how much carbs to eat each day and at each meal. A dietitian or CDE can teach you how to keep track of the amount of carbs you eat. This is called carbohydrate counting. · If you are not sure how to count carbohydrate grams, use the Plate Method to plan meals. It is a good, quick way to make sure that you have a balanced meal. It also helps you spread carbs throughout the day. ? Divide your plate by types of foods. Put non-starchy vegetables on half the plate, meat or other protein food on one-quarter of the plate, and a grain or starchy vegetable in the final quarter of the plate. To this you can add a small piece of fruit and 1 cup of milk or yogurt, depending on how many carbs you are supposed to eat at a meal.  · Try to eat about the same amount of carbs at each meal. Do not \"save up\" your daily allowance of carbs to eat at one meal.  · Proteins have very little or no carbs per serving. Examples of proteins are beef, chicken, turkey, fish, eggs, tofu, cheese, cottage cheese, and peanut butter. A serving size of meat is 3 ounces, which is about the size of a deck of cards. Examples of meat substitute serving sizes (equal to 1 ounce of meat) are 1/4 cup of cottage cheese, 1 egg, 1 tablespoon of peanut butter, and ½ cup of tofu. How can you eat out and still eat healthy? · Learn to estimate the serving sizes of foods that have carbohydrate. If you measure food at home, it will be easier to estimate the amount in a serving of restaurant food. · If the meal you order has too much carbohydrate (such as potatoes, corn, or baked beans), ask to have a low-carbohydrate food instead. Ask for a salad or green vegetables. · If you use insulin, check your blood sugar before and after eating out to help you plan how much to eat in the future. · If you eat more carbohydrate at a meal than you had planned, take a walk or do other exercise. This will help lower your blood sugar. What are some tips for eating healthy? · Limit saturated fat, such as the fat from meat and dairy products. This is a healthy choice because people who have diabetes are at higher risk of heart disease. So choose lean cuts of meat and nonfat or low-fat dairy products. Use olive or canola oil instead of butter or shortening when cooking. · Don't skip meals. Your blood sugar may drop too low if you skip meals and take insulin or certain medicines for diabetes. · Check with your doctor before you drink alcohol. Alcohol can cause your blood sugar to drop too low. Alcohol can also cause a bad reaction if you take certain diabetes medicines. Follow-up care is a key part of your treatment and safety. Be sure to make and go to all appointments, and call your doctor if you are having problems. It's also a good idea to know your test results and keep a list of the medicines you take. Where can you learn more? Go to [email or web address].[email or web address].ai. org and sign in to your [employer] account. Enter V591 in the Dumbstruck box to learn more about \"Learning About Carbohydrate (Carb) Counting and Eating Out When You Have Diabetes. \"     If you do not have an account, please click on the \"Sign Up Now\" link. Current as of: August 31, 2020               Content Version: 12.9  © 2006-2021 Healthwise[employer] Inc[employer]. Care instructions adapted under license by [facility] ([facility]). If you have questions about a medical condition or this instruction, always ask your healthcare professional. [place] [address] any warranty or liability for your use of this information.

## 2021-08-12 NOTE — TELEPHONE ENCOUNTER
From: Astrid Poon  To: Eugenia Harrison MD  Sent: 8/12/2021 12:18 PM EDT  Subject: Prescription Question    Hi, I called the pharmacy about the Free Style lancets. They are not covered by my insurance. They said One Touch is covered. That means I will need all new equipment. Always a hassle. .lol. Can you change everything over to One Touch . Thanks.

## 2021-08-15 PROBLEM — L71.9 ROSACEA: Status: ACTIVE | Noted: 2021-08-15

## 2021-08-16 RX ORDER — BLOOD-GLUCOSE METER
EACH MISCELLANEOUS
Qty: 1 KIT | Refills: 0 | Status: SHIPPED | OUTPATIENT
Start: 2021-08-16

## 2021-08-16 RX ORDER — BLOOD SUGAR DIAGNOSTIC
STRIP MISCELLANEOUS
Qty: 150 EACH | Refills: 3 | Status: SHIPPED | OUTPATIENT
Start: 2021-08-16

## 2021-08-16 RX ORDER — LANCETS
EACH MISCELLANEOUS
Qty: 150 EACH | Refills: 3 | Status: SHIPPED | OUTPATIENT
Start: 2021-08-16

## 2021-08-19 ENCOUNTER — PATIENT MESSAGE (OUTPATIENT)
Dept: FAMILY MEDICINE CLINIC | Age: 66
End: 2021-08-19

## 2021-08-20 NOTE — TELEPHONE ENCOUNTER
Pt called to f/u on form since she has not heard from ofc. Informed pt Dr. Salvatore De La Vega and MA are not in ofc today and I will call Pharmacy requesting [address] form to be faxed to us. Called pharmacy and requested DWO form to be faxed.

## 2021-08-20 NOTE — TELEPHONE ENCOUNTER
From: Roxanna Rey  To: Steffany Collins MD  Sent: 8/19/2021 8:55 AM EDT  Subject: Prescription Question    Hi, can someone please help me. This is the fourth email I have sent about this, and I have not gotten a response. Since I am on Medicare now, the pharmacist at [facility] on [address] needs Dr. Eduardo Ojeda to send a Medicare [address] form in order for me to get my diabetic supplies. I can not get my diabetic supplies till they get that form. It will not be covered. I am out of some supplies and have no way to check my blood sugar. Can you please take care of this for me. If there is a problem, can someone please call me at [phone] and let me know. This has been very frustrating. Thank you.

## 2021-11-15 DIAGNOSIS — E03.9 HYPOTHYROIDISM (ACQUIRED): ICD-10-CM

## 2021-11-19 RX ORDER — LEVOTHYROXINE SODIUM 0.2 MG/1
TABLET ORAL
Qty: 132 TABLET | Refills: 1 | Status: SHIPPED
Start: 2021-11-19 | End: 2022-05-10

## 2021-11-23 DIAGNOSIS — E03.9 HYPOTHYROIDISM (ACQUIRED): ICD-10-CM

## 2021-11-23 LAB — TSH SERPL DL<=0.05 MIU/L-ACNC: 0.03 UIU/ML (ref 0.27–4.2)

## 2021-11-24 ENCOUNTER — OFFICE VISIT (OUTPATIENT)
Dept: FAMILY MEDICINE CLINIC | Age: 66
End: 2021-11-24
Payer: MEDICARE

## 2021-11-24 VITALS
OXYGEN SATURATION: 97 % | BODY MASS INDEX: 38.15 KG/M2 | RESPIRATION RATE: 20 BRPM | WEIGHT: 229 LBS | HEART RATE: 77 BPM | HEIGHT: 65 IN | SYSTOLIC BLOOD PRESSURE: 138 MMHG | DIASTOLIC BLOOD PRESSURE: 82 MMHG

## 2021-11-24 DIAGNOSIS — Z23 NEEDS FLU SHOT: ICD-10-CM

## 2021-11-24 DIAGNOSIS — E11.65 TYPE 2 DIABETES MELLITUS WITH HYPERGLYCEMIA, WITHOUT LONG-TERM CURRENT USE OF INSULIN (HCC): Primary | ICD-10-CM

## 2021-11-24 DIAGNOSIS — I10 ESSENTIAL HYPERTENSION: ICD-10-CM

## 2021-11-24 DIAGNOSIS — E03.9 HYPOTHYROIDISM (ACQUIRED): ICD-10-CM

## 2021-11-24 LAB — HBA1C MFR BLD: 6.5 %

## 2021-11-24 PROCEDURE — G8417 CALC BMI ABV UP PARAM F/U: HCPCS | Performed by: FAMILY MEDICINE

## 2021-11-24 PROCEDURE — 2022F DILAT RTA XM EVC RTNOPTHY: CPT | Performed by: FAMILY MEDICINE

## 2021-11-24 PROCEDURE — G8484 FLU IMMUNIZE NO ADMIN: HCPCS | Performed by: FAMILY MEDICINE

## 2021-11-24 PROCEDURE — 1036F TOBACCO NON-USER: CPT | Performed by: FAMILY MEDICINE

## 2021-11-24 PROCEDURE — G0008 ADMIN INFLUENZA VIRUS VAC: HCPCS | Performed by: FAMILY MEDICINE

## 2021-11-24 PROCEDURE — 4040F PNEUMOC VAC/ADMIN/RCVD: CPT | Performed by: FAMILY MEDICINE

## 2021-11-24 PROCEDURE — 83036 HEMOGLOBIN GLYCOSYLATED A1C: CPT | Performed by: FAMILY MEDICINE

## 2021-11-24 PROCEDURE — 1123F ACP DISCUSS/DSCN MKR DOCD: CPT | Performed by: FAMILY MEDICINE

## 2021-11-24 PROCEDURE — 3044F HG A1C LEVEL LT 7.0%: CPT | Performed by: FAMILY MEDICINE

## 2021-11-24 PROCEDURE — 90694 VACC AIIV4 NO PRSRV 0.5ML IM: CPT | Performed by: FAMILY MEDICINE

## 2021-11-24 PROCEDURE — G8400 PT W/DXA NO RESULTS DOC: HCPCS | Performed by: FAMILY MEDICINE

## 2021-11-24 PROCEDURE — 1090F PRES/ABSN URINE INCON ASSESS: CPT | Performed by: FAMILY MEDICINE

## 2021-11-24 PROCEDURE — 3017F COLORECTAL CA SCREEN DOC REV: CPT | Performed by: FAMILY MEDICINE

## 2021-11-24 PROCEDURE — 99214 OFFICE O/P EST MOD 30 MIN: CPT | Performed by: FAMILY MEDICINE

## 2021-11-24 PROCEDURE — G8427 DOCREV CUR MEDS BY ELIG CLIN: HCPCS | Performed by: FAMILY MEDICINE

## 2021-11-24 RX ORDER — INDOMETHACIN 75 MG/1
75 CAPSULE, EXTENDED RELEASE ORAL 2 TIMES DAILY WITH MEALS
Qty: 180 CAPSULE | Refills: 3 | Status: SHIPPED
Start: 2021-11-24 | End: 2022-05-26 | Stop reason: SDUPTHER

## 2021-11-24 RX ORDER — LOSARTAN POTASSIUM AND HYDROCHLOROTHIAZIDE 25; 100 MG/1; MG/1
1 TABLET ORAL DAILY
Qty: 90 TABLET | Refills: 1 | Status: SHIPPED
Start: 2021-11-24 | End: 2022-05-17

## 2021-11-24 RX ORDER — LEVOTHYROXINE SODIUM 0.15 MG/1
150 TABLET ORAL DAILY
Qty: 90 TABLET | Refills: 1 | Status: SHIPPED
Start: 2021-11-24 | End: 2022-05-23 | Stop reason: DRUGHIGH

## 2021-11-24 ASSESSMENT — ENCOUNTER SYMPTOMS
SHORTNESS OF BREATH: 0
NAUSEA: 0
DIARRHEA: 0
VOMITING: 0

## 2021-11-24 NOTE — PATIENT INSTRUCTIONS

## 2021-11-24 NOTE — PROGRESS NOTES
OFFICE PROGRESS NOTE      SUBJECTIVE:        Patient ID:   Maryellen Turner is a 77 y.o. female who presents for   Chief Complaint   Patient presents with    Diabetes     lab results         HPI:   Patient is here to follow up on diabetes. Fasting blood sugars:117-125 Midday blood sugars: not checking. Patient checks blood glucose 1 times per day. Patient is following diabetic diet. Patient is a nonsmoker. Last ophthalmology visit: 11/2021 Dr. Debbie Hernandez. Patient is taking a daily statin. Recent lab results reviewed including CMP, CBC, TSH, and lipid panel which are remarkable for low TSH and hyperglycemia. Last urine microalbumin: 2/2021    Patient doing well with weight loss efforts. Lost 21 pounds in past 4 months with dietary. Prior to Admission medications    Medication Sig Start Date End Date Taking?  Authorizing Provider   losartan-hydroCHLOROthiazide (HYZAAR) 100-25 MG per tablet Take 1 tablet by mouth daily 11/24/21  Yes Christine Cole MD   indomethacin (INDOCIN SR) 75 MG extended release capsule Take 1 capsule by mouth 2 times daily (with meals) 11/24/21  Yes Christine Cole MD   levothyroxine (SYNTHROID) 150 MCG tablet Take 1 tablet by mouth daily Monday-Thursday 11/24/21  Yes Christine Cole MD   levothyroxine (SYNTHROID) 200 MCG tablet take 2 tablets by mouth once daily [address] then take 1 tablet once daily ON OTHER DAYS 11/19/21  Yes Christine Cole MD   Blood Glucose Monitoring Suppl (ONE TOUCH ULTRA 2) w/Device KIT Check blood sugar qam 8/16/21  Yes Christine Cole MD   ONE TOUCH ULTRASOFT LANCETS MISC Check blood sugar every morning 8/16/21  Yes Christine Cole MD   blood glucose test strips [facility] ULTRA) strip Check blood sugar every morning 8/16/21  Yes Christine Cole MD   metFORMIN (GLUCOPHAGE) 1000 MG tablet Take 1 tablet by mouth 2 times daily (with meals) 8/12/21  Yes Shyann Hernandez MD Diana   atorvastatin (LIPITOR) 20 MG tablet TAKE 1 TABLET DAILY 8/12/21  Yes Haley Buckley MD   allopurinol (ZYLOPRIM) 100 MG tablet Take 1 tablet by mouth daily 8/12/21  Yes Haley Bukcley MD   ketoconazole (NIZORAL) 2 % cream Apply topically daily. 8/12/21  Yes Haley Buckley MD   metroNIDAZOLE, TOPICAL, 0.75 % LOTN Apply to face BID 8/12/21  Yes Haley Buckley MD   triamcinolone (KENALOG) 0.1 % cream Apply bid to affected areas prn 8/12/21  Yes Haley Buckley MD   vitamin D (ERGOCALCIFEROL) 67395 units CAPS capsule Take 1 capsule by mouth once a week 10/3/18  Yes Haley Buckley MD   Handicap Placard MISC by Does not apply route Unable to ambulate more than 60 feet without difficulty  Expires 10/31/2022 10/25/17  Yes Haley Buckley MD     Social History     Socioeconomic History    Marital status:      Spouse name: None    Number of children: None    Years of education: None    Highest education level: None   Occupational History    None   Tobacco Use    Smoking status: Never Smoker    Smokeless tobacco: Never Used   Vaping Use    Vaping Use: Never used   Substance and Sexual Activity    Alcohol use: Yes     Comment: social    Drug use: No    Sexual activity: Not Currently   Other Topics Concern    None   Social History Narrative    None     Social Determinants of Health     Financial Resource Strain:     Difficulty of Paying Living Expenses: Not on file   Food Insecurity: No Food Insecurity    Worried About Running Out of Food in the Last Year: Never true    [place]mani of Food in the Last Year: Never true   Transportation Needs:     Lack of Transportation (Medical): Not on file    Lack of Transportation (Non-Medical):  Not on file   Physical Activity:     Days of Exercise per Week: Not on file    Minutes of Exercise per Session: Not on file   Stress: No Stress Concern Present    Feeling of Stress : Not at all   Social Connections:     Frequency of Communication with Friends and Family: Not on file    Frequency of Social Gatherings with Friends and Family: Not on file    Attends Buddhism Services: Not on file    Active Member of Clubs or Organizations: Not on file    Attends Club or Organization Meetings: Not on file    Marital Status: Not on file   Intimate Partner Violence:     Fear of Current or Ex-Partner: Not on file    Emotionally Abused: Not on file    Physically Abused: Not on file    Sexually Abused: Not on file   Housing Stability:     Unable to Pay for Housing in the Last Year: Not on file    Number of [place] in the Last Year: Not on file    Unstable Housing in the Last Year: Not on file       I have reviewed Gilma's allergies, medications, problem list, medical, social and family history and have updated as needed in the electronic medical record    Current Outpatient Medications   Medication Sig Dispense Refill    losartan-hydroCHLOROthiazide (HYZAAR) 100-25 MG per tablet Take 1 tablet by mouth daily 90 tablet 1    indomethacin (INDOCIN SR) 75 MG extended release capsule Take 1 capsule by mouth 2 times daily (with meals) 180 capsule 3    levothyroxine (SYNTHROID) 150 MCG tablet Take 1 tablet by mouth daily Monday-Thursday 90 tablet 1    levothyroxine (SYNTHROID) 200 MCG tablet take 2 tablets by mouth once daily [address] then take 1 tablet once daily ON OTHER DAYS 132 tablet 1    Blood Glucose Monitoring Suppl (ONE TOUCH ULTRA 2) w/Device KIT Check blood sugar qam 1 kit 0    ONE TOUCH ULTRASOFT LANCETS MISC Check blood sugar every morning 150 each 3    blood glucose test strips (ONETOUCH ULTRA) strip Check blood sugar every morning 150 each 3    metFORMIN (GLUCOPHAGE) 1000 MG tablet Take 1 tablet by mouth 2 times daily (with meals) 180 tablet 1    atorvastatin (LIPITOR) 20 MG tablet TAKE 1 TABLET DAILY 90 tablet 3    allopurinol (ZYLOPRIM) 100 MG tablet Take 1 tablet by mouth daily 90 tablet 1    ketoconazole (NIZORAL) 2 % cream Apply topically daily. 60 g 5    metroNIDAZOLE, TOPICAL, 0.75 % LOTN Apply to face BID 3 Bottle 3    triamcinolone (KENALOG) 0.1 % cream Apply bid to affected areas prn 1200 g 3    vitamin D (ERGOCALCIFEROL) 30597 units CAPS capsule Take 1 capsule by mouth once a week 12 capsule 1    Handicap Placard MISC by Does not apply route Unable to ambulate more than 60 feet without difficulty  Expires 10/31/2022 1 each 0     No current facility-administered medications for this visit. Review Of Systems:    Review of Systems   Eyes: Positive for visual disturbance (blurry vision in mornings). Respiratory: Negative for shortness of breath. Cardiovascular: Negative for chest pain, palpitations and leg swelling. Gastrointestinal: Negative for diarrhea, nausea and vomiting. Genitourinary: Negative for difficulty urinating, dysuria and frequency. Skin: Negative for rash. Psychiatric/Behavioral: Negative for dysphoric mood. OBJECTIVE:     VS:  Wt Readings from Last 3 Encounters:   11/24/21 229 lb (103.9 kg)   08/12/21 240 lb (108.9 kg)   06/30/21 250 lb (113.4 kg)     Vitals:    11/24/21 0818   BP: 138/82   Pulse: 77   Resp: 20   SpO2: 97%       Physical Exam  Vitals reviewed. Constitutional:       General: She is not in acute distress. Appearance: She is well-developed. Neck:      Vascular: No carotid bruit. Cardiovascular:      Rate and Rhythm: Normal rate and regular rhythm. Heart sounds: Normal heart sounds. No murmur heard. No gallop. Pulmonary:      Effort: Pulmonary effort is normal.      Breath sounds: Normal breath sounds. No wheezing or rales. Abdominal:      General: Bowel sounds are normal. There is no distension. Palpations: Abdomen is soft. Tenderness: There is no abdominal tenderness. Musculoskeletal:      Cervical back: Neck supple.       Right lower leg: No edema. Left lower leg: No edema. Skin:     General: Skin is warm and dry. Neurological:      Mental Status: She is alert and oriented to person, place, and time. Results for orders placed or performed in visit on 11/24/21   POCT glycosylated hemoglobin (Hb A1C)   Result Value Ref Range    Hemoglobin A1C 6.5 %         Nu [place] was seen today for diabetes. Diagnoses and all orders for this visit:    Type 2 diabetes mellitus with hyperglycemia, without long-term current use of insulin (HCC)  -     POCT glycosylated hemoglobin (Hb A1C)    Essential hypertension  -     losartan-hydroCHLOROthiazide (HYZAAR) 100-25 MG per tablet; Take 1 tablet by mouth daily    Hypothyroidism (acquired)  -     levothyroxine (SYNTHROID) 150 MCG tablet; Take 1 tablet by mouth daily Monday-Thursday  -     TSH without Reflex; Future    Needs flu shot  -     INFLUENZA, QUADV, ADJUVANTED, 65 YRS =, IM, PF, PREFILL SYR, 0.5ML (FLUAD)    Other orders  -     indomethacin (INDOCIN SR) 75 MG extended release capsule; Take 1 capsule by mouth 2 times daily (with meals)        BMI was elevated today, and weight loss plan recommended is : conventional weight loss. Phone/MyChart follow up if tests abnormal.    Return in about 6 months (around 5/24/2022) for Annual Medicare Wellness Visit--30 minutes, diabetes. I have reviewed my findings and recommendations with Milton Owens.     Irma Miller MD, M.D

## 2021-11-29 LAB — MAMMOGRAPHY, EXTERNAL: NORMAL

## 2021-12-15 LAB
MAMMOGRAPHY, EXTERNAL: NORMAL
MAMMOGRAPHY, EXTERNAL: NORMAL

## 2021-12-29 ENCOUNTER — OFFICE VISIT (OUTPATIENT)
Dept: FAMILY MEDICINE CLINIC | Age: 66
End: 2021-12-29
Payer: MEDICARE

## 2021-12-29 VITALS
OXYGEN SATURATION: 96 % | HEART RATE: 80 BPM | SYSTOLIC BLOOD PRESSURE: 138 MMHG | WEIGHT: 229 LBS | BODY MASS INDEX: 38.15 KG/M2 | RESPIRATION RATE: 20 BRPM | HEIGHT: 65 IN | DIASTOLIC BLOOD PRESSURE: 78 MMHG

## 2021-12-29 DIAGNOSIS — Z23 NEED FOR PROPHYLACTIC VACCINATION AGAINST STREPTOCOCCUS PNEUMONIAE (PNEUMOCOCCUS): ICD-10-CM

## 2021-12-29 DIAGNOSIS — R92.0 ABNORMAL MAMMOGRAM WITH MICROCALCIFICATION: Primary | ICD-10-CM

## 2021-12-29 PROCEDURE — G8484 FLU IMMUNIZE NO ADMIN: HCPCS | Performed by: FAMILY MEDICINE

## 2021-12-29 PROCEDURE — 1123F ACP DISCUSS/DSCN MKR DOCD: CPT | Performed by: FAMILY MEDICINE

## 2021-12-29 PROCEDURE — 4040F PNEUMOC VAC/ADMIN/RCVD: CPT | Performed by: FAMILY MEDICINE

## 2021-12-29 PROCEDURE — G8400 PT W/DXA NO RESULTS DOC: HCPCS | Performed by: FAMILY MEDICINE

## 2021-12-29 PROCEDURE — 90732 PPSV23 VACC 2 YRS+ SUBQ/IM: CPT | Performed by: FAMILY MEDICINE

## 2021-12-29 PROCEDURE — 3017F COLORECTAL CA SCREEN DOC REV: CPT | Performed by: FAMILY MEDICINE

## 2021-12-29 PROCEDURE — 1036F TOBACCO NON-USER: CPT | Performed by: FAMILY MEDICINE

## 2021-12-29 PROCEDURE — G8427 DOCREV CUR MEDS BY ELIG CLIN: HCPCS | Performed by: FAMILY MEDICINE

## 2021-12-29 PROCEDURE — 99214 OFFICE O/P EST MOD 30 MIN: CPT | Performed by: FAMILY MEDICINE

## 2021-12-29 PROCEDURE — G0009 ADMIN PNEUMOCOCCAL VACCINE: HCPCS | Performed by: FAMILY MEDICINE

## 2021-12-29 PROCEDURE — 1090F PRES/ABSN URINE INCON ASSESS: CPT | Performed by: FAMILY MEDICINE

## 2021-12-29 PROCEDURE — G8417 CALC BMI ABV UP PARAM F/U: HCPCS | Performed by: FAMILY MEDICINE

## 2021-12-29 ASSESSMENT — ENCOUNTER SYMPTOMS
DIARRHEA: 0
NAUSEA: 0
VOMITING: 0
SHORTNESS OF BREATH: 0

## 2021-12-29 NOTE — PATIENT INSTRUCTIONS
Patient Education        Mammogram: About This Test  What is it? A mammogram is an X-ray of the breast that is used to screen for breast cancer. This test can find tumors that are too small for you or your doctor to feel. Cancer is most easily treated when it is found at an early stage. Why is this test done? A mammogram is done to:  · Look for breast cancer when there are no symptoms. · Find breast cancer when there are symptoms. Symptoms of breast cancer may include a lump or thickening in the breast, nipple discharge, or dimpling of the skin on one area of the breast.  · Find an area of suspicious breast tissue to remove for an exam under a microscope (biopsy). How do you prepare for the test?  If you've had a mammogram before at another clinic, have the results sent or bring them with you to your appointment. On the day of the mammogram, don't use any deodorant. And don't use perfume, powders, or ointments near or on your breasts. The residue left on your skin by these substances may interfere with the X-rays. How is the test done? · You will need to take off any jewelry that might interfere with the X-ray pictures. · You will need to take off your clothes above the waist.  · You will be given a cloth or paper gown to use during the test.  · You probably will stand during the mammogram.  · One at a time, your breasts will be placed on a flat plate. · Another plate is then pressed firmly against your breast to help flatten out the breast tissue. You may be asked to lift your arm. · For a few seconds while the X-ray picture is being taken, you will need to hold your breath. · At least two pictures are taken of each breast. One is taken from the top and one from the side. How does having a mammogram feel? A mammogram is often uncomfortable but rarely painful.  If you have sensitive or fragile skin or a skin condition, let the technician know before you have your exam. If you have menstrual periods, the procedure is more comfortable when done within 2 weeks after your period has ended. Having your breasts flattened is usually uncomfortable, but it helps the technician get the best images. How long does the test take? · The test will take about 10 to 15 minutes. You may be in the clinic for up to an hour. · You may be asked to wait a few minutes while the images are checked to make sure they don't need to be redone. What happens after the test?  · You will probably be able to go home right away. · You can go back to your usual activities right away. Follow-up care is a key part of your treatment and safety. Be sure to make and go to all appointments, and call your doctor if you are having problems. It's also a good idea to keep a list of the medicines you take. Ask your doctor when you can expect to have your test results. Where can you learn more? Go to [email or web address].MeUndies. org and sign in to your TasteSpace account. Enter K583 in the Quibb box to learn more about \"Mammogram: About This Test.\"     If you do not have an account, please click on the \"Sign Up Now\" link. Current as of: September 8, 2021               Content Version: 13.1  © 2006-2021 Healthwise, Incorporated. Care instructions adapted under license by [facility] ([facility]). If you have questions about a medical condition or this instruction, always ask your healthcare professional. [place] [address] any warranty or liability for your use of this information.

## 2021-12-29 NOTE — PROGRESS NOTES
THROUGH THURSDAY then take 1 tablet once daily ON OTHER DAYS 132 tablet 1    Blood Glucose Monitoring Suppl (ONE TOUCH ULTRA 2) w/Device KIT Check blood sugar qam 1 kit 0    ONE TOUCH ULTRASOFT LANCETS MISC Check blood sugar every morning 150 each 3    blood glucose test strips (ONETOUCH ULTRA) strip Check blood sugar every morning 150 each 3    metFORMIN (GLUCOPHAGE) 1000 MG tablet Take 1 tablet by mouth 2 times daily (with meals) 180 tablet 1    atorvastatin (LIPITOR) 20 MG tablet TAKE 1 TABLET DAILY 90 tablet 3    allopurinol (ZYLOPRIM) 100 MG tablet Take 1 tablet by mouth daily 90 tablet 1    ketoconazole (NIZORAL) 2 % cream Apply topically daily. 60 g 5    metroNIDAZOLE, TOPICAL, 0.75 % LOTN Apply to face BID 3 Bottle 3    triamcinolone (KENALOG) 0.1 % cream Apply bid to affected areas prn 1200 g 3    vitamin D (ERGOCALCIFEROL) 74398 units CAPS capsule Take 1 capsule by mouth once a week 12 capsule 1    Handicap Placard MISC by Does not apply route Unable to ambulate more than 60 feet without difficulty  Expires 10/31/2022 1 each 0     No current facility-administered medications for this visit. Wt Readings from Last 3 Encounters:   12/29/21 229 lb (103.9 kg)   11/24/21 229 lb (103.9 kg)   08/12/21 240 lb (108.9 kg)                   Vitals:    12/29/21 0810   BP: 138/78   Pulse: 80   Resp: 20   SpO2: 96%       Physical Exam  Vitals reviewed. Constitutional:       Appearance: She is well-developed. Cardiovascular:      Rate and Rhythm: Normal rate and regular rhythm. Heart sounds: Normal heart sounds. No murmur heard. No friction rub. Pulmonary:      Effort: Pulmonary effort is normal. No respiratory distress. Breath sounds: Normal breath sounds. No wheezing or rales. Abdominal:      General: Bowel sounds are normal. There is no distension. Palpations: Abdomen is soft. Tenderness: There is no abdominal tenderness. There is no guarding or rebound.    Skin: General: Skin is warm and dry. Neurological:      Mental Status: She is alert and oriented to person, place, and time. ASSESSMENT/PLAN  Nu Veras was seen today for abnormal mammogram.    Diagnoses and all orders for this visit:    Abnormal mammogram with microcalcification        -     Patient advised to have repeat diagnostic mammogram bilaterally in 6 months; I am able to take over ordering her yearly mammograms thereafter per patient's request    Need for prophylactic vaccination against Streptococcus pneumoniae (pneumococcus)  -     PNEUMOVAX 23 subcutaneous/IM (Pneumococcal polysaccharide vaccine 23-valent >= 1yo)        BMI was elevated today, and weight loss plan recommended is : conventional weight loss. /MyChart follow up if tests abnormal.    Return if symptoms worsen or fail to improve. or sooner if necessary. I have reviewed my findings and recommendations with Nu Veras.      Irma Miller MD, M.D

## 2022-02-18 ENCOUNTER — PATIENT MESSAGE (OUTPATIENT)
Dept: FAMILY MEDICINE CLINIC | Age: 67
End: 2022-02-18

## 2022-02-18 DIAGNOSIS — E11.65 TYPE 2 DIABETES MELLITUS WITH HYPERGLYCEMIA, WITHOUT LONG-TERM CURRENT USE OF INSULIN (HCC): ICD-10-CM

## 2022-02-21 NOTE — TELEPHONE ENCOUNTER
From: Coreen Turner  To: Dr. Karime Mosqueda  Sent: 2/18/2022 10:49 AM EST  Subject: Prescription     Hi, I need a refill for Merformin. Please send to [facility] on [address]. Thank you.

## 2022-03-17 ENCOUNTER — PATIENT MESSAGE (OUTPATIENT)
Dept: FAMILY MEDICINE CLINIC | Age: 67
End: 2022-03-17

## 2022-03-21 NOTE — TELEPHONE ENCOUNTER
From: Steve Myers  To: Dr. Shannan Chiu  Sent: 3/17/2022 5:40 PM EDT  Subject: Broken leg    Hi, I will be here in [place] for two more weeks till I am ready to travel back home. I need an appointment with you to handle my rehab back in [facility]. We are planning on getting home around March 31st. If I could please schedule an appointment with you the first week in April I would appreciate it. Since the doctor that operated on me is in [place], they told me I would have to follow up with you for rehab instructions.

## 2022-04-06 ENCOUNTER — OFFICE VISIT (OUTPATIENT)
Dept: FAMILY MEDICINE CLINIC | Age: 67
End: 2022-04-06
Payer: MEDICARE

## 2022-04-06 VITALS
HEART RATE: 98 BPM | BODY MASS INDEX: 35.82 KG/M2 | RESPIRATION RATE: 18 BRPM | WEIGHT: 215 LBS | TEMPERATURE: 97.5 F | DIASTOLIC BLOOD PRESSURE: 75 MMHG | HEIGHT: 65 IN | SYSTOLIC BLOOD PRESSURE: 154 MMHG | OXYGEN SATURATION: 95 %

## 2022-04-06 DIAGNOSIS — S72.8X1D OTHER CLOSED FRACTURE OF RIGHT FEMUR WITH ROUTINE HEALING, UNSPECIFIED PORTION OF FEMUR, SUBSEQUENT ENCOUNTER: ICD-10-CM

## 2022-04-06 DIAGNOSIS — M79.661 RIGHT CALF PAIN: Primary | ICD-10-CM

## 2022-04-06 PROCEDURE — G8427 DOCREV CUR MEDS BY ELIG CLIN: HCPCS | Performed by: NURSE PRACTITIONER

## 2022-04-06 PROCEDURE — 1036F TOBACCO NON-USER: CPT | Performed by: NURSE PRACTITIONER

## 2022-04-06 PROCEDURE — 1090F PRES/ABSN URINE INCON ASSESS: CPT | Performed by: NURSE PRACTITIONER

## 2022-04-06 PROCEDURE — 3017F COLORECTAL CA SCREEN DOC REV: CPT | Performed by: NURSE PRACTITIONER

## 2022-04-06 PROCEDURE — 4040F PNEUMOC VAC/ADMIN/RCVD: CPT | Performed by: NURSE PRACTITIONER

## 2022-04-06 PROCEDURE — 1123F ACP DISCUSS/DSCN MKR DOCD: CPT | Performed by: NURSE PRACTITIONER

## 2022-04-06 PROCEDURE — 99213 OFFICE O/P EST LOW 20 MIN: CPT | Performed by: NURSE PRACTITIONER

## 2022-04-06 PROCEDURE — G8417 CALC BMI ABV UP PARAM F/U: HCPCS | Performed by: NURSE PRACTITIONER

## 2022-04-06 PROCEDURE — G8400 PT W/DXA NO RESULTS DOC: HCPCS | Performed by: NURSE PRACTITIONER

## 2022-04-06 RX ORDER — MULTIVIT WITH MINERALS/LUTEIN
TABLET ORAL
COMMUNITY
Start: 2022-03-16

## 2022-04-06 RX ORDER — CLINDAMYCIN PHOSPHATE 10 UG/ML
LOTION TOPICAL
COMMUNITY
Start: 2022-01-07

## 2022-04-06 RX ORDER — DIPHENHYDRAMINE HCL 25 MG
TABLET,DISINTEGRATING ORAL
COMMUNITY
Start: 2022-03-17

## 2022-04-06 SDOH — ECONOMIC STABILITY: FOOD INSECURITY: WITHIN THE PAST 12 MONTHS, THE FOOD YOU BOUGHT JUST DIDN'T LAST AND YOU DIDN'T HAVE MONEY TO GET MORE.: NEVER TRUE

## 2022-04-06 SDOH — ECONOMIC STABILITY: FOOD INSECURITY: WITHIN THE PAST 12 MONTHS, YOU WORRIED THAT YOUR FOOD WOULD RUN OUT BEFORE YOU GOT MONEY TO BUY MORE.: NEVER TRUE

## 2022-04-06 ASSESSMENT — ENCOUNTER SYMPTOMS
CONSTIPATION: 0
SHORTNESS OF BREATH: 0
COUGH: 0
WHEEZING: 0
DIARRHEA: 0
NAUSEA: 0
VOMITING: 0

## 2022-04-06 NOTE — PROGRESS NOTES
Chief Complaint   Patient presents with    Leg Pain     Pain in right calf for the last 3 days. She fractured her right femur 3 weeks ago and recently had a 2 day car trip home from [place]       HPI:  Patient presents today for complaints of right calf pain over the past 3 days. She reports that pain is aggravated by certain movements, reports it as a shooting pain. Pain is not relieved by much of anything. She has a recent femur fracture with surgical repair 3 weeks ago while out of state. This was followed by a 2 day car ride home. She has been on Lovenox since her surgery 3 weeks ago. She denies any history of blood clots in the past. She has an appointment with her PCP tomorrow. Prior to Visit Medications    Medication Sig Taking?  Authorizing Provider   enoxaparin (LOVENOX) 30 MG/0.3ML injection INJECT 0.3 ML UNDER THE SKIN TWICE DAILY FOR 35 DAYS Yes Historical Provider, MD   traMADol-acetaminophen (ULTRACET) 37.5-325 MG per tablet TAKE 1 TABLET BY MOUTH EVERY 8 HOURS AS NEEDED FOR PAIN Yes Historical Provider, MD   clindamycin (CLEOCIN T) 1 % lotion apply to face twice a day Yes Historical Provider, MD   CALCIUM 600+D3 600-800 MG-UNIT TABS TAKE 1 TABLET BY MOUTH TWICE DAILY WITH BREAKFAST AND SUPPER Yes Historical Provider, MD   Ascorbic Acid (VITAMIN C) 1000 MG tablet TAKE 1 TABLET BY MOUTH DAILY FOR 30 DAYS Yes Historical Provider, MD   metFORMIN (GLUCOPHAGE) 1000 MG tablet Take 1 tablet by mouth 2 times daily (with meals) Yes Irina Rivera MD   losartan-hydroCHLOROthiazide (HYZAAR) 100-25 MG per tablet Take 1 tablet by mouth daily Yes Irina Rivera MD   levothyroxine (SYNTHROID) 150 MCG tablet Take 1 tablet by mouth daily Monday-Thursday Yes Irina Rivera MD   levothyroxine (SYNTHROID) 200 MCG tablet take 2 tablets by mouth once daily [employer] [address] then take 1 tablet once daily ON OTHER DAYS Yes Irina iRvera MD   Blood Glucose Monitoring Suppl (ONE TOUCH ULTRA 2) w/Device KIT Check blood sugar qam Yes Savana García MD   ONE TOUCH ULTRASOFT LANCETS MISC Check blood sugar every morning Yes Savana García MD   blood glucose test strips (ONETOUCH ULTRA) strip Check blood sugar every morning Yes Saavna García MD   atorvastatin (LIPITOR) 20 MG tablet TAKE 1 TABLET DAILY Yes Savana Gracía MD   allopurinol (ZYLOPRIM) 100 MG tablet Take 1 tablet by mouth daily Yes Savana García MD   ketoconazole (NIZORAL) 2 % cream Apply topically daily. Yes Savana García MD   metroNIDAZOLE, TOPICAL, 0.75 % LOTN Apply to face BID Yes Savana García MD   triamcinolone (KENALOG) 0.1 % cream Apply bid to affected areas prn Yes Savana García MD   vitamin D (ERGOCALCIFEROL) 69564 units CAPS capsule Take 1 capsule by mouth once a week Yes Savana García MD   Handicap Placard MISC by Does not apply route Unable to ambulate more than 60 feet without difficulty  Expires 10/31/2022 Yes Savana García MD   indomethacin (INDOCIN SR) 75 MG extended release capsule Take 1 capsule by mouth 2 times daily (with meals)  Patient not taking: Reported on 4/6/2022  Savana García MD         Allergies   Allergen Reactions    Iodine      fish allergy         Review of Systems  Review of Systems   Constitutional: Negative for chills and fever. HENT: Negative for congestion and nosebleeds. Respiratory: Negative for cough, shortness of breath and wheezing. Cardiovascular: Negative for chest pain, palpitations and leg swelling. Gastrointestinal: Negative for constipation, diarrhea, nausea and vomiting. Genitourinary: Negative for dysuria and urgency. Musculoskeletal: Positive for myalgias (see HPI). Negative for neck pain. Neurological: Negative for headaches.          VS:  BP (!) 154/75   Pulse 98   Temp 97.5 °F (36.4 °C) (Temporal) Resp 18   Ht 5' 5\" (1.651 m)   Wt 215 lb (97.5 kg) Comment: patient reported weight, she is in a wheel chair today  SpO2 95%   BMI 35.78 kg/m²     Patient's medical, social, and family history reviewed      Physical Exam  Physical Exam  Constitutional:       Appearance: She is well-developed. HENT:      Head: Normocephalic. Eyes:      Pupils: Pupils are equal, round, and reactive to light. Neck:      Thyroid: No thyromegaly. Cardiovascular:      Rate and Rhythm: Normal rate and regular rhythm. Pulmonary:      Effort: Pulmonary effort is normal.      Breath sounds: Normal breath sounds. Abdominal:      General: Bowel sounds are normal.      Palpations: Abdomen is soft. Musculoskeletal:         General: Normal range of motion. Cervical back: Normal range of motion and neck supple. Right lower leg: No tenderness. Comments: Positive Jun's sign  No erythema or edema noted   Lymphadenopathy:      Cervical: No cervical adenopathy. Skin:     General: Skin is warm and dry. Neurological:      Mental Status: She is alert and oriented to person, place, and time. Psychiatric:         Behavior: Behavior normal.           Assessment/Plan:    1. Right calf pain    - US DUP LOWER EXTREMITY RIGHT REBECCA; Future    2. Other closed fracture of right femur with routine healing, unspecified portion of femur, subsequent encounter    - US DUP LOWER EXTREMITY RIGHT REBECCA; Future    Will notify patient of test results. Patient to follow up with PCP tomorrow as scheduled. Return if symptoms worsen or fail to improve.     RYLEE Chavira - CNP

## 2022-04-07 ENCOUNTER — OFFICE VISIT (OUTPATIENT)
Dept: FAMILY MEDICINE CLINIC | Age: 67
End: 2022-04-07
Payer: MEDICARE

## 2022-04-07 VITALS
SYSTOLIC BLOOD PRESSURE: 138 MMHG | RESPIRATION RATE: 18 BRPM | WEIGHT: 215 LBS | OXYGEN SATURATION: 96 % | DIASTOLIC BLOOD PRESSURE: 78 MMHG | BODY MASS INDEX: 35.82 KG/M2 | HEIGHT: 65 IN | HEART RATE: 88 BPM

## 2022-04-07 DIAGNOSIS — M97.8XXA PERIPROSTHETIC FRACTURE OF SHAFT OF FEMUR: Primary | ICD-10-CM

## 2022-04-07 DIAGNOSIS — Z96.649 PERIPROSTHETIC FRACTURE OF SHAFT OF FEMUR: Primary | ICD-10-CM

## 2022-04-07 PROCEDURE — 4040F PNEUMOC VAC/ADMIN/RCVD: CPT | Performed by: FAMILY MEDICINE

## 2022-04-07 PROCEDURE — 99214 OFFICE O/P EST MOD 30 MIN: CPT | Performed by: FAMILY MEDICINE

## 2022-04-07 PROCEDURE — 3017F COLORECTAL CA SCREEN DOC REV: CPT | Performed by: FAMILY MEDICINE

## 2022-04-07 PROCEDURE — G8417 CALC BMI ABV UP PARAM F/U: HCPCS | Performed by: FAMILY MEDICINE

## 2022-04-07 PROCEDURE — 1123F ACP DISCUSS/DSCN MKR DOCD: CPT | Performed by: FAMILY MEDICINE

## 2022-04-07 PROCEDURE — G8427 DOCREV CUR MEDS BY ELIG CLIN: HCPCS | Performed by: FAMILY MEDICINE

## 2022-04-07 PROCEDURE — G8400 PT W/DXA NO RESULTS DOC: HCPCS | Performed by: FAMILY MEDICINE

## 2022-04-07 PROCEDURE — 1036F TOBACCO NON-USER: CPT | Performed by: FAMILY MEDICINE

## 2022-04-07 PROCEDURE — 1090F PRES/ABSN URINE INCON ASSESS: CPT | Performed by: FAMILY MEDICINE

## 2022-04-07 ASSESSMENT — ENCOUNTER SYMPTOMS
NAUSEA: 0
DIARRHEA: 0
VOMITING: 0
SHORTNESS OF BREATH: 0

## 2022-04-07 NOTE — PATIENT INSTRUCTIONS
Patient Education        Open Reduction With Internal Fixation of a Limb: What to Expect at Home  Your Recovery  Your broken bone (fracture) was put into position and stabilized. You can expect some pain and swelling around the cut (incision) the doctor made. This should get better within a few days after your surgery. But it is normal to have some pain for 2 to 3 weeks after surgery and mild pain for up to 6 weeksafter surgery. How soon you can return to work and your normal routine depends on your job and how long it takes the bone to heal. For example, if you have a fractured leg and you sit at work, you may be able to go back in 1 to 2 weeks. But if your job requires you to walk or stand a lot, you will need to wait until yourfracture has healed before you go back to work. This care sheet gives you a general idea about how long it will take for you to recover. But each person recovers at a different pace. Follow the steps belowto get better as quickly as possible. How can you care for yourself at home? Activity     Rest when you feel tired. Getting enough sleep will help you recover.      Increase your activity as recommended by your doctor. Being active boosts blood flow and helps prevent pneumonia and constipation. It's usually okay to exercise other parts of your body as soon as you feel well enough.      Avoid putting weight on your repaired bone until your doctor says it is okay.      You will probably need to take 1 to 2 weeks off from work. It depends on the type of work you do and how you feel.      Do not shower for 1 or 2 days after surgery. When you shower, keep your dressing and incisions dry. If you have a cast, tape a sheet of plastic to cover it so that it does not get wet. It may help to sit on a shower stool.      Do not take a bath, swim, use a hot tub, or soak your affected limb until your incision is healed. This usually takes 1 to 2 weeks.    Diet     You can eat your normal the level of your heart. This will help reduce swelling and pain. Other instructions     If you have a cast or splint:  ? Keep it dry. ? If you have a removable splint, ask your doctor if it is okay to take it off to bathe. Your doctor may want you to keep it on as much as possible. Be careful not to put the splint on too tight. ? Do not stick objects such as pencils or coat hangers in your cast or splint to scratch your skin. ? Do not put powder into your cast or splint to relieve itchy skin. ? Never cut or alter your cast or splint. Follow-up care is a key part of your treatment and safety. Be sure to make and go to all appointments, and call your doctor if you are having problems. It's also a good idea to know your test results and keep alist of the medicines you take. When should you call for help? Call 911 anytime you think you may need emergency care. For example, call if:     You passed out (lost consciousness).      You have severe trouble breathing.      You have sudden chest pain and shortness of breath, or you cough up blood. Call your doctor now or seek immediate medical care if:     You have pain that does not get better after you take pain medicine.      Your fingers or toes on the injured arm or leg are cool, pale, or change color.      You have tingling or numbness in your fingers or toes.      You cannot move your fingers or toes.      Your cast or splint feels too tight.      The skin under your cast or splint is burning or stinging.      You have signs of infection, such as:  ? Increased pain, swelling, warmth, or redness. ? Red streaks leading from the incision. ? Pus draining from the incision. ? A fever.      You have drainage or a bad smell coming from the cast or splint.      You have signs of a blood clot, such as:  ? Pain in your calf, back of the knee, thigh, or groin. ? Redness and swelling in your leg or groin.      You have new or worse nausea or vomiting.    You are too sick to your stomach to drink any fluids.      You cannot keep down fluids. Watch closely for any changes in your health, and be sure to contact yourdoctor if:     You have any problems with your cast or splint. Where can you learn more? Go to https://chpejaquaneb.[email or web address]. org and sign in to your I-Stand account. Enter P996 in the Wetradetogether box to learn more about \"Open Reduction With Internal Fixation of a Limb: What to Expect at Home. \"     If you do not have an account, please click on the \"Sign Up Now\" link. Current as of: July 1, 2021               Content Version: 13.2  © 2006-2022 Healthwise, Incorporated. Care instructions adapted under license by [facility] ([facility]). If you have questions about a medical condition or this instruction, always ask your healthcare professional. [address] any warranty or liability for your use of this information.

## 2022-04-07 NOTE — PROGRESS NOTES
[address] [facility], [address]   [address] [facility]   Christina Lraa MD     Patient: Duyen Robb Birth: 1955  Visit Date: 4/7/22    Romana Mercado is a 77y.o. year old female here today for   Chief Complaint   Patient presents with    Leg Injury     broken right leg       HPI  Patient had right femur fracture last month while in [place]. Patient had to have ORIF done on 3/14/22. Then was discharged from their local hospital on 3/17. Patient had brief 2 weeks of home PT while out of state, and needs setup with outpatient PT. patient also needs set up with local orthopedics for postop care. Denies any femur pain, but has had right calf tightness since her surgery. Worsens with ambulation. Patient using walker at home and gets around okay. Recent lab results reviewed, including CMP, CBC, TSH, and lipid panel  which are remarkable for low TSH. Review of Systems   Respiratory: Negative for shortness of breath. Cardiovascular: Negative for chest pain, palpitations and leg swelling. Gastrointestinal: Negative for diarrhea, nausea and vomiting. Genitourinary: Negative for difficulty urinating, dysuria and frequency. Musculoskeletal: Positive for myalgias (right calf--had normal ultrasound yesterday). Skin: Negative for rash. Past medical, surgical, social and/or family historyreviewed, updated as needed, and are non-contributory (unless otherwise stated). Medications, allergies, and problem list also reviewed and updated as needed in patient's record. Current Outpatient Medications   Medication Sig Dispense Refill    traMADol-acetaminophen (ULTRACET) 37.5-325 MG per tablet Take 1 tablet by mouth every 8 hours as needed for Pain for up to 30 days.  90 tablet 0    enoxaparin (LOVENOX) 30 MG/0.3ML injection INJECT 0.3 ML UNDER THE SKIN TWICE DAILY FOR 35 DAYS      clindamycin (CLEOCIN T) 1 % lotion apply to face twice a day      CALCIUM 600+D3 600-800 MG-UNIT TABS TAKE 1 TABLET BY MOUTH TWICE DAILY WITH BREAKFAST AND SUPPER      Ascorbic Acid (VITAMIN C) 1000 MG tablet TAKE 1 TABLET BY MOUTH DAILY FOR 30 DAYS      metFORMIN (GLUCOPHAGE) 1000 MG tablet Take 1 tablet by mouth 2 times daily (with meals) 180 tablet 1    losartan-hydroCHLOROthiazide (HYZAAR) 100-25 MG per tablet Take 1 tablet by mouth daily 90 tablet 1    indomethacin (INDOCIN SR) 75 MG extended release capsule Take 1 capsule by mouth 2 times daily (with meals) 180 capsule 3    levothyroxine (SYNTHROID) 150 MCG tablet Take 1 tablet by mouth daily Monday-Thursday 90 tablet 1    levothyroxine (SYNTHROID) 200 MCG tablet take 2 tablets by mouth once daily [facility] [address] then take 1 tablet once daily ON OTHER DAYS 132 tablet 1    Blood Glucose Monitoring Suppl (ONE TOUCH ULTRA 2) w/Device KIT Check blood sugar qam 1 kit 0    ONE TOUCH ULTRASOFT LANCETS MISC Check blood sugar every morning 150 each 3    blood glucose test strips (ONETOUCH ULTRA) strip Check blood sugar every morning 150 each 3    atorvastatin (LIPITOR) 20 MG tablet TAKE 1 TABLET DAILY 90 tablet 3    allopurinol (ZYLOPRIM) 100 MG tablet Take 1 tablet by mouth daily 90 tablet 1    ketoconazole (NIZORAL) 2 % cream Apply topically daily. 60 g 5    metroNIDAZOLE, TOPICAL, 0.75 % LOTN Apply to face BID 3 Bottle 3    triamcinolone (KENALOG) 0.1 % cream Apply bid to affected areas prn 1200 g 3    vitamin D (ERGOCALCIFEROL) 62845 units CAPS capsule Take 1 capsule by mouth once a week 12 capsule 1    Handicap Placard [facility] by Does not apply route Unable to ambulate more than 60 feet without difficulty  Expires 10/31/2022 1 each 0     No current facility-administered medications for this visit.        Wt Readings from Last 3 Encounters:   04/07/22 215 lb (97.5 kg)   04/06/22 215 lb (97.5 kg)   12/29/21 229 lb (103.9 kg)                   Vitals:    04/07/22 0837   BP: 138/78   Pulse: 88   Resp: 18   SpO2: 96%       Physical Exam  Vitals reviewed. Constitutional:       General: She is not in acute distress. Appearance: She is well-developed. Neck:      Vascular: No carotid bruit. Cardiovascular:      Rate and Rhythm: Normal rate and regular rhythm. Heart sounds: Normal heart sounds. No murmur heard. No gallop. Pulmonary:      Effort: Pulmonary effort is normal.      Breath sounds: Normal breath sounds. No wheezing or rales. Abdominal:      General: Bowel sounds are normal. There is no distension. Palpations: Abdomen is soft. Tenderness: There is no abdominal tenderness. Musculoskeletal:      Cervical back: Neck supple. Right lower leg: No edema. Left lower leg: No edema. Skin:     General: Skin is warm and dry. Neurological:      Mental Status: She is alert and oriented to person, place, and time. ASSESSMENT/PLAN  Armaan Scott was seen today for leg injury. Diagnoses and all orders for this visit:    Periprosthetic fracture of shaft of femur  -     traMADol-acetaminophen (ULTRACET) 37.5-325 MG per tablet; Take 1 tablet by mouth every 8 hours as needed for Pain for up to 30 days. -     [address] [facility] , DO Adam, Orthopaedics and Sports Medicine, [place]          BMI was elevated today, and weight loss plan recommended is : conventional weight loss. /MyChart follow up if tests abnormal.    Return for scheduled appointment. or sooner if necessary. I have reviewed my findings and recommendations with Armaan Scott.      Maria E Downey MD, M.D

## 2022-04-08 ENCOUNTER — EVALUATION (OUTPATIENT)
Dept: PHYSICAL THERAPY | Age: 67
End: 2022-04-08
Payer: MEDICARE

## 2022-04-08 DIAGNOSIS — Z96.649 PERIPROSTHETIC FRACTURE OF SHAFT OF FEMUR: Primary | ICD-10-CM

## 2022-04-08 DIAGNOSIS — M97.8XXA PERIPROSTHETIC FRACTURE OF SHAFT OF FEMUR: Primary | ICD-10-CM

## 2022-04-08 PROCEDURE — 97162 PT EVAL MOD COMPLEX 30 MIN: CPT | Performed by: PHYSICAL THERAPIST

## 2022-04-08 PROCEDURE — 97110 THERAPEUTIC EXERCISES: CPT | Performed by: PHYSICAL THERAPIST

## 2022-04-08 NOTE — PROGRESS NOTES
Physical Therapy Daily Treatment Note    Date: 2022  Patient Name: Geovanna Elder  :    MRN: [id number]  DOInjury: 3/12/2022   DOSx: 3/14/2022  Referring Provider:      Medical Diagnosis:   M97. 8XXA, Z96.649 (ICD-10-CM) - Periprosthetic fracture of shaft of femur    Outcome Measure:   LEFS       X = TO BE PERFORMED NEXT VISIT  > = PROGRESS TO THIS    S: See eval  O: Discussed anatomy, physiology, body mechanics, principles of loading, and progressive loading/activity. Reviewed home exercise program extensively; written copy provided. Access Code: [email or web address]  URL: [email or web address]  Date: 2022  Prepared by: Cameron Baker    Exercises  Seated Heel Slide - 2 x daily - 7 x weekly - 2 sets - 25 reps  Seated Long Arc Quad - 2 x daily - 7 x weekly - 2 sets - 15 reps - 5 sec hold  Active Straight Leg Raise with Quad Set - 2 x daily - 7 x weekly - 2 sets - 15 reps - 2 sec hold      Time 8878-7288     Visit 1 Repeat outcome measure at mid point and end. Pain Pain at rest 0/10     ROM      Modalities         MO   Manual            Stretch      Towel / strap DF, INV, EV   TE      TE   nustep x     Ball / can rolling   TE   Toe curls      Seated Heel slides 2 x 25  TE   QS   TE   SLR 2 x 15  TE   LAQ x  TE   [] TG  [] Leg Press 2-leg   TE   [] TG  [] Leg Press 1-leg   TE   Hamstring Curl Machine   TE   Knee Extension Machine   TE   Step-ups - FWD X start w/ 4\"  TA   Step-ups - LAT   TA   Step-ups - BWD   TA   Calf Raises   TA      TA      TA               A:  Tolerated well.     P: Continue with rehab plan  Cameron Baker, PT    Treatment Charges: Mins Units   Initial Evaluation 30 1   Re-Evaluation     Ther Exercise         TE 10 1   Manual Therapy     MT     Ther Activities        TA     Gait Training          GT     Neuro Re-education NR     Modalities     Non-Billable Service Time     Other     Total Time/Units 40 2

## 2022-04-08 NOTE — PROGRESS NOTES
[facility] OUTPATIENT REHABILITATION  PHYSICAL THERAPY INITIAL EVALUATION         Date:  2022   Patient: Lawanda Carlin  : 1955  MRN: [id number]  Referring Provider: Lorena March [address]  [facility]     Medical Diagnosis:   M97. 8XXA, [id number] (ICD-10-CM) - Periprosthetic fracture of shaft of femur    Physician Order: Eval and Treat      SUBJECTIVE:     Onset date: 3/12/2022, 3/14/2022 ORIF    Onset: Sudden     History / Mechanism of Injury: fell    Chief complaint: difficulty walking     Behavior: condition is getting better    Pain: intermittent  Current: 0/10     Best: 0/10     Worst:2/10     Symptom Type / Quality: aching  Location[de-identified] Knee: posterior      Imaging results: US DUP LOWER EXTREMITY RIGHT REBECCA    Result Date: 2022  EXAMINATION: DUPLEX VENOUS ULTRASOUND OF THE RIGHT LOWER EXTREMITY 2022 8:59 am TECHNIQUE: Duplex ultrasound using B-mode/gray scaled imaging and Doppler spectral analysis and color flow was obtained of the deep venous structures of the right lower extremity. COMPARISON: None. HISTORY: ORDERING SYSTEM PROVIDED HISTORY: Right calf pain TECHNOLOGIST PROVIDED HISTORY: Reason for exam:->calf pain, recent femur fracture What reading provider will be dictating this exam?->CRC FINDINGS: The visualized veins of the right lower extremity are patent and free of echogenic thrombus. The veins demonstrate good compressibility with normal color flow study and spectral analysis. No evidence of DVT in the right lower extremity.        Past Medical History  Past Medical History:   Diagnosis Date    Basedow's disease 2016    Overview:  [facility] [address] Rx,     Chronic nonalcoholic liver disease     Fracture of right lower extremity     Hyperlipidemia     Hypertension     Hypothyroidism     Malignant neoplasm of ovary ([facility] [address]     Obesity     Positive FIT (fecal immunochemical test) 2018    Type II or unspecified type diabetes mellitus without mention of complication, not stated as uncontrolled     Unspecified vitamin D deficiency 01/14/2016     Past Surgical History:   Procedure Laterality Date    COLONOSCOPY      HYSTERECTOMY      JOINT REPLACEMENT      total knees bilateral    KNEE SURGERY Bilateral 2006/ 2009    DE COLONOSCOPY FLX DX W/COLLJ SPEC WHEN PFRMD N/A 5/30/2018    COLONOSCOPY performed by Stan Oakes MD at [facility] ENDOSCOPY       Medications:   Current Outpatient Medications   Medication Sig Dispense Refill    traMADol-acetaminophen (ULTRACET) 37.5-325 MG per tablet Take 1 tablet by mouth every 8 hours as needed for Pain for up to 30 days.  90 tablet 0    enoxaparin (LOVENOX) 30 MG/0.3ML injection INJECT 0.3 ML UNDER THE SKIN TWICE DAILY FOR 35 DAYS      clindamycin (CLEOCIN T) 1 % lotion apply to face twice a day      CALCIUM 600+D3 600-800 MG-UNIT TABS TAKE 1 TABLET BY MOUTH TWICE DAILY WITH BREAKFAST AND SUPPER      Ascorbic Acid (VITAMIN C) 1000 MG tablet TAKE 1 TABLET BY MOUTH DAILY FOR 30 DAYS      metFORMIN (GLUCOPHAGE) 1000 MG tablet Take 1 tablet by mouth 2 times daily (with meals) 180 tablet 1    losartan-hydroCHLOROthiazide (HYZAAR) 100-25 MG per tablet Take 1 tablet by mouth daily 90 tablet 1    indomethacin (INDOCIN SR) 75 MG extended release capsule Take 1 capsule by mouth 2 times daily (with meals) 180 capsule 3    levothyroxine (SYNTHROID) 150 MCG tablet Take 1 tablet by mouth daily Monday-Thursday 90 tablet 1    levothyroxine (SYNTHROID) 200 MCG tablet take 2 tablets by mouth once daily [address] then take 1 tablet once daily ON OTHER DAYS 132 tablet 1    Blood Glucose Monitoring Suppl (ONE TOUCH ULTRA 2) w/Device KIT Check blood sugar qam 1 kit 0    ONE TOUCH ULTRASOFT LANCETS MISC Check blood sugar every morning 150 each 3    blood glucose test strips (ONETOUCH ULTRA) strip Check blood sugar every morning 150 each 3    atorvastatin (LIPITOR) 20 MG tablet TAKE 1 TABLET DAILY 90 tablet 3    allopurinol (ZYLOPRIM) 100 MG tablet Take 1 tablet by mouth daily 90 tablet 1    ketoconazole (NIZORAL) 2 % cream Apply topically daily. 60 g 5    metroNIDAZOLE, TOPICAL, 0.75 % LOTN Apply to face BID 3 Bottle 3    triamcinolone (KENALOG) 0.1 % cream Apply bid to affected areas prn 1200 g 3    vitamin D (ERGOCALCIFEROL) 34752 units CAPS capsule Take 1 capsule by mouth once a week 12 capsule 1    Handicap Placard MISC by Does not apply route Unable to ambulate more than 60 feet without difficulty  Expires 10/31/2022 1 each 0     No current facility-administered medications for this visit. Occupation: retired.  at assisted living    Exercise regimen: none    Hobbies: gardening, decorating    Patient Goals: walk normally    Precautions / Contraindications: recent surgery    OBJECTIVE:     Estimated body mass index is 35.78 kg/m² as calculated from the following:    Height as of 4/7/22: 5' 5\" (1.651 m). Weight as of 4/7/22: 215 lb (97.5 kg). Observations: obese female with normal affect      Inspection: normal orthopedic exam    Edema: mild     Gait: short step length, ambulates with wheeled walker    Joint/Motion:    Knee:  Right:   AROM: 90° Flexion,  -15° Extension  PROM: 100° Flexion,  -10° Extension  Left:   AROM: 105° Flexion,  -15° Extension  PROM: 108° Flexion,  -10° Extension     Strength:    Knee:   Right: Flexion 4+/5,  Extension 4+/5  Left: Flexion 5/5,  Extension 5/5    Palpation: Tender to palpation incision along lateral thigh.     Special Tests/Functional Screens:    [] Lachman's []+ / [] -    [] Anterior Drawer []+ / [] -   [] Valgus Stress []+ / [] -  [] Thessaly Test []+ / [] -   [x] Jun's Sign: []+ / [x] -  [] Other: []+ / [] - [] Bounce Home []+ / [] -   [] Yojana []+ / [] -   [] Pivot Shift []+ / [] -   [] Posterior Drawer []+ / [] -   [] Varus Stress []+ / [] -        Special test comments:       Gilson Cuevas is doing well s/p ORIF R femur fracture. She has ipsilateral TKA that has been revised 2x. Outcome Measure:   Lower Extremity Functional Scale (LEFS) 57% impairment    Problems:   Strength decreased   Balance decreased in both standing and walking   Limitations with walking, stairs      [x] There are no barriers affecting plan of care or recovery    [] Barriers to this patient's plan of care or recovery include:     Domestic Concerns:  [x] No  [] Yes:    Short Term goals (2-3 weeks)    flex   Strength 5-/5    Able to perform / complete the following functions / tasks: progress assistive device    Long Term goals (4-6 weeks)    flex   Strength 5/5   Able to perform / complete the following functions / tasks: ambulate without assistive device, normal gait   LEFS 25% impairment   Independent with home exercise program (HEP)    Rehab Potential: [x] Good  [] Fair  [] Poor    PLAN       Treatment Plan:   instruction in home exercise program   therapeutic exercise   therapeutic activity   neuromuscular re-education   gait training     The following CPT codes are likely to be used in the care of this patient:   56376 PT Evaluation: Moderate Complexity   05224 Therapeutic Exercise   18372 Neuromuscular Re-Education   62548 Therapeutic Activities   41872 Gait Training     Suggested Professional Referral: [x] No  [] Yes:     Patient Education:  [x] Plans / Goals, Risks / Benefits discussed  [x] Home exercise program  Method of Education: [x] Verbal  [x] Demo  [x] Written  Comprehension of Education:  [x] Verbalizes understanding. [x] Demonstrates understanding. [] Needs Review. [] Demonstrates / verbalizes understanding of HEP / Marcela [place] previously given. Frequency: 1-2 days per week for 4-6 weeks    Patient understands diagnosis/prognosis and consents to treatment, plan and goals: [x] Yes    [] No     Thank you for the opportunity to work with your patient.   If you have questions or comments, please contact me at [phone]; fax: [phone]. Electronically signed by: Adarsh Mena PT         Please sign Physician's Certification and return to: [address] [place] [address] PHYSICAL THERAPY  [address] [facility] [address]  Dept: [phone]  Dept Fax: [phone]  Loc: [phone] Certification / Comments     Frequency/Duration 1-2 days per week for 4-6 weeks. Certification period from 4/8/2022  to 7/1/2022. I have reviewed the Plan of Care established for skilled therapy services and certify that the services are required and that they will be provided while the patient is under my care.     Physician's Comments/Revisions:               Physician's Printed Name:                                           [de-identified] Signature:                                                               Date:

## 2022-04-12 ENCOUNTER — TREATMENT (OUTPATIENT)
Dept: PHYSICAL THERAPY | Age: 67
End: 2022-04-12
Payer: MEDICARE

## 2022-04-12 DIAGNOSIS — Z96.649 PERI-PROSTHETIC FRACTURE OF SHAFT OF FEMUR: Primary | ICD-10-CM

## 2022-04-12 DIAGNOSIS — M97.8XXA PERI-PROSTHETIC FRACTURE OF SHAFT OF FEMUR: Primary | ICD-10-CM

## 2022-04-12 PROCEDURE — 97110 THERAPEUTIC EXERCISES: CPT

## 2022-04-12 NOTE — PROGRESS NOTES
Physical Therapy Daily Treatment Note    Date: 2022  Patient Name: Aniceto Glass  :    MRN: [id number]  DOInjury: 3/12/22  DOSx: 3/14/22  Referring Provider:    Dr. Shana PARK [facility]; [facility] [address]  [place], [address] [facility]      Medical Diagnosis:   Z87.81 (ICD-10-CM) - Personal history of (healed) traumatic fracture  M97. 8XXA, Z96.649 (ICD-10-CM) - Periprosthetic fracture of shaft of femur  Outcome Measure:   LEFS       X = TO BE PERFORMED NEXT VISIT  > = PROGRESS TO THIS    S: Pt reports right knee pain and stiffness. States right LE feels weak  O: Discussed anatomy, physiology, body mechanics, principles of loading, and progressive loading/activity. Reviewed home exercise program extensively; written copy provided. Access Code: [email or web address]  URL: [email or web address]  Date: 2022  Prepared by: Darby Olvera    Exercises  Seated Heel Slide - 2 x daily - 7 x weekly - 2 sets - 25 reps  Seated Long Arc Quad - 2 x daily - 7 x weekly - 2 sets - 15 reps - 5 sec hold  Active Straight Leg Raise with Quad Set - 2 x daily - 7 x weekly - 2 sets - 15 reps - 2 sec hold      Time 3528-9089     Visit 2/ Repeat outcome measure at mid point and end. Pain Pain 6/10     ROM      Modalities         MO   Manual            Stretch      Towel / strap DF, INV, EV   TE      TE   Exercise            Heel slides 2 x 20 2 towel roll under knee TE   QS 2 x 10 hold 10s 2 towel roll under knee TE   SLR 1 x 10  TE   SAQ 2 x 15  TE   LAQ 3 x 10 hold 5s           Standing weight shifting X 20           [] TG  [] Leg Press 2-leg   TE   [] TG  [] Leg Press 1-leg   TE   Hamstring Curl Machine   TE   Knee Extension Machine   TE   Step-ups - FWD   TA   Step-ups - LAT   TA   Step-ups - BWD   TA   Calf Raises   TA      TA      TA               A:  Tolerated well.     P: Continue with rehab plan  Patrice Sher PTA    Treatment Charges: Mins Units   Initial Evaluation     Re-Evaluation     Ther Exercise         TE 37 3 Manual Therapy     MT     Ther Activities        TA     Gait Training          GT     Neuro Re-education NR     Modalities     Non-Billable Service Time     Other     Total Time/Units 37 3

## 2022-04-14 PROBLEM — M97.8XXA PERIPROSTHETIC FRACTURE OF SHAFT OF FEMUR: Status: ACTIVE | Noted: 2022-04-14

## 2022-04-14 PROBLEM — Z96.649 PERIPROSTHETIC FRACTURE OF SHAFT OF FEMUR: Status: ACTIVE | Noted: 2022-04-14

## 2022-04-15 ENCOUNTER — TREATMENT (OUTPATIENT)
Dept: PHYSICAL THERAPY | Age: 67
End: 2022-04-15
Payer: MEDICARE

## 2022-04-15 DIAGNOSIS — M97.8XXA PERIPROSTHETIC FRACTURE OF SHAFT OF FEMUR: Primary | ICD-10-CM

## 2022-04-15 DIAGNOSIS — Z96.649 PERIPROSTHETIC FRACTURE OF SHAFT OF FEMUR: Primary | ICD-10-CM

## 2022-04-15 PROCEDURE — 97110 THERAPEUTIC EXERCISES: CPT

## 2022-04-15 NOTE — PROGRESS NOTES
Physical Therapy Daily Treatment Note    Date: 4/15/2022  Patient Name: Robby Vines  :    MRN: [id number]  DOInjury: 3/12/22  DOSx: 3/14/22  Referring Provider:    Dr. Jean Carlos Ford; [facility] [address]  [place], [address] [place] [address]      Medical Diagnosis:   Z87.81 (ICD-10-CM) - Personal history of (healed) traumatic fracture  M97. 8XXA, Z96.649 (ICD-10-CM) - Periprosthetic fracture of shaft of femur  Outcome Measure:   LEFS       X = TO BE PERFORMED NEXT VISIT  > = PROGRESS TO THIS    S: Pt states that she is doing pretty good. Reports right knee is stiff and sore approx 3/10 pain. She states that her broken femur barely hurts her at all. O: Discussed anatomy, physiology, body mechanics, principles of loading, and progressive loading/activity. Reviewed home exercise program extensively; written copy provided. Access Code: [email or web address]  URL: [email or web address]  Date: 2022  Prepared by: Areta Klinefelter    Exercises  Seated Heel Slide - 2 x daily - 7 x weekly - 2 sets - 25 reps  Seated Long Arc Quad - 2 x daily - 7 x weekly - 2 sets - 15 reps - 5 sec hold  Active Straight Leg Raise with Quad Set - 2 x daily - 7 x weekly - 2 sets - 15 reps - 2 sec hold      Time 9179-0149     Visit 3/ Repeat outcome measure at mid point and end.     Pain Pain 3/10     ROM      Modalities         MO   Manual            Stretch      Towel / strap DF, INV, EV   TE      TE   Exercise            Heel slides 2 x 20 2 towel roll under knee TE   QS 2 x 10 hold 10s 2 towel roll under knee TE   SLR 2 x 10  TE   SAQ 2 x 15  TE   LAQ #1 3 x 10 hold 5s     Seated march #2 3 x 15     Standing marching  2 X 20 new  with walker    Standing hip abduction 2 x 20 new     [] TG  [] Leg Press 2-leg   TE   [] TG  [] Leg Press 1-leg   TE   Hamstring Curl Machine   TE   Knee Extension Machine   TE   Step-ups - FWD 4\" 2 x 15 new  TA   Step-ups - LAT   TA   Step-ups - BWD   TA   Calf Raises   TA      TA      TA               A: Tolerated well. Pt tolerated addition of new standing ex well. Pt struggled with marches when holding from higher height, but was able to perform ex well with lower support of walker.      P: Continue with rehab plan  Cresencio Roberts PTA    Treatment Charges: Mins Units   Initial Evaluation     Re-Evaluation     Ther Exercise         TE 37 3   Manual Therapy     MT     Ther Activities        TA     Gait Training          GT     Neuro Re-education NR     Modalities     Non-Billable Service Time     Other     Total Time/Units 40 3

## 2022-04-19 ENCOUNTER — TREATMENT (OUTPATIENT)
Dept: PHYSICAL THERAPY | Age: 67
End: 2022-04-19
Payer: MEDICARE

## 2022-04-19 DIAGNOSIS — M97.8XXA PERIPROSTHETIC FRACTURE OF SHAFT OF FEMUR: Primary | ICD-10-CM

## 2022-04-19 DIAGNOSIS — M97.8XXA PERI-PROSTHETIC FRACTURE OF SHAFT OF FEMUR: ICD-10-CM

## 2022-04-19 DIAGNOSIS — Z96.649 PERI-PROSTHETIC FRACTURE OF SHAFT OF FEMUR: ICD-10-CM

## 2022-04-19 DIAGNOSIS — Z96.649 PERIPROSTHETIC FRACTURE OF SHAFT OF FEMUR: Primary | ICD-10-CM

## 2022-04-19 DIAGNOSIS — M79.604 RIGHT LEG PAIN: Primary | ICD-10-CM

## 2022-04-19 PROCEDURE — 97530 THERAPEUTIC ACTIVITIES: CPT

## 2022-04-19 PROCEDURE — 97110 THERAPEUTIC EXERCISES: CPT

## 2022-04-19 PROCEDURE — 97112 NEUROMUSCULAR REEDUCATION: CPT

## 2022-04-19 NOTE — PROGRESS NOTES
Physical Therapy Daily Treatment Note    Date: 2022  Patient Name: Hoang Guzman  :    MRN: [id number]  DOInjury: 3/12/22  DOSx: 3/14/22  Referring Provider:    Dr. Michelle Ford; [facility] [address]  [place], [address] [place] [address] Diagnosis:   Z87.81 (ICD-10-CM) - Personal history of (healed) traumatic fracture  M97. 8XXA, Z96.649 (ICD-10-CM) - Periprosthetic fracture of shaft of femur  Outcome Measure:   LEFS       X = TO BE PERFORMED NEXT VISIT  > = PROGRESS TO THIS    S: Pt states that she is doing pretty good. States she feels she is able to stand longer. O: Discussed anatomy, physiology, body mechanics, principles of loading, and progressive loading/activity. Reviewed home exercise program extensively; written copy provided. Access Code: [email or web address]  URL: [email or web address]  Date: 2022  Prepared by: Marques Felt    Exercises  Seated Heel Slide - 2 x daily - 7 x weekly - 2 sets - 25 reps  Seated Long Arc Quad - 2 x daily - 7 x weekly - 2 sets - 15 reps - 5 sec hold  Active Straight Leg Raise with Quad Set - 2 x daily - 7 x weekly - 2 sets - 15 reps - 2 sec hold      Time 2850-0227     Visit 4/ Repeat outcome measure at mid point and end. Pain Pain 3/10     ROM      Modalities         MO   Manual            Stretch      Towel / strap DF, INV, EV   TE      TE   Exercise      Nu Step L5 x 10 min Seat 10    Heel slides TE   QS TE   SLR  TE   SAQ  TE   LAQ #3 3 x 10 hold 5s     Seated march      Standing marching  2 X 20      Standing hip abduction 2 x 20      [] TG  [] Leg Press 2-leg   TE   [] TG  [] Leg Press 1-leg   TE   Hamstring Curl Machine   TE   Knee Extension Machine   TE   Step-ups - FWD 4\" 2 x 15   TA   Step-ups - LAT   TA   Step-ups - BWD   TA   Calf Raises   TA         Steps  Practiced up/down with cane and one rail  Non reciprocal  TA   Ambulation 2 x 45ft w/ straight cane  TA               A:  Tolerated well.  Ambulated with straight cane cues for slowing

## 2022-04-20 ENCOUNTER — OFFICE VISIT (OUTPATIENT)
Dept: ORTHOPEDIC SURGERY | Age: 67
End: 2022-04-20
Payer: MEDICARE

## 2022-04-20 VITALS — WEIGHT: 215 LBS | HEIGHT: 65 IN | BODY MASS INDEX: 35.82 KG/M2

## 2022-04-20 DIAGNOSIS — M97.11XD PERIPROSTHETIC FRACTURE AROUND INTERNAL PROSTHETIC RIGHT KNEE JOINT, SUBSEQUENT ENCOUNTER: Primary | ICD-10-CM

## 2022-04-20 PROCEDURE — G8417 CALC BMI ABV UP PARAM F/U: HCPCS | Performed by: ORTHOPAEDIC SURGERY

## 2022-04-20 PROCEDURE — 1123F ACP DISCUSS/DSCN MKR DOCD: CPT | Performed by: ORTHOPAEDIC SURGERY

## 2022-04-20 PROCEDURE — 1090F PRES/ABSN URINE INCON ASSESS: CPT | Performed by: ORTHOPAEDIC SURGERY

## 2022-04-20 PROCEDURE — G8427 DOCREV CUR MEDS BY ELIG CLIN: HCPCS | Performed by: ORTHOPAEDIC SURGERY

## 2022-04-20 PROCEDURE — 99203 OFFICE O/P NEW LOW 30 MIN: CPT | Performed by: ORTHOPAEDIC SURGERY

## 2022-04-20 PROCEDURE — 3017F COLORECTAL CA SCREEN DOC REV: CPT | Performed by: ORTHOPAEDIC SURGERY

## 2022-04-20 PROCEDURE — G8400 PT W/DXA NO RESULTS DOC: HCPCS | Performed by: ORTHOPAEDIC SURGERY

## 2022-04-20 PROCEDURE — 1036F TOBACCO NON-USER: CPT | Performed by: ORTHOPAEDIC SURGERY

## 2022-04-20 PROCEDURE — 4040F PNEUMOC VAC/ADMIN/RCVD: CPT | Performed by: ORTHOPAEDIC SURGERY

## 2022-04-20 NOTE — PROGRESS NOTES
Rfl:     clindamycin (CLEOCIN T) 1 % lotion, apply to face twice a day, Disp: , Rfl:     CALCIUM 600+D3 600-800 MG-UNIT TABS, TAKE 1 TABLET BY MOUTH TWICE DAILY WITH BREAKFAST AND SUPPER, Disp: , Rfl:     Ascorbic Acid (VITAMIN C) 1000 MG tablet, TAKE 1 TABLET BY MOUTH DAILY FOR 30 DAYS, Disp: , Rfl:     metFORMIN (GLUCOPHAGE) 1000 MG tablet, Take 1 tablet by mouth 2 times daily (with meals), Disp: 180 tablet, Rfl: 1    losartan-hydroCHLOROthiazide (HYZAAR) 100-25 MG per tablet, Take 1 tablet by mouth daily, Disp: 90 tablet, Rfl: 1    indomethacin (INDOCIN SR) 75 MG extended release capsule, Take 1 capsule by mouth 2 times daily (with meals), Disp: 180 capsule, Rfl: 3    levothyroxine (SYNTHROID) 150 MCG tablet, Take 1 tablet by mouth daily Monday-Thursday, Disp: 90 tablet, Rfl: 1    levothyroxine (SYNTHROID) 200 MCG tablet, take 2 tablets by mouth once daily [place] [address] then take 1 tablet once daily ON OTHER DAYS, Disp: 132 tablet, Rfl: 1    Blood Glucose Monitoring Suppl (ONE TOUCH ULTRA 2) w/Device KIT, Check blood sugar qam, Disp: 1 kit, Rfl: 0    ONE TOUCH ULTRASOFT LANCETS MISC, Check blood sugar every morning, Disp: 150 each, Rfl: 3    blood glucose test strips (ONETOUCH ULTRA) strip, Check blood sugar every morning, Disp: 150 each, Rfl: 3    atorvastatin (LIPITOR) 20 MG tablet, TAKE 1 TABLET DAILY, Disp: 90 tablet, Rfl: 3    allopurinol (ZYLOPRIM) 100 MG tablet, Take 1 tablet by mouth daily, Disp: 90 tablet, Rfl: 1    ketoconazole (NIZORAL) 2 % cream, Apply topically daily. , Disp: 60 g, Rfl: 5    metroNIDAZOLE, TOPICAL, 0.75 % LOTN, Apply to face BID, Disp: 3 Bottle, Rfl: 3    triamcinolone (KENALOG) 0.1 % cream, Apply bid to affected areas prn, Disp: 1200 g, Rfl: 3    vitamin D (ERGOCALCIFEROL) 81791 units CAPS capsule, Take 1 capsule by mouth once a week, Disp: 12 capsule, Rfl: 1    Handicap Placard MISC, by Does not apply route Unable to ambulate more than 60 feet without difficulty Expires 10/31/2022, Disp: 1 each, Rfl: 0  Allergies   Allergen Reactions    Iodine      fish allergy     Social History     Socioeconomic History    Marital status:      Spouse name: Not on file    Number of children: Not on file    Years of education: Not on file    Highest education level: Not on file   Occupational History    Not on file   Tobacco Use    Smoking status: Never Smoker    Smokeless tobacco: Never Used   Vaping Use    Vaping Use: Never used   Substance and Sexual Activity    Alcohol use: Yes     Comment: social    Drug use: No    Sexual activity: Not Currently   Other Topics Concern    Not on file   Social History Narrative    Not on file     Social Determinants of Health     Financial Resource Strain:     Difficulty of Paying Living Expenses: Not on file   Food Insecurity: No Food Insecurity    Worried About [address] [employer] [employer] in the Last Year: Never true    [address] [facility] in the Last Year: Never true   Transportation Needs:     Lack of Transportation (Medical): Not on file    Lack of Transportation (Non-Medical):  Not on file   Physical Activity:     Days of Exercise per Week: Not on file    Minutes of Exercise per Session: Not on file   Stress: No Stress Concern Present    Feeling of Stress : Not at all   Social Connections:     Frequency of Communication with Friends and Family: Not on file    Frequency of Social Gatherings with Friends and Family: Not on file    Attends Bahai Services: Not on file    Active Member of Clubs or Organizations: Not on file    Attends Club or Organization Meetings: Not on file    Marital Status: Not on file   Intimate Partner Violence:     Fear of Current or Ex-Partner: Not on file    Emotionally Abused: Not on file    Physically Abused: Not on file    Sexually Abused: Not on file   Housing Stability:     Unable to Pay for Housing in the Last Year: Not on file    Number of [place] in the Last Year: Not on file    Unstable Housing in the Last Year: Not on file     Family History   Problem Relation Age of Onset    Early Death Mother            Physical Exam:    Ht 5' 5\" (1.651 m)   Wt 215 lb (97.5 kg)   BMI 35.78 kg/m²     GENERAL: alert, appears stated age, cooperative, no acute distress    HEENT: Head is normocephalic, atraumatic. PERRLA. SKIN: Clean, dry, intact. There is not any cellulitis or cutaneous lesions noted in the upper extremities    PULMONARY: breathing is regular and unlabored, no acute distress    CV: The bilateral upper and lower extremities are warm and well-perfused with brisk capillary refill. 2+ pulses UE and LE bilateral.     PSYCHIATRY: Pleasant mood, appropriate behavior, follows commands    NEURO: Sensation is intact distally with light touch with no alteration. Motor exam of the upper extremities show elbow flexion and extension, wrist flexion and extension, and finger abduction grossly intact 5/5. Upper extremity reflexes are bilaterally symmetrical and within normal limits. LYMPH: No lymphedema present distally in upper or lower extremity. MUSCULOSKELETAL:  Examination of right knee reveals well-healed incisional scar of total knee replacement as well as right lateral thigh with removal of sutures and staples. No signs of infection or cellulitic process. Range of motion is limited but 5- 100 degrees. There is no varus valgus instability. Nontender along fracture. Imaging:  US DUP LOWER EXTREMITY RIGHT REBECCA    Result Date: 4/6/2022  EXAMINATION: DUPLEX VENOUS ULTRASOUND OF THE RIGHT LOWER EXTREMITY 4/6/2022 8:59 am TECHNIQUE: Duplex ultrasound using B-mode/gray scaled imaging and Doppler spectral analysis and color flow was obtained of the deep venous structures of the right lower extremity. COMPARISON: None.  HISTORY: ORDERING SYSTEM PROVIDED HISTORY: Right calf pain TECHNOLOGIST PROVIDED HISTORY: Reason for exam:->calf pain, recent femur fracture What reading provider will be dictating this exam?->CRC FINDINGS: The visualized veins of the right lower extremity are patent and free of echogenic thrombus. The veins demonstrate good compressibility with normal color flow study and spectral analysis. No evidence of DVT in the right lower extremity. Yvette Adam was seen today for leg pain. Diagnoses and all orders for this visit:    Periprosthetic fracture around internal prosthetic right knee joint, subsequent encounter        Patient seen and examined. Imaging reviewed with patient in detail. Natural history and course discussed with patient in long discussion  Treatment options discussed with patient in detail including risks and benefits. Patient should do well with continued conservative management. Patient will continue postoperative protocol set forth by operating surgeon. Patient was educated about implant and long-term natural history and healing process and times. Patient will continue with physical therapy. Follow-up in 3 to 4 weeks with new x-rays.       Jimmy Connor DO

## 2022-04-22 ENCOUNTER — TREATMENT (OUTPATIENT)
Dept: PHYSICAL THERAPY | Age: 67
End: 2022-04-22
Payer: MEDICARE

## 2022-04-22 DIAGNOSIS — M97.8XXA PERIPROSTHETIC FRACTURE OF SHAFT OF FEMUR: Primary | ICD-10-CM

## 2022-04-22 DIAGNOSIS — Z96.649 PERIPROSTHETIC FRACTURE OF SHAFT OF FEMUR: Primary | ICD-10-CM

## 2022-04-22 PROCEDURE — 97530 THERAPEUTIC ACTIVITIES: CPT

## 2022-04-22 PROCEDURE — 97110 THERAPEUTIC EXERCISES: CPT

## 2022-04-22 NOTE — PROGRESS NOTES
Physical Therapy Daily Treatment Note    Date: 2022  Patient Name: Freddy Villeda  :    MRN: [id number]  DOInjury: 3/12/22  DOSx: 3/14/22  Referring Provider:    Dr. Pio Culp [facility]; [address]  [place], [address] [place] [address] Diagnosis:   Z87.81 (ICD-10-CM) - Personal history of (healed) traumatic fracture  M97. 8XXA, Z96.649 (ICD-10-CM) - Periprosthetic fracture of shaft of femur  Outcome Measure:   LEFS       X = TO BE PERFORMED NEXT VISIT  > = PROGRESS TO THIS    S: Pt reports no new complaints  O: Discussed anatomy, physiology, body mechanics, principles of loading, and progressive loading/activity. Reviewed home exercise program extensively; written copy provided. Access Code: [email or web address]  URL: [email or web address]  Date: 2022  Prepared by: Odessa Ashton    Exercises  Seated Heel Slide - 2 x daily - 7 x weekly - 2 sets - 25 reps  Seated Long Arc Quad - 2 x daily - 7 x weekly - 2 sets - 15 reps - 5 sec hold  Active Straight Leg Raise with Quad Set - 2 x daily - 7 x weekly - 2 sets - 15 reps - 2 sec hold      Time 7623-8830     Visit   2-3x 6 weeks Repeat outcome measure at mid point and end. Pain Pain 3/10     ROM      Modalities         MO   Manual            Stretch      Towel / strap DF, INV, EV   TE      TE   Exercise      Nu Step L5 x 10 min Seat 10    Heel slides TE   QS TE   SLR  TE   SAQ  TE   LAQ 2# 3 x 10 hold 5s     Seated march      Standing marching  2 X 20      Standing hip abduction  2 x 20      [] TG  [] Leg Press 2-leg   TE   [] TG  [] Leg Press 1-leg   TE   Hamstring Curl Machine   TE   Knee Extension Machine   TE   Step-ups - FWD 4\" 2 x 15   TA   Step-ups - LAT 4\" 2 x 15  TA   Step-ups - BWD   TA   Calf Raises   TA         Steps    TA   Ambulation (2 x 45ft )x 2   w/ straight cane  TA               A:  Tolerated well. Fatigued following PT. Able to increase ambulation distance with cane in clinic.  Large, functional, dynamic, global movements used to build strength, balance, endurance, and flexibility and to improve physical performance.   P: Continue with rehab plan  Chicho Splinter, PTA    Treatment Charges: Mins Units   Initial Evaluation     Re-Evaluation     Ther Exercise         TE 10 1   Manual Therapy     MT     Ther Activities        TA 30 2   Gait Training          GT     Neuro Re-education NR     Modalities     Non-Billable Service Time     Other     Total Time/Units 40 3

## 2022-04-26 ENCOUNTER — TREATMENT (OUTPATIENT)
Dept: PHYSICAL THERAPY | Age: 67
End: 2022-04-26
Payer: MEDICARE

## 2022-04-26 DIAGNOSIS — Z96.649 PERIPROSTHETIC FRACTURE OF SHAFT OF FEMUR: Primary | ICD-10-CM

## 2022-04-26 DIAGNOSIS — M97.8XXA PERIPROSTHETIC FRACTURE OF SHAFT OF FEMUR: Primary | ICD-10-CM

## 2022-04-26 DIAGNOSIS — Z96.649 PERI-PROSTHETIC FRACTURE OF SHAFT OF FEMUR: ICD-10-CM

## 2022-04-26 DIAGNOSIS — M97.8XXA PERI-PROSTHETIC FRACTURE OF SHAFT OF FEMUR: ICD-10-CM

## 2022-04-26 PROCEDURE — 97110 THERAPEUTIC EXERCISES: CPT

## 2022-04-26 PROCEDURE — 97530 THERAPEUTIC ACTIVITIES: CPT

## 2022-04-26 NOTE — PROGRESS NOTES
Physical Therapy Daily Treatment Note    Date: 2022  Patient Name: Gilbert Franco  :    MRN: [id number]  DOInjury: 3/12/22  DOSx: 3/14/22  Referring Provider:    Dr. Pedro Menjivar   [place]; [address]  [place], [address] [facility]      Medical Diagnosis:   Z87.81 (ICD-10-CM) - Personal history of (healed) traumatic fracture  M97. 8XXA, Z96.649 (ICD-10-CM) - Periprosthetic fracture of shaft of femur  Outcome Measure:   LEFS       X = TO BE PERFORMED NEXT VISIT  > = PROGRESS TO THIS    S: Pt reports no pain; no new complaints    O: Discussed anatomy, physiology, body mechanics, principles of loading, and progressive loading/activity. Reviewed home exercise program extensively; written copy provided. Access Code: [email or web address]  URL: [email or web address]  Date: 2022  Prepared by: Judith Shearer    Exercises  Seated Heel Slide - 2 x daily - 7 x weekly - 2 sets - 25 reps  Seated Long Arc Quad - 2 x daily - 7 x weekly - 2 sets - 15 reps - 5 sec hold  Active Straight Leg Raise with Quad Set - 2 x daily - 7 x weekly - 2 sets - 15 reps - 2 sec hold      Time 2084-6545     Visit -  2-3x 6 weeks Repeat outcome measure at mid point and end. Pain Pain 3/10     ROM      Modalities         MO   Manual            Stretch      Towel / strap DF, INV, EV   TE      TE   Exercise      Nu Step L5 x 10 min Seat 10    Heel slides TE   QS TE   SLR  TE   SAQ  TE   LAQ 2# 3 x 10 hold 5s     Seated march      Standing marching  2 X 20   TA   Standing hip abduction  2 x 20   TA   [] TG  [] Leg Press 2-leg   TE   [] TG  [] Leg Press 1-leg   TE   Hamstring Curl Machine   TE   Knee Extension Machine   TE   Step-ups - FWD 4\" 2 x 15   TA   Step-ups - LAT 4\" 2 x 15  TA   Step-ups - BWD   TA   Calf Raises   TA         Steps    TA   Ambulation X 150ft with SC  TA               A:  Tolerated well. Fatigued following PT. Able to increase ambulation distance using straight cane in clinic.  Large, functional, dynamic, global movements used to build strength, balance, endurance, and flexibility and to improve physical performance.    P: Continue with rehab plan  Cresencio Roberts PTA    Treatment Charges: Mins Units   Initial Evaluation     Re-Evaluation     Ther Exercise         TE 10 1   Manual Therapy     MT     Ther Activities        TA 30 2   Gait Training          GT     Neuro Re-education NR     Modalities     Non-Billable Service Time     Other     Total Time/Units 40 3

## 2022-04-29 ENCOUNTER — TELEPHONE (OUTPATIENT)
Dept: PHYSICAL THERAPY | Age: 67
End: 2022-04-29

## 2022-04-29 NOTE — TELEPHONE ENCOUNTER
Date: 2022       Patient Name: Rozina Apple  :       MRN: [id number]    Patient cancelled appt today. Is scheduled for next week.     Alysa Hwang PTA

## 2022-05-03 ENCOUNTER — TREATMENT (OUTPATIENT)
Dept: PHYSICAL THERAPY | Age: 67
End: 2022-05-03
Payer: MEDICARE

## 2022-05-03 DIAGNOSIS — Z96.649 PERIPROSTHETIC FRACTURE OF SHAFT OF FEMUR: Primary | ICD-10-CM

## 2022-05-03 DIAGNOSIS — M97.8XXA PERIPROSTHETIC FRACTURE OF SHAFT OF FEMUR: Primary | ICD-10-CM

## 2022-05-03 DIAGNOSIS — M97.8XXA PERI-PROSTHETIC FRACTURE OF SHAFT OF FEMUR: ICD-10-CM

## 2022-05-03 DIAGNOSIS — Z96.649 PERI-PROSTHETIC FRACTURE OF SHAFT OF FEMUR: ICD-10-CM

## 2022-05-03 PROCEDURE — 97530 THERAPEUTIC ACTIVITIES: CPT

## 2022-05-03 PROCEDURE — 97110 THERAPEUTIC EXERCISES: CPT

## 2022-05-03 NOTE — PROGRESS NOTES
Physical Therapy Daily Treatment Note    Date: 5/3/2022  Patient Name: Tremaine Rosa  : 1805   MRN: [id number]  DOInjury: 3/12/22  DOSx: 3/14/22  Referring Provider:    Dr. Yehuda Tirado [facility]; [address]  [place], [address] [facility]      Medical Diagnosis:   Z87.81 (ICD-10-CM) - Personal history of (healed) traumatic fracture  M97. 8XXA, Z96.649 (ICD-10-CM) - Periprosthetic fracture of shaft of femur  Outcome Measure:   LEFS       X = TO BE PERFORMED NEXT VISIT  > = PROGRESS TO THIS    S: Pt reports having some shine pain which she states she had prior to injury secondary to fibular component of total knee. Feels she is 60%-70% improved since starting PT. Continues to use walker; wants to be able to ambulate short distances with cane and longer distances using rollator but uncomfortable with both at this time. O: Discussed anatomy, physiology, body mechanics, principles of loading, and progressive loading/activity. Reviewed home exercise program extensively; written copy provided. Access Code: [email or web address]  URL: [email or web address]  Date: 2022  Prepared by: Vandana Valles    Exercises  Seated Heel Slide - 2 x daily - 7 x weekly - 2 sets - 25 reps  Seated Long Arc Quad - 2 x daily - 7 x weekly - 2 sets - 15 reps - 5 sec hold  Active Straight Leg Raise with Quad Set - 2 x daily - 7 x weekly - 2 sets - 15 reps - 2 sec hold      Time 6969-3344     Visit   2-3x 6 weeks Repeat outcome measure at mid point and end.     Pain Pain 2/10     ROM      Modalities         MO   Manual            Stretch      Towel / strap DF, INV, EV   TE      TE   Exercise      Nu Step L5 x 10 min Seat 10    Heel slides TE   QS TE   SLR  TE   SAQ  TE   LAQ 2# 3 x 10 hold 5s     Seated march      Standing marching  2 X 20   TA   Standing hip abduction  2 x 20   TA   [] TG  [] Leg Press 2-leg   TE   [] TG  [] Leg Press 1-leg   TE   Hamstring Curl Machine   TE   Knee Extension Machine   TE   Step-ups - FWD  TA Step-ups - LAT  TA   Step-ups - BWD   TA   Calf Raises   TA         Steps    TA   Ambulation X 150ft with SC  TA               A:  Tolerated well.  Unable to perform steps today secondary to shin pain  P: Continue with rehab plan  Teodoro Fernandez PTA    Treatment Charges: Mins Units   Initial Evaluation     Re-Evaluation     Ther Exercise         TE 10 1   Manual Therapy     MT     Ther Activities        TA 30 2   Gait Training          GT     Neuro Re-education NR     Modalities     Non-Billable Service Time     Other     Total Time/Units 40 3

## 2022-05-06 ENCOUNTER — TREATMENT (OUTPATIENT)
Dept: PHYSICAL THERAPY | Age: 67
End: 2022-05-06
Payer: MEDICARE

## 2022-05-06 DIAGNOSIS — Z96.649 PERI-PROSTHETIC FRACTURE OF SHAFT OF FEMUR: ICD-10-CM

## 2022-05-06 DIAGNOSIS — M97.8XXA PERIPROSTHETIC FRACTURE OF SHAFT OF FEMUR: Primary | ICD-10-CM

## 2022-05-06 DIAGNOSIS — Z96.649 PERIPROSTHETIC FRACTURE OF SHAFT OF FEMUR: Primary | ICD-10-CM

## 2022-05-06 DIAGNOSIS — M97.8XXA PERI-PROSTHETIC FRACTURE OF SHAFT OF FEMUR: ICD-10-CM

## 2022-05-06 DIAGNOSIS — M97.11XD PERIPROSTHETIC FRACTURE AROUND INTERNAL PROSTHETIC RIGHT KNEE JOINT, SUBSEQUENT ENCOUNTER: Primary | ICD-10-CM

## 2022-05-06 PROCEDURE — 97530 THERAPEUTIC ACTIVITIES: CPT

## 2022-05-06 PROCEDURE — 97110 THERAPEUTIC EXERCISES: CPT

## 2022-05-06 NOTE — PROGRESS NOTES
Physical Therapy Daily Treatment Note    Date: 2022  Patient Name: Robby Vines  :    MRN: [id number]  DOInjury: 3/12/22  DOSx: 3/14/22  Referring Provider:    Dr. Jean Carlos Ford; [address]  [place], [address] [facility]      Medical Diagnosis:   Z87.81 (ICD-10-CM) - Personal history of (healed) traumatic fracture  M97. 8XXA, Z96.649 (ICD-10-CM) - Periprosthetic fracture of shaft of femur  Outcome Measure:   LEFS       X = TO BE PERFORMED NEXT VISIT  > = PROGRESS TO THIS    S: [facility] is better today, less walking past couple days. States she planted window boxes pots yesterday and felt good. States 80% improvement to prior function since beginning PT. Still unable to ambulate without a use of AD.    O: Discussed anatomy, physiology, body mechanics, principles of loading, and progressive loading/activity. Reviewed home exercise program extensively; written copy provided. Access Code: [email or web address]  URL: [email or web address]  Date: 2022  Prepared by: Areta Klinefelter    Exercises  Seated Heel Slide - 2 x daily - 7 x weekly - 2 sets - 25 reps  Seated Long Arc Quad - 2 x daily - 7 x weekly - 2 sets - 15 reps - 5 sec hold  Active Straight Leg Raise with Quad Set - 2 x daily - 7 x weekly - 2 sets - 15 reps - 2 sec hold      Time 3404-7187     Visit   2-3x 6 weeks Repeat outcome measure at mid point and end.     Pain Pain 0/10     ROM      Modalities         MO   Manual            Stretch      Towel / strap DF, INV, EV   TE      TE   Exercise      Nu Step L5 x 10 min Seat 10    Heel slides TE   QS TE   SLR  TE   SAQ  TE   LAQ 2# 2 x 20     Seated march      Standing marching  2 X 20   TA   Standing hip abduction  2 x 20   TA         Ambulation in ll bars  X 6 no UE support           [] TG  [] Leg Press 2-leg   TE   [] TG  [] Leg Press 1-leg   TE   Hamstring Curl Machine   TE   Knee Extension Machine   TE   Step-ups - FWD 4\" 2 x 15   TA   Step-ups - LAT 4\" 2 x 15  TA   Step-ups - BWD   TA Calf Raises   TA         Steps    TA   Ambulation 2 X 150ft with SC  TA               A:  Tolerated well. Ambulated in ll bars without UE support. Instructed in and practiced walk through gait with straight cane, felt good.   P: Continue with rehab plan  Chloe Do PTA    Treatment Charges: Mins Units   Initial Evaluation     Re-Evaluation     Ther Exercise         TE 10 1   Manual Therapy     MT     Ther Activities        TA 30 2   Gait Training          GT     Neuro Re-education NR     Modalities     Non-Billable Service Time     Other     Total Time/Units 40 3

## 2022-05-07 DIAGNOSIS — E03.9 HYPOTHYROIDISM (ACQUIRED): ICD-10-CM

## 2022-05-10 ENCOUNTER — TREATMENT (OUTPATIENT)
Dept: PHYSICAL THERAPY | Age: 67
End: 2022-05-10
Payer: MEDICARE

## 2022-05-10 DIAGNOSIS — Z96.649 PERIPROSTHETIC FRACTURE OF SHAFT OF FEMUR: Primary | ICD-10-CM

## 2022-05-10 DIAGNOSIS — M97.8XXA PERI-PROSTHETIC FRACTURE OF SHAFT OF FEMUR: ICD-10-CM

## 2022-05-10 DIAGNOSIS — Z96.649 PERI-PROSTHETIC FRACTURE OF SHAFT OF FEMUR: ICD-10-CM

## 2022-05-10 DIAGNOSIS — M97.8XXA PERIPROSTHETIC FRACTURE OF SHAFT OF FEMUR: Primary | ICD-10-CM

## 2022-05-10 PROCEDURE — 97110 THERAPEUTIC EXERCISES: CPT

## 2022-05-10 PROCEDURE — 97530 THERAPEUTIC ACTIVITIES: CPT

## 2022-05-10 RX ORDER — LEVOTHYROXINE SODIUM 0.2 MG/1
TABLET ORAL
Qty: 132 TABLET | Refills: 1 | Status: SHIPPED
Start: 2022-05-10 | End: 2022-05-23

## 2022-05-10 NOTE — PROGRESS NOTES
Physical Therapy Daily Treatment Note    Date: 5/10/2022  Patient Name: Deacon Johnson  :    MRN: [id number]  DOInjury: 3/12/22  DOSx: 3/14/22  Referring Provider:    Dr. Artemio Ford; [address]  [place], [address] [place] [address]      Medical Diagnosis:   Z87.81 (ICD-10-CM) - Personal history of (healed) traumatic fracture  M97. 8XXA, Z96.649 (ICD-10-CM) - Periprosthetic fracture of shaft of femur  Outcome Measure:   LEFS       X = TO BE PERFORMED NEXT VISIT  > = PROGRESS TO THIS    S: Manav scanlon is better today, less walking past couple days. States she planted window boxes pots yesterday and felt good. States 80% improvement to prior function since beginning PT. Still unable to ambulate without a use of AD.    O: Discussed anatomy, physiology, body mechanics, principles of loading, and progressive loading/activity. Reviewed home exercise program extensively; written copy provided. Access Code: [email or web address]  URL: [email or web address]  Date: 2022  Prepared by: Hannah Higgins    Exercises  Seated Heel Slide - 2 x daily - 7 x weekly - 2 sets - 25 reps  Seated Long Arc Quad - 2 x daily - 7 x weekly - 2 sets - 15 reps - 5 sec hold  Active Straight Leg Raise with Quad Set - 2 x daily - 7 x weekly - 2 sets - 15 reps - 2 sec hold      Time 6348-9146     Visit   2-3x 6 weeks Repeat outcome measure at mid point and end.     Pain Pain 0/10     ROM      Modalities         MO   Manual            Stretch      Towel / strap DF, INV, EV   TE      TE   Exercise      Nu Step L5 x 10 min Seat 10    Heel slides TE   QS TE   SLR  TE   SAQ  TE   LAQ 2# 2 x 20     Seated march      Standing marching 1 x 20   2 X 20 one hand support  TA   Standing hip abduction 1 x 20    2 x 20 one hand support  TA         Ambulation in ll bars  X 6 no UE support           [] TG  [] Leg Press 2-leg   TE   [] TG  [] Leg Press 1-leg   TE   Hamstring Curl Machine   TE   Knee Extension Machine   TE   Step-ups - FWD 4\" x 20   TA Step-ups - LAT 4\" x 10 Provoked shin pain; D/Cd TA   Step-ups - BWD   TA   Calf Raises   TA         Steps    TA   Ambulation 2 X 150ft with SC  TA               A:  Tolerated well. Had shin pain during step up so discontinued today.    P: Continue with rehab plan  Cresencio Roberts PTA    Treatment Charges: Mins Units   Initial Evaluation     Re-Evaluation     Ther Exercise         TE 10 1   Manual Therapy     MT     Ther Activities        TA 30 2   Gait Training          GT     Neuro Re-education NR     Modalities     Non-Billable Service Time     Other     Total Time/Units 40 3

## 2022-05-11 ENCOUNTER — OFFICE VISIT (OUTPATIENT)
Dept: ORTHOPEDIC SURGERY | Age: 67
End: 2022-05-11
Payer: MEDICARE

## 2022-05-11 VITALS — TEMPERATURE: 98 F | BODY MASS INDEX: 35.82 KG/M2 | HEIGHT: 65 IN | WEIGHT: 215 LBS

## 2022-05-11 DIAGNOSIS — Z71.82 EXERCISE COUNSELING: ICD-10-CM

## 2022-05-11 DIAGNOSIS — M97.11XD PERIPROSTHETIC FRACTURE AROUND INTERNAL PROSTHETIC RIGHT KNEE JOINT, SUBSEQUENT ENCOUNTER: Primary | ICD-10-CM

## 2022-05-11 DIAGNOSIS — Z91.81 HISTORY OF RECENT FALL: ICD-10-CM

## 2022-05-11 DIAGNOSIS — E03.9 HYPOTHYROIDISM (ACQUIRED): ICD-10-CM

## 2022-05-11 DIAGNOSIS — Z78.9 DECREASED INDEPENDENCE WITH ACTIVITIES OF DAILY LIVING: ICD-10-CM

## 2022-05-11 LAB — TSH SERPL DL<=0.05 MIU/L-ACNC: 0.05 UIU/ML (ref 0.27–4.2)

## 2022-05-11 PROCEDURE — 1036F TOBACCO NON-USER: CPT | Performed by: ORTHOPAEDIC SURGERY

## 2022-05-11 PROCEDURE — 4040F PNEUMOC VAC/ADMIN/RCVD: CPT | Performed by: ORTHOPAEDIC SURGERY

## 2022-05-11 PROCEDURE — 1090F PRES/ABSN URINE INCON ASSESS: CPT | Performed by: ORTHOPAEDIC SURGERY

## 2022-05-11 PROCEDURE — 3017F COLORECTAL CA SCREEN DOC REV: CPT | Performed by: ORTHOPAEDIC SURGERY

## 2022-05-11 PROCEDURE — 1123F ACP DISCUSS/DSCN MKR DOCD: CPT | Performed by: ORTHOPAEDIC SURGERY

## 2022-05-11 PROCEDURE — 99214 OFFICE O/P EST MOD 30 MIN: CPT | Performed by: ORTHOPAEDIC SURGERY

## 2022-05-11 PROCEDURE — G8427 DOCREV CUR MEDS BY ELIG CLIN: HCPCS | Performed by: ORTHOPAEDIC SURGERY

## 2022-05-11 PROCEDURE — G8400 PT W/DXA NO RESULTS DOC: HCPCS | Performed by: ORTHOPAEDIC SURGERY

## 2022-05-11 PROCEDURE — G8417 CALC BMI ABV UP PARAM F/U: HCPCS | Performed by: ORTHOPAEDIC SURGERY

## 2022-05-11 NOTE — PROGRESS NOTES
Chief Complaint   Patient presents with    Knee Pain     right femur ORIF f/u doing well         HPI:    Patient is 79 y.o. female here today for continued follow-up of Right femur ORIF from injury on 3/12/2022. She missed bottom step and fell. Surgery on 3/14/2022 in [place]. She was there for additional 2 weeks before coming home. Has been in PT for the past few weeks and doing well, not much pain. Patient is ambulating with walker and actually has been doing physical therapy at this time. On follow-up today, patient states she is doing much better overall ambulating with walker. ROS:    Skin: (-) rash,(-) psoriasis,(-) eczema, (-)skin cancer. Neurologic: (-)numbness, (-)tingling, (-)headaches, (-) LOC. Cardiovascular: (-) Chest pain, (-) swelling in legs/feet, (-) SOB, (-) cramping in legs/feet with walking.     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 2019    Fracture of right lower extremity     Hyperlipidemia     Hypertension     Hypothyroidism     Malignant neoplasm of ovary ([facility] [address] 2001    Obesity     Positive FIT (fecal immunochemical test) 04/26/2018    Type II or unspecified type diabetes mellitus without mention of complication, not stated as uncontrolled     Unspecified vitamin D deficiency 01/14/2016     Past Surgical History:   Procedure Laterality Date    COLONOSCOPY      HYSTERECTOMY      JOINT REPLACEMENT      total knees bilateral    KNEE SURGERY Bilateral 2006/ 2009    IA COLONOSCOPY FLX DX W/COLLJ SPEC WHEN PFRMD N/A 5/30/2018    COLONOSCOPY performed by Taqueria Steward MD at [place] [address]       Current Outpatient Medications:     levothyroxine (SYNTHROID) 200 MCG tablet, take 2 tablets by mouth once daily [address] then take 1 tablet once daily ON OTHER DAYS, Disp: 132 tablet, Rfl: 1    enoxaparin (LOVENOX) 30 MG/0.3ML injection, INJECT 0.3 ML UNDER THE SKIN TWICE DAILY FOR 35 DAYS, Disp: , Rfl:     clindamycin (CLEOCIN T) 1 % lotion, apply to face twice a day, Disp: , Rfl:     CALCIUM 600+D3 600-800 MG-UNIT TABS, TAKE 1 TABLET BY MOUTH TWICE DAILY WITH BREAKFAST AND SUPPER, Disp: , Rfl:     Ascorbic Acid (VITAMIN C) 1000 MG tablet, TAKE 1 TABLET BY MOUTH DAILY FOR 30 DAYS, Disp: , Rfl:     metFORMIN (GLUCOPHAGE) 1000 MG tablet, Take 1 tablet by mouth 2 times daily (with meals), Disp: 180 tablet, Rfl: 1    losartan-hydroCHLOROthiazide (HYZAAR) 100-25 MG per tablet, Take 1 tablet by mouth daily, Disp: 90 tablet, Rfl: 1    indomethacin (INDOCIN SR) 75 MG extended release capsule, Take 1 capsule by mouth 2 times daily (with meals), Disp: 180 capsule, Rfl: 3    levothyroxine (SYNTHROID) 150 MCG tablet, Take 1 tablet by mouth daily Monday-Thursday, Disp: 90 tablet, Rfl: 1    Blood Glucose Monitoring Suppl (ONE TOUCH ULTRA 2) w/Device KIT, Check blood sugar qam, Disp: 1 kit, Rfl: 0    ONE TOUCH ULTRASOFT LANCETS MISC, Check blood sugar every morning, Disp: 150 each, Rfl: 3    blood glucose test strips (ONETOUCH ULTRA) strip, Check blood sugar every morning, Disp: 150 each, Rfl: 3    atorvastatin (LIPITOR) 20 MG tablet, TAKE 1 TABLET DAILY, Disp: 90 tablet, Rfl: 3    allopurinol (ZYLOPRIM) 100 MG tablet, Take 1 tablet by mouth daily, Disp: 90 tablet, Rfl: 1    ketoconazole (NIZORAL) 2 % cream, Apply topically daily. , Disp: 60 g, Rfl: 5    metroNIDAZOLE, TOPICAL, 0.75 % LOTN, Apply to face BID, Disp: 3 Bottle, Rfl: 3    triamcinolone (KENALOG) 0.1 % cream, Apply bid to affected areas prn, Disp: 1200 g, Rfl: 3    vitamin D (ERGOCALCIFEROL) 51583 units CAPS capsule, Take 1 capsule by mouth once a week, Disp: 12 capsule, Rfl: 1    Handicap Placard [facility], by Does not apply route Unable to ambulate more than 60 feet without difficulty Expires 10/31/2022, Disp: 1 each, Rfl: 0  Allergies   Allergen Reactions    Iodine      fish allergy     Social History     Socioeconomic History    Marital status:      Spouse name: Not on file    Number of children: Not on file    Years of education: Not on file    Highest education level: Not on file   Occupational History    Not on file   Tobacco Use    Smoking status: Never Smoker    Smokeless tobacco: Never Used   Vaping Use    Vaping Use: Never used   Substance and Sexual Activity    Alcohol use: Yes     Comment: social    Drug use: No    Sexual activity: Not Currently   Other Topics Concern    Not on file   Social History Narrative    Not on file     Social Determinants of Health     Financial Resource Strain:     Difficulty of Paying Living Expenses: Not on file   Food Insecurity: No Food Insecurity    Worried About [address] in the Last Year: Never true    [address] [place] in the Last Year: Never true   Transportation Needs:     Lack of Transportation (Medical): Not on file    Lack of Transportation (Non-Medical):  Not on file   Physical Activity:     Days of Exercise per Week: Not on file    Minutes of Exercise per Session: Not on file   Stress: No Stress Concern Present    Feeling of Stress : Not at all   Social Connections:     Frequency of Communication with Friends and Family: Not on file    Frequency of Social Gatherings with Friends and Family: Not on file    Attends Gnosticist Services: Not on file    Active Member of Clubs or Organizations: Not on file    Attends Club or Organization Meetings: Not on file    Marital Status: Not on file   Intimate Partner Violence:     Fear of Current or Ex-Partner: Not on file    Emotionally Abused: Not on file    Physically Abused: Not on file    Sexually Abused: Not on file   Housing Stability:     Unable to Pay for Housing in the Last Year: Not on file    Number of [place] in the Last Year: Not on file    Unstable Housing in the Last Year: Not on file     Family History   Problem Relation Age of Onset    Early Death Mother            Physical Exam:    Temp 98 °F (36.7 °C)   Ht 5' 5\" (1.651 m)   Wt 215 lb (97.5 kg)   BMI 35.78 kg/m²     GENERAL: alert, appears stated age, cooperative, no acute distress    HEENT: Head is normocephalic, atraumatic. PERRLA. HEENT: Head is normocephalic, atraumatic. PERRLA. SKIN: Clean, dry, intact. There is not any cellulitis or cutaneous lesions noted in the lower extremities     PULMONARY: breathing is regular and unlabored, no acute distress     CV: The bilateral lower extremities are warm and well-perfused with brisk capillary refill. 2+ pulses LE bilateral.     ABDOMINAL: Nontender, nondistended     PSYCHIATRY: Pleasant mood, appropriate behavior, follows commands     NEURO: Sensation is intact distally with light touch with no alteration. Motor exam of the lower extremities show quadriceps, hamstrings, foot dorsiflexion and plantarflexion grossly intact 5/5. LYMPH: No lymphedema present distally in upper or lower extremity. MUSCULOSKELETAL:  Examination of right knee reveals well-healed incisional scar of total knee replacement as well as right lateral thigh with removal of sutures and staples. No signs of infection or cellulitic process. Range of motion is limited but 5- 100 degrees. There is no varus valgus instability. Nontender along fracture. Mild improvement since last visit    Imaging:  US DUP LOWER EXTREMITY RIGHT REBECCA    Result Date: 4/6/2022  EXAMINATION: DUPLEX VENOUS ULTRASOUND OF THE RIGHT LOWER EXTREMITY 4/6/2022 8:59 am TECHNIQUE: Duplex ultrasound using B-mode/gray scaled imaging and Doppler spectral analysis and color flow was obtained of the deep venous structures of the right lower extremity. COMPARISON: None.  HISTORY: ORDERING SYSTEM PROVIDED HISTORY: Right calf pain TECHNOLOGIST PROVIDED HISTORY: Reason for exam:->calf pain, recent femur fracture What reading provider will be dictating this exam?->CRC FINDINGS: The visualized veins of the right lower extremity are patent and free of echogenic thrombus. The veins demonstrate good compressibility with normal color flow study and spectral analysis. No evidence of DVT in the right lower extremity. Emmanuel Scott was seen today for knee pain. Diagnoses and all orders for this visit:    Periprosthetic fracture around internal prosthetic right knee joint, subsequent encounter  -     Cancel: [facility] External Referral To Physical [facility]       [facility]    History of recent fall    Decreased independence with activities of daily living    Exercise counseling        Patient seen and examined. Imaging reviewed with patient in detail. Natural history and course discussed with patient in long discussion  Treatment options discussed with patient in detail including risks and benefits. Patient should do well with continued conservative management. Patient will continue postoperative protocol set forth by operating surgeon. Patient was educated about implant and long-term natural history and healing process and times. Patient will continue with physical therapy. Follow-up in 3 to 4 weeks with new x-rays. In a 15 minute assessment and discussion, patient was counseled on weight loss, healthy diet, and physical activity relating to this condition. She was educated with options in detail including nutrition, joining a health club/ weight loss program, and use of cardio equipment such as the Arc Trainer and the importance of use as well as range of motion and HEP exercises for weight loss and general health. Neymar Ramirez, DO             25 minutes was spent with patient. 50% or greater was spent counseling the patient.

## 2022-05-13 ENCOUNTER — TREATMENT (OUTPATIENT)
Dept: PHYSICAL THERAPY | Age: 67
End: 2022-05-13
Payer: MEDICARE

## 2022-05-13 DIAGNOSIS — I10 ESSENTIAL HYPERTENSION: ICD-10-CM

## 2022-05-13 DIAGNOSIS — M97.8XXA PERIPROSTHETIC FRACTURE OF SHAFT OF FEMUR: Primary | ICD-10-CM

## 2022-05-13 DIAGNOSIS — Z96.649 PERIPROSTHETIC FRACTURE OF SHAFT OF FEMUR: Primary | ICD-10-CM

## 2022-05-13 PROCEDURE — 97530 THERAPEUTIC ACTIVITIES: CPT | Performed by: PHYSICAL THERAPIST

## 2022-05-13 PROCEDURE — 97110 THERAPEUTIC EXERCISES: CPT | Performed by: PHYSICAL THERAPIST

## 2022-05-13 NOTE — PROGRESS NOTES
Physical Therapy Daily Treatment Note    Date: 2022  Patient Name: Aundrea Maguire  : [birth year]   MRN: [id number]  DOInjury: 3/12/22  DOSx: 3/14/22  Referring Provider:    Dr. Neetu Ford; [address]  [place], [address] [place] [address] Diagnosis:   Z87.81 (ICD-10-CM) - Personal history of (healed) traumatic fracture  M97. 8XXA, Z96.649 (ICD-10-CM) - Periprosthetic fracture of shaft of femur  Outcome Measure:   LEFS       X = TO BE PERFORMED NEXT VISIT  > = PROGRESS TO THIS    S: no pain, reports she is deathly afraid of falling  O: Discussed anatomy, physiology, body mechanics, principles of loading, and progressive loading/activity. Reviewed home exercise program extensively; written copy provided. Access Code: [email or web address]  URL: [email or web address]  Date: 2022  Prepared by: Barbara Hassan    Exercises  Seated Heel Slide - 2 x daily - 7 x weekly - 2 sets - 25 reps  Seated Long Arc Quad - 2 x daily - 7 x weekly - 2 sets - 15 reps - 5 sec hold  Active Straight Leg Raise with Quad Set - 2 x daily - 7 x weekly - 2 sets - 15 reps - 2 sec hold      Time 1000-     Visit -  2-3x 6 weeks Repeat outcome measure at mid point and end.     Pain Pain 0/10     ROM      Modalities         MO   Manual            Stretch      Towel / strap DF, INV, EV   TE      TE   Exercise      Nu Step L5 x 10 min Seat 10    Heel slides TE   QS TE   SLR  TE   SAQ  TE   LAQ 2# 2 x 20     Seated march      Standing marching 1 x 20   2 X 20 one hand support  TA   Standing hip abduction 1 x 20    2 x 20 one hand support  TA         Ambulation in ll bars  X 6 no UE support           [] TG  [] Leg Press 2-leg   TE   [] TG  [] Leg Press 1-leg   TE   Hamstring Curl Machine   TE   Knee Extension Machine   TE   Step-ups - FWD 4\" x 20   TA   Step-ups - LAT 4\" x 10 Provoked shin pain; D/Cd TA   Step-ups - BWD   TA   Calf Raises   TA         Steps    TA   Ambulation 2 X 150ft with SC  TA               A:  Tolerated well. Had shin pain during step up so discontinued today.    P: Continue with rehab plan  [place] Ashton PT    Treatment Charges: Mins Units   Initial Evaluation     Re-Evaluation     Ther Exercise         TE 10 1   Manual Therapy     MT     Ther Activities        TA 30 2   Gait Training          GT     Neuro Re-education NR     Modalities     Non-Billable Service Time     Other     Total Time/Units 40 3

## 2022-05-17 ENCOUNTER — TREATMENT (OUTPATIENT)
Dept: PHYSICAL THERAPY | Age: 67
End: 2022-05-17
Payer: MEDICARE

## 2022-05-17 ENCOUNTER — OFFICE VISIT (OUTPATIENT)
Dept: FAMILY MEDICINE CLINIC | Age: 67
End: 2022-05-17
Payer: MEDICARE

## 2022-05-17 VITALS
WEIGHT: 215 LBS | OXYGEN SATURATION: 98 % | HEIGHT: 65 IN | HEART RATE: 82 BPM | TEMPERATURE: 96.8 F | BODY MASS INDEX: 35.82 KG/M2 | RESPIRATION RATE: 18 BRPM | SYSTOLIC BLOOD PRESSURE: 144 MMHG | DIASTOLIC BLOOD PRESSURE: 82 MMHG

## 2022-05-17 DIAGNOSIS — M97.8XXA PERIPROSTHETIC FRACTURE OF SHAFT OF FEMUR: Primary | ICD-10-CM

## 2022-05-17 DIAGNOSIS — Z96.649 PERIPROSTHETIC FRACTURE OF SHAFT OF FEMUR: Primary | ICD-10-CM

## 2022-05-17 DIAGNOSIS — M79.18 BUTTOCK PAIN: Primary | ICD-10-CM

## 2022-05-17 DIAGNOSIS — Z96.649 PERI-PROSTHETIC FRACTURE OF SHAFT OF FEMUR: ICD-10-CM

## 2022-05-17 DIAGNOSIS — M97.8XXA PERI-PROSTHETIC FRACTURE OF SHAFT OF FEMUR: ICD-10-CM

## 2022-05-17 PROCEDURE — 97530 THERAPEUTIC ACTIVITIES: CPT

## 2022-05-17 PROCEDURE — 3017F COLORECTAL CA SCREEN DOC REV: CPT | Performed by: NURSE PRACTITIONER

## 2022-05-17 PROCEDURE — 1090F PRES/ABSN URINE INCON ASSESS: CPT | Performed by: NURSE PRACTITIONER

## 2022-05-17 PROCEDURE — 1123F ACP DISCUSS/DSCN MKR DOCD: CPT | Performed by: NURSE PRACTITIONER

## 2022-05-17 PROCEDURE — G8417 CALC BMI ABV UP PARAM F/U: HCPCS | Performed by: NURSE PRACTITIONER

## 2022-05-17 PROCEDURE — 4040F PNEUMOC VAC/ADMIN/RCVD: CPT | Performed by: NURSE PRACTITIONER

## 2022-05-17 PROCEDURE — 1036F TOBACCO NON-USER: CPT | Performed by: NURSE PRACTITIONER

## 2022-05-17 PROCEDURE — 97110 THERAPEUTIC EXERCISES: CPT

## 2022-05-17 PROCEDURE — G8427 DOCREV CUR MEDS BY ELIG CLIN: HCPCS | Performed by: NURSE PRACTITIONER

## 2022-05-17 PROCEDURE — G8400 PT W/DXA NO RESULTS DOC: HCPCS | Performed by: NURSE PRACTITIONER

## 2022-05-17 PROCEDURE — 99212 OFFICE O/P EST SF 10 MIN: CPT | Performed by: NURSE PRACTITIONER

## 2022-05-17 RX ORDER — ALLOPURINOL 100 MG/1
TABLET ORAL
Qty: 90 TABLET | Refills: 1 | Status: SHIPPED | OUTPATIENT
Start: 2022-05-17

## 2022-05-17 RX ORDER — LOSARTAN POTASSIUM AND HYDROCHLOROTHIAZIDE 25; 100 MG/1; MG/1
TABLET ORAL
Qty: 90 TABLET | Refills: 1 | Status: SHIPPED
Start: 2022-05-17 | End: 2022-11-01

## 2022-05-17 NOTE — PROGRESS NOTES
Chief Complaint       Mass (2 in above rectum, when she wipes on left side can feel a lump, no pain, no otc meds)    History of Present Illness   Source of history provided by:  patient. Geovanna Elder is a 79 y.o. female who  has a past medical history of Basedow's disease, Chronic nonalcoholic liver disease, Diabetes ([address] Fracture of right lower extremity, Hyperlipidemia, Hypertension, Hypothyroidism, Malignant neoplasm of ovary ([address] Obesity, Positive FIT (fecal immunochemical test), Type II or unspecified type diabetes mellitus without mention of complication, not stated as uncontrolled, and Unspecified vitamin D deficiency. presenting to the walk in clinic for evaluation of sensation of mass or lesion which has been present for months in her right buttock approximately 9 o'clock position from anus. The complaint has been consistent. Denies drainage, increased size or redness. Patient has tried taking no at home for symptomatic relief. Pt is very concerned that with hx of ovarian cancer this could be a tumor. Last colonoscopy was 2018. ROS    Unless otherwise stated in this report or unable to obtain because of the patient's clinical or mental status as evidenced by the medical record, this patients's positive and negative responses for Review of Systems, constitutional, psych, eyes, ENT, cardiovascular, respiratory, gastrointestinal, neurological, genitourinary, musculoskeletal, integument systems and systems related to the presenting problem are either stated in the preceding or were not pertinent or were negative for the symptoms and/or complaints related to the medical problem.     Past Medical History:  has a past medical history of Basedow's disease, Chronic nonalcoholic liver disease, Diabetes ([address] Fracture of right lower extremity, Hyperlipidemia, Hypertension, Hypothyroidism, Malignant neoplasm of ovary ([address] Obesity, Positive FIT (fecal immunochemical test), Type II or unspecified type diabetes mellitus without mention of complication, not stated as uncontrolled, and Unspecified vitamin D deficiency. Past Surgical History:  has a past surgical history that includes Hysterectomy; knee surgery (Bilateral, 2006/ 2009); joint replacement; Colonoscopy; and pr colonoscopy flx dx w/collj spec when pfrmd (N/A, 5/30/2018). Social History:  reports that she has never smoked. She has never used smokeless tobacco. She reports current alcohol use. She reports that she does not use drugs. Family History: family history includes Early Death in her mother. Allergies: Iodine    Physical Exam         VS:  BP (!) 144/82   Pulse 82   Temp 96.8 °F (36 °C)   Resp 18   Ht 5' 5\" (1.651 m)   Wt 215 lb (97.5 kg)   SpO2 98%   BMI 35.78 kg/m²    Oxygen Saturation Interpretation: Normal.  Constitutional:  Alert, development consistent with age. Eyes:  PERRL, EOMI, no discharge or conjunctival injection. GI/Skin:  Normal turgor. Warm, dry, without visible rash, unless noted elsewhere. No hemorrhoid, erythema or cellulitis noted. Deep tissue palpation noted potential lesion in area of concern of patient. Neurological:  Alert and oriented. Motor functions intact. Lab / Imaging Results   (All laboratory and radiology results have been personally reviewed by myself)  Labs:  No results found for this visit on 05/17/22. Imaging: All Radiology results interpreted by Radiologist unless otherwise noted. Narrative   EXAMINATION:   SOFT TISSUE ULTRASOUND       5/17/2022 9:31 am       COMPARISON:   None.       HISTORY:   ORDERING SYSTEM PROVIDED HISTORY: Buttock pain   TECHNOLOGIST PROVIDED HISTORY:   Reason for exam:->pain at 5 O'clock of anus   What reading provider will be dictating this exam?->CRC       FINDINGS:   No discrete cystic or solid mass is identified.  No other significant   surrounding soft tissue abnormality is seen.           Impression   Unremarkable left buttock ultrasound. Assessment / Plan     Impression(s):  Joselyn Martin was seen today for mass. Diagnoses and all orders for this visit:    Buttock pain  -     US SOFT TISSUE LIMITED AREA; Future    Disposition:  Disposition: Discharge to imaging. Pt advised to f/u with PCP for continued management and further care. Patient states understanding is in agreement with this care plan. All questions answered. Fifi Bryant, APRN - CNP    **This report was transcribed using voice recognition software. Every effort was made to ensure accuracy; however, inadvertent computerized transcription errors may be present.

## 2022-05-17 NOTE — PROGRESS NOTES
Physical Therapy Daily Treatment Note    Date: 2022  Patient Name: Tremaine Rosa  : [birth year]   MRN: [id number]  DOInjury: 3/12/22  DOSx: 3/14/22  Referring Provider:    Dr. Yehuda [facility]; [address]  [facility], [address] [place] [address]      Medical Diagnosis:   Z87.81 (ICD-10-CM) - Personal history of (healed) traumatic fracture  M97. 8XXA, Z96.649 (ICD-10-CM) - Periprosthetic fracture of shaft of femur  Outcome Measure:   LEFS       X = TO BE PERFORMED NEXT VISIT  > = PROGRESS TO THIS    S: Pt reports feeling good, no pain but still feels weak and is worried about falling  O: Discussed anatomy, physiology, body mechanics, principles of loading, and progressive loading/activity. Reviewed home exercise program extensively; written copy provided. Access Code: [email or web address]  URL: [email or web address]  Date: 2022  Prepared by: Vandana Valles    Exercises  Seated Heel Slide - 2 x daily - 7 x weekly - 2 sets - 25 reps  Seated Long Arc Quad - 2 x daily - 7 x weekly - 2 sets - 15 reps - 5 sec hold  Active Straight Leg Raise with Quad Set - 2 x daily - 7 x weekly - 2 sets - 15 reps - 2 sec hold      Time 2436-3696     Visit -  2-3x 6 weeks Repeat outcome measure at mid point and end. Pain Pain 0/10     ROM      Modalities         MO   Manual            Stretch      Towel / strap DF, INV, EV   TE      TE   Exercise      Nu Step L5 x 10 min Seat 10    Heel slides TE   QS TE   SLR  TE   SAQ  TE   LAQ 2# 2 x 20     Seated march      Standing marching 2 X 20 one hand support  TA   Standing hip abduction 2 x 20 one hand support  TA         Ambulation in ll bars  X 6 no UE support           [] TG  [] Leg Press 2-leg   TE   [] TG  [] Leg Press 1-leg   TE   Hamstring Curl Machine   TE   Knee Extension Machine   TE   Step-ups - FWD 4\" x 20   TA   Step-ups - LAT 4\" x 20  TA   Step-ups - BWD   TA   Calf Raises   TA         Steps    TA   Ambulation 2 X 150ft with SC  TA               A:  Tolerated well.    P: Continue with rehab plan  Curt Ramon PTA    Treatment Charges: Mins Units   Initial Evaluation     Re-Evaluation     Ther Exercise         TE 10 1   Manual Therapy     MT     Ther Activities        TA 30 2   Gait Training          GT     Neuro Re-education NR     Modalities     Non-Billable Service Time     Other     Total Time/Units 40 3

## 2022-05-20 ENCOUNTER — TREATMENT (OUTPATIENT)
Dept: PHYSICAL THERAPY | Age: 67
End: 2022-05-20
Payer: MEDICARE

## 2022-05-20 DIAGNOSIS — Z96.649 PERI-PROSTHETIC FRACTURE OF SHAFT OF FEMUR: ICD-10-CM

## 2022-05-20 DIAGNOSIS — M97.8XXA PERI-PROSTHETIC FRACTURE OF SHAFT OF FEMUR: ICD-10-CM

## 2022-05-20 DIAGNOSIS — M97.8XXA PERIPROSTHETIC FRACTURE OF SHAFT OF FEMUR: Primary | ICD-10-CM

## 2022-05-20 DIAGNOSIS — Z96.649 PERIPROSTHETIC FRACTURE OF SHAFT OF FEMUR: Primary | ICD-10-CM

## 2022-05-20 PROCEDURE — 97530 THERAPEUTIC ACTIVITIES: CPT

## 2022-05-20 PROCEDURE — 97110 THERAPEUTIC EXERCISES: CPT

## 2022-05-20 NOTE — PROGRESS NOTES
Physical Therapy Daily Treatment Note    Date: 2022  Patient Name: Ricardo Núñez  : [birth year]   MRN: [id number]  DOInjury: 3/12/22  DOSx: 3/14/22  Referring Provider:    Dr. Adah Kenning D. Jaye Nissen [place]; [address]  [place], [address] [place] [address] Diagnosis:   Z87.81 (ICD-10-CM) - Personal history of (healed) traumatic fracture  M97. 8XXA, Z96.649 (ICD-10-CM) - Periprosthetic fracture of shaft of femur  Outcome Measure:   LEFS       X = TO BE PERFORMED NEXT VISIT  > = PROGRESS TO THIS    S: Pt reports right LE shin pain. She is concerned about her prosthesis becoming loose again. States shin pain has not gotten worse since increasing her activity; also states she had shin pain prior to falling  O: Discussed anatomy, physiology, body mechanics, principles of loading, and progressive loading/activity. Reviewed home exercise program extensively; written copy provided. Access Code: [email or web address]  URL: [email or web address]  Date: 2022  Prepared by: Shabbir Liu    Exercises  Seated Heel Slide - 2 x daily - 7 x weekly - 2 sets - 25 reps  Seated Long Arc Quad - 2 x daily - 7 x weekly - 2 sets - 15 reps - 5 sec hold  Active Straight Leg Raise with Quad Set - 2 x daily - 7 x weekly - 2 sets - 15 reps - 2 sec hold      Time 6514-8571     Visit   2-3x 6 weeks Repeat outcome measure at mid point and end.     Pain Pain 0/10     ROM      Modalities         MO   Manual            Stretch      Towel / strap DF, INV, EV   TE      TE   Exercise      Nu Step L5 x 10 min Seat 10    Heel slides TE   QS TE   SLR  TE   SAQ  TE   LAQ 2# 2 x 20     Seated march      Standing marching 2 X 20 one hand support  TA   Standing hip abduction 2 x 20 one hand support  TA   Standing hip flexion 2# 3 x 10     Ambulation in ll bars             [] TG  [] Leg Press 2-leg   TE   [] TG  [] Leg Press 1-leg   TE   Hamstring Curl Machine   TE   Knee Extension Machine   TE   Step-ups - FWD  TA   Step-ups - LAT  TA   Step-ups - BWD   TA   Calf Raises   TA         Steps    TA   Ambulation 2 X 150ft with SC  TA               A: Tolerated well. Discontinued steps today as they aggravate pt shin pain.   P: Continue with rehab plan  Alana Pereira PTA    Treatment Charges: Mins Units   Initial Evaluation     Re-Evaluation     Ther Exercise         TE 10 1   Manual Therapy     MT     Ther Activities        TA 30 2   Gait Training          GT     Neuro Re-education NR     Modalities     Non-Billable Service Time     Other     Total Time/Units 40 3

## 2022-05-23 DIAGNOSIS — E03.9 HYPOTHYROIDISM (ACQUIRED): ICD-10-CM

## 2022-05-23 RX ORDER — LEVOTHYROXINE SODIUM 0.15 MG/1
150 TABLET ORAL
Qty: 90 TABLET | Refills: 1 | Status: SHIPPED
Start: 2022-05-23

## 2022-05-23 RX ORDER — LEVOTHYROXINE SODIUM 0.2 MG/1
200 TABLET ORAL DAILY
Qty: 132 TABLET | Refills: 1
Start: 2022-05-23 | End: 2022-10-24

## 2022-05-24 ENCOUNTER — TELEPHONE (OUTPATIENT)
Dept: PHYSICAL THERAPY | Age: 67
End: 2022-05-24

## 2022-05-26 ENCOUNTER — OFFICE VISIT (OUTPATIENT)
Dept: FAMILY MEDICINE CLINIC | Age: 67
End: 2022-05-26
Payer: MEDICARE

## 2022-05-26 VITALS
SYSTOLIC BLOOD PRESSURE: 138 MMHG | HEART RATE: 78 BPM | DIASTOLIC BLOOD PRESSURE: 78 MMHG | BODY MASS INDEX: 36.99 KG/M2 | OXYGEN SATURATION: 96 % | WEIGHT: 222 LBS | RESPIRATION RATE: 20 BRPM | HEIGHT: 65 IN

## 2022-05-26 DIAGNOSIS — K13.70 ORAL LESION: ICD-10-CM

## 2022-05-26 DIAGNOSIS — E11.65 TYPE 2 DIABETES MELLITUS WITH HYPERGLYCEMIA, WITHOUT LONG-TERM CURRENT USE OF INSULIN (HCC): ICD-10-CM

## 2022-05-26 DIAGNOSIS — E03.9 HYPOTHYROIDISM (ACQUIRED): ICD-10-CM

## 2022-05-26 DIAGNOSIS — E66.01 SEVERE OBESITY (BMI 35.0-39.9) WITH COMORBIDITY (HCC): ICD-10-CM

## 2022-05-26 DIAGNOSIS — Z00.00 INITIAL MEDICARE ANNUAL WELLNESS VISIT: Primary | ICD-10-CM

## 2022-05-26 DIAGNOSIS — R92.1 MAMMOGRAPHIC CALCIFICATION FOUND ON DIAGNOSTIC IMAGING OF BREAST: ICD-10-CM

## 2022-05-26 LAB
CREATININE URINE POCT: NORMAL
HBA1C MFR BLD: 6.5 %
MICROALBUMIN/CREAT 24H UR: NORMAL MG/G{CREAT}
MICROALBUMIN/CREAT UR-RTO: NORMAL

## 2022-05-26 PROCEDURE — G0438 PPPS, INITIAL VISIT: HCPCS | Performed by: FAMILY MEDICINE

## 2022-05-26 PROCEDURE — 3017F COLORECTAL CA SCREEN DOC REV: CPT | Performed by: FAMILY MEDICINE

## 2022-05-26 PROCEDURE — 1123F ACP DISCUSS/DSCN MKR DOCD: CPT | Performed by: FAMILY MEDICINE

## 2022-05-26 PROCEDURE — 83036 HEMOGLOBIN GLYCOSYLATED A1C: CPT | Performed by: FAMILY MEDICINE

## 2022-05-26 PROCEDURE — 82044 UR ALBUMIN SEMIQUANTITATIVE: CPT | Performed by: FAMILY MEDICINE

## 2022-05-26 PROCEDURE — 3044F HG A1C LEVEL LT 7.0%: CPT | Performed by: FAMILY MEDICINE

## 2022-05-26 RX ORDER — ATORVASTATIN CALCIUM 20 MG/1
TABLET, FILM COATED ORAL
Qty: 90 TABLET | Refills: 3 | Status: SHIPPED | OUTPATIENT
Start: 2022-05-26

## 2022-05-26 RX ORDER — INDOMETHACIN 75 MG/1
75 CAPSULE, EXTENDED RELEASE ORAL 2 TIMES DAILY WITH MEALS
Qty: 180 CAPSULE | Refills: 3 | Status: SHIPPED
Start: 2022-05-26 | End: 2022-08-23 | Stop reason: DRUGHIGH

## 2022-05-26 ASSESSMENT — PATIENT HEALTH QUESTIONNAIRE - PHQ9
2. FEELING DOWN, DEPRESSED OR HOPELESS: 0
1. LITTLE INTEREST OR PLEASURE IN DOING THINGS: 0
SUM OF ALL RESPONSES TO PHQ QUESTIONS 1-9: 0
SUM OF ALL RESPONSES TO PHQ QUESTIONS 1-9: 0
SUM OF ALL RESPONSES TO PHQ9 QUESTIONS 1 & 2: 0
SUM OF ALL RESPONSES TO PHQ QUESTIONS 1-9: 0
SUM OF ALL RESPONSES TO PHQ QUESTIONS 1-9: 0

## 2022-05-26 ASSESSMENT — SOCIAL DETERMINANTS OF HEALTH (SDOH): HOW HARD IS IT FOR YOU TO PAY FOR THE VERY BASICS LIKE FOOD, HOUSING, MEDICAL CARE, AND HEATING?: NOT HARD AT ALL

## 2022-05-26 ASSESSMENT — LIFESTYLE VARIABLES
HOW MANY STANDARD DRINKS CONTAINING ALCOHOL DO YOU HAVE ON A TYPICAL DAY: 1 OR 2
HOW OFTEN DO YOU HAVE A DRINK CONTAINING ALCOHOL: MONTHLY OR LESS

## 2022-05-26 NOTE — PROGRESS NOTES
Medicare Annual Wellness Visit    Gerry Soulier is here for Diabetes and Medicare AWV    Assessment & Plan   Initial Medicare annual wellness visit  Type 2 diabetes mellitus with hyperglycemia, without long-term current use of insulin (HCC)  -     POCT glycosylated hemoglobin (Hb A1C)  -     POCT Microalbumin  -     metFORMIN (GLUCOPHAGE) 1000 MG tablet; Take 1 tablet by mouth 2 times daily (with meals), Disp-180 tablet, R-1Normal  -     atorvastatin (LIPITOR) 20 MG tablet; TAKE 1 TABLET DAILY, Disp-90 tablet, R-3Normal  Mammographic calcification found on diagnostic imaging of breast   -     [facility] IESHA DIGITAL DIAGNOSTIC BILATERAL; Future  Hypothyroidism (acquired)  -     TSH; Future  Oral lesion        -     recommended diluted peroxide rinse twice daily to 3 times daily for another week to 2 weeks; avoid hot beverages  Severe obesity (BMI 35.0-39. 9) with comorbidity ([address]         -     Stable; will assess in 6 months     Recommendations for Preventive Services Due: see orders and patient instructions/AVS.  Recommended screening schedule for the next 5-10 years is provided to the patient in written form: see Patient Instructions/AVS.     Return in 6 months (on 11/26/2022) for diabetes. Subjective   Patient is here to follow up on diabetes. Fasting blood sugars:up to 150 Midday blood sugars: not checking. Patient checks blood glucose 0-1 times per day. Patient is following diabetic diet. Patient is a nonsmoker. Patient is taking a daily statin. Recent lab results reviewed with patient, including CMP, CBC, TSH, and lipid panel which are remarkable for low TSH. Last urine microalbumin: today    Patient has noticed a sore on the roof of her mouth. Drinks hot tea daily. Has not healed but has been there for the past month. Patient doing well on current regimen for weight loss. Has been eating smaller portions. Lost 7 pounds since last visit.     Patient's complete Health Risk Assessment and screening values have been reviewed and are found in Flowsheets. The following problems were reviewed today and where indicated follow up appointments were made and/or referrals ordered. Positive Risk Factor Screenings with Interventions:    Fall Risk:  Do you feel unsteady or are you worried about falling? : no  2 or more falls in past year?: no  Fall with injury in past year?: (!) yes     Fall Risk Interventions:    · Home safety tips provided              General Health and ACP:  General  In general, how would you say your health is?: Good  In the past 7 days, have you experienced any of the following: New or Increased Pain, New or Increased Fatigue, Loneliness, Social Isolation, Stress or Anger?: No  Do you get the social and emotional support that you need?: Yes  Do you have a Living Will?: Yes    Advance Directives     Power of  Living Will ACP-Advance Directive ACP-Power of     Not on File Not on File Not on File Not on File      General Health Risk Interventions:  · none indicated    Health Habits/Nutrition:     Physical Activity: Insufficiently Active    Days of Exercise per Week: 2 days    Minutes of Exercise per Session: 40 min     Have you lost any weight without trying in the past 3 months?: No  Body mass index: (!) 36.94  Have you seen the dentist within the past year?: Yes    Health Habits/Nutrition Interventions:  · Inadequate physical activity:  patient is not ready to increase his/her physical activity level at this time             Objective   Vitals:    05/26/22 0758   BP: 138/78   Pulse: 78   Resp: 20   SpO2: 96%   Weight: 222 lb (100.7 kg)   Height: 5' 5\" (1.651 m)      Body mass index is 36.94 kg/m². Wt Readings from Last 3 Encounters:   05/26/22 222 lb (100.7 kg)   05/17/22 215 lb (97.5 kg)   05/11/22 215 lb (97.5 kg)        Physical Exam  Vitals reviewed. Constitutional:       General: She is not in acute distress. Appearance: She is well-developed.    Neck: Vascular: No carotid bruit. Cardiovascular:      Rate and Rhythm: Normal rate and regular rhythm. Heart sounds: Normal heart sounds. No murmur heard. No gallop. Pulmonary:      Effort: Pulmonary effort is normal.      Breath sounds: Normal breath sounds. No wheezing or rales. Abdominal:      General: Bowel sounds are normal. There is no distension. Palpations: Abdomen is soft. Tenderness: There is no abdominal tenderness. Musculoskeletal:      Cervical back: Neck supple. Right lower leg: No edema. Left lower leg: No edema. Skin:     General: Skin is warm and dry. Neurological:      Mental Status: She is alert and oriented to person, place, and time. Allergies   Allergen Reactions    Iodine      fish allergy     Prior to Visit Medications    Medication Sig Taking?  Authorizing Provider   metFORMIN (GLUCOPHAGE) 1000 MG tablet Take 1 tablet by mouth 2 times daily (with meals) Yes Andrea Means MD   indomethacin (INDOCIN SR) 75 MG extended release capsule Take 1 capsule by mouth 2 times daily (with meals) Yes Andrea Means MD   atorvastatin (LIPITOR) 20 MG tablet TAKE 1 TABLET DAILY Yes Andrea Means MD   levothyroxine (SYNTHROID) 200 MCG tablet Take 1 tablet by mouth Daily Yes Andrea Means MD   levothyroxine (SYNTHROID) 150 MCG tablet Take 1 tablet by mouth Twice a Week Yes Andrea Means MD   allopurinol (ZYLOPRIM) 100 MG tablet take 1 tablet by mouth once daily Yes Andrea Means MD   losartan-hydroCHLOROthiazide (HYZAAR) 100-25 MG per tablet take 1 tablet by mouth once daily Yes Andrea Means MD   clindamycin (CLEOCIN T) 1 % lotion apply to face twice a day Yes Historical Provider, MD   CALCIUM 600+D3 600-800 MG-UNIT TABS TAKE 1 TABLET BY MOUTH TWICE DAILY WITH BREAKFAST AND SUPPER Yes Historical Provider, MD   Ascorbic Acid (VITAMIN C) 1000 MG tablet TAKE 1 TABLET BY MOUTH DAILY FOR 30 DAYS Yes Historical Provider, MD   Blood Glucose Monitoring Suppl (ONE TOUCH ULTRA 2) w/Device KIT Check blood sugar qam Yes Steffany Collins MD   ONE TOUCH ULTRASOFT LANCETS MISC Check blood sugar every morning Yes Steffany Collins MD   blood glucose test strips (ONETOUCH ULTRA) strip Check blood sugar every morning Yes Steffany Collins MD   ketoconazole (NIZORAL) 2 % cream Apply topically daily.  Yes Steffany Collins MD   metroNIDAZOLE, TOPICAL, 0.75 % LOTN Apply to face BID Yes Steffany Collins MD   triamcinolone (KENALOG) 0.1 % cream Apply bid to affected areas prn Yes Steffany Collins MD   vitamin D (ERGOCALCIFEROL) 29749 units CAPS capsule Take 1 capsule by mouth once a week Yes Steffany Collins MD   Handicap Placard MISC by Does not apply route Unable to ambulate more than 60 feet without difficulty  Expires 10/31/2022 Yes Steffany Collins MD       [facility]Te (Including outside providers/suppliers regularly involved in providing care):   Patient Care Team:  Steffany Collins MD as PCP - General (Family Medicine)  Steffany Collins MD as PCP - [facility] Empaneled Provider     Reviewed and updated this visit:  Tobacco  Allergies  Meds  Problems  Med Hx  Surg Hx  Soc Hx  Fam Hx

## 2022-05-26 NOTE — PATIENT INSTRUCTIONS
Personalized Preventive Plan for Aniceto Glass - 5/26/2022  Medicare offers a range of preventive health benefits. Some of the tests and screenings are paid in full while other may be subject to a deductible, co-insurance, and/or copay. Some of these benefits include a comprehensive review of your medical history including lifestyle, illnesses that may run in your family, and various assessments and screenings as appropriate. After reviewing your medical record and screening and assessments performed today your provider may have ordered immunizations, labs, imaging, and/or referrals for you. A list of these orders (if applicable) as well as your Preventive Care list are included within your After Visit Summary for your review. Other Preventive Recommendations:    · A preventive eye exam performed by an eye specialist is recommended every 1-2 years to screen for glaucoma; cataracts, macular degeneration, and other eye disorders. · A preventive dental visit is recommended every 6 months. · Try to get at least 150 minutes of exercise per week or 10,000 steps per day on a pedometer . · Order or download the FREE \"Exercise & Physical Activity: Your Everyday Guide\" from [employer]. Call 5-375.573.3473 or search [employer] online. · You need 9139-4161 mg of calcium and 7899-2375 IU of vitamin D per day. It is possible to meet your calcium requirement with diet alone, but a vitamin D supplement is usually necessary to meet this goal.  · When exposed to the sun, use a sunscreen that protects against both UVA and UVB radiation with an SPF of 30 or greater. Reapply every 2 to 3 hours or after sweating, drying off with a towel, or swimming. · Always wear a seat belt when traveling in a car. Always wear a helmet when riding a bicycle or motorcycle.

## 2022-05-31 ENCOUNTER — TREATMENT (OUTPATIENT)
Dept: PHYSICAL THERAPY | Age: 67
End: 2022-05-31
Payer: MEDICARE

## 2022-05-31 DIAGNOSIS — Z96.649 PERI-PROSTHETIC FRACTURE OF SHAFT OF FEMUR: ICD-10-CM

## 2022-05-31 DIAGNOSIS — M97.8XXA PERIPROSTHETIC FRACTURE OF SHAFT OF FEMUR: Primary | ICD-10-CM

## 2022-05-31 DIAGNOSIS — Z96.649 PERIPROSTHETIC FRACTURE OF SHAFT OF FEMUR: Primary | ICD-10-CM

## 2022-05-31 DIAGNOSIS — M97.8XXA PERI-PROSTHETIC FRACTURE OF SHAFT OF FEMUR: ICD-10-CM

## 2022-05-31 PROCEDURE — 97110 THERAPEUTIC EXERCISES: CPT

## 2022-05-31 NOTE — PROGRESS NOTES
Physical Therapy Daily Treatment Note    Date: 2022  Patient Name: Artuor Richardson  :    MRN: [id number]  DOInjury: 3/12/22  DOSx: 3/14/22  Referring Provider:    Dr. Tyson Ford; [address]  [place], [address] [place] [address]      Medical Diagnosis:   Z87.81 (ICD-10-CM) - Personal history of (healed) traumatic fracture  M97. 8XXA, Z96.649 (ICD-10-CM) - Periprosthetic fracture of shaft of femur  Outcome Measure:   LEFS       X = TO BE PERFORMED NEXT VISIT  > = PROGRESS TO THIS    S: Pt reports continued shin pain when it becomes aggravated. She is concerned about her prosthesis becoming loose again. States she had shin pain prior to surgery but exercises increase the pain. O: Discussed anatomy, physiology, body mechanics, principles of loading, and progressive loading/activity. Reviewed home exercise program extensively; written copy provided. Access Code: [email or web address]  URL: [email or web address]  Date: 2022  Prepared by: Laura Randle    Exercises  Seated Heel Slide - 2 x daily - 7 x weekly - 2 sets - 25 reps  Seated Long Arc Quad - 2 x daily - 7 x weekly - 2 sets - 15 reps - 5 sec hold  Active Straight Leg Raise with Quad Set - 2 x daily - 7 x weekly - 2 sets - 15 reps - 2 sec hold      Time 5787-5358     Visit -  2-3x 6 weeks Repeat outcome measure at mid point and end.     Pain Pain 0/10     ROM      Modalities         MO   Manual            Stretch      Towel / strap DF, INV, EV   TE      TE   Exercise      Nu Step L4 x 10 min Seat 10    Heel slides TE   QS 2 x 10 hold 10stowel roll under kneeTE   SLR 4 x 10 TE   SAQ 4 x 10 TE   LAQ 2 x 20     Seated march      Standing marching  TA   Standing hip abduction  TA   Standing hip flexion     Ambulation in ll bars             [] TG  [] Leg Press 2-leg   TE   [] TG  [] Leg Press 1-leg   TE   Hamstring Curl Machine   TE   Knee Extension Machine   TE   Step-ups - FWD  TA   Step-ups - LAT  TA   Step-ups - BWD   TA   Calf Raises   TA Steps    TA   Ambulation   TA               A: Tolerated well.   Performed mat exercises to off load shin   P: Continue with rehab plan  Brent Verde PTA    Treatment Charges: Mins Units   Initial Evaluation     Re-Evaluation     Ther Exercise         TE 38 3   Manual Therapy     MT     Ther Activities        TA     Gait Training          GT     Neuro Re-education NR     Modalities     Non-Billable Service Time     Other     Total Time/Units 38 3

## 2022-06-07 ENCOUNTER — TREATMENT (OUTPATIENT)
Dept: PHYSICAL THERAPY | Age: 67
End: 2022-06-07
Payer: MEDICARE

## 2022-06-07 DIAGNOSIS — Z96.649 PERI-PROSTHETIC FRACTURE OF SHAFT OF FEMUR: ICD-10-CM

## 2022-06-07 DIAGNOSIS — Z96.649 PERIPROSTHETIC FRACTURE OF SHAFT OF FEMUR: Primary | ICD-10-CM

## 2022-06-07 DIAGNOSIS — M97.8XXA PERI-PROSTHETIC FRACTURE OF SHAFT OF FEMUR: ICD-10-CM

## 2022-06-07 DIAGNOSIS — M97.8XXA PERIPROSTHETIC FRACTURE OF SHAFT OF FEMUR: Primary | ICD-10-CM

## 2022-06-07 PROCEDURE — 97110 THERAPEUTIC EXERCISES: CPT

## 2022-06-07 NOTE — PROGRESS NOTES
Physical Therapy Daily Treatment Note    Date: 2022  Patient Name: Glenda Rizvi  :    MRN: [id number]  DOInjury: 3/12/22  DOSx: 3/14/22  Referring Provider:    Dr. Juju Deluna [facility]; [address]  [place], [address] [place] [address] Diagnosis:   Z87.81 (ICD-10-CM) - Personal history of (healed) traumatic fracture  M97. 8XXA, Z96.649 (ICD-10-CM) - Periprosthetic fracture of shaft of femur  Outcome Measure:   LEFS       X = TO BE PERFORMED NEXT VISIT  > = PROGRESS TO THIS    S: Pt reports decreased shin pain. States has been ambulating short distances using straight cane. O: Discussed anatomy, physiology, body mechanics, principles of loading, and progressive loading/activity. Reviewed home exercise program extensively; written copy provided. Access Code: [email or web address]  URL: [email or web address]  Date: 2022  Prepared by: Sharin Sandhoff    Exercises  Seated Heel Slide - 2 x daily - 7 x weekly - 2 sets - 25 reps  Seated Long Arc Quad - 2 x daily - 7 x weekly - 2 sets - 15 reps - 5 sec hold  Active Straight Leg Raise with Quad Set - 2 x daily - 7 x weekly - 2 sets - 15 reps - 2 sec hold      Time 0813-4229     Visit   2-3x 6 weeks Repeat outcome measure at mid point and end. Pain Pain 0/10     ROM      Modalities         MO   Manual            Stretch      Towel / strap DF, INV, EV   TE      TE   Exercise      Nu Step L5 x 10 min Seat 10    Heel slides TE   QS 2 x 10 hold 10stowel roll under kneeTE   SLR 4 x 10 TE   SAQ 4 x 10 TE   LAQ 2 x 20     Seated march      Standing marching  TA   Standing hip abduction  TA   Standing hip flexion     Ambulation in ll bars             [] TG  [] Leg Press 2-leg   TE   [] TG  [] Leg Press 1-leg   TE   Hamstring Curl Machine   TE   Knee Extension Machine   TE   Step-ups - FWD  TA   Step-ups - LAT  TA   Step-ups - BWD   TA   Calf Raises   TA         Steps    TA   Ambulation   TA               A: Tolerated well. Shine pain improved. Ambulating with straight cane in clinic with good control and no LOB  P: Continue with rehab plan  Gisela Kimbrough PTA    Treatment Charges: Mins Units   Initial Evaluation     Re-Evaluation     Ther Exercise         TE 40 3   Manual Therapy     MT     Ther Activities        TA     Gait Training          GT     Neuro Re-education NR     Modalities     Non-Billable Service Time     Other     Total Time/Units 40 3

## 2022-06-14 ENCOUNTER — TREATMENT (OUTPATIENT)
Dept: PHYSICAL THERAPY | Age: 67
End: 2022-06-14
Payer: MEDICARE

## 2022-06-14 DIAGNOSIS — Z96.649 PERI-PROSTHETIC FRACTURE OF SHAFT OF FEMUR: ICD-10-CM

## 2022-06-14 DIAGNOSIS — M97.8XXA PERI-PROSTHETIC FRACTURE OF SHAFT OF FEMUR: ICD-10-CM

## 2022-06-14 DIAGNOSIS — M97.8XXA PERIPROSTHETIC FRACTURE OF SHAFT OF FEMUR: Primary | ICD-10-CM

## 2022-06-14 DIAGNOSIS — Z96.649 PERIPROSTHETIC FRACTURE OF SHAFT OF FEMUR: Primary | ICD-10-CM

## 2022-06-14 PROCEDURE — 97110 THERAPEUTIC EXERCISES: CPT

## 2022-06-14 NOTE — PROGRESS NOTES
Physical Therapy Daily Treatment Note    Date: 2022  Patient Name: Tremaine Rosa  :    MRN: [id number]  DOInjury: 3/12/22  DOSx: 3/14/22  Referring Provider:    Dr. Yehuda Tirado [facility]; [address]  [place], [facility]      Medical Diagnosis:   Z87.81 (ICD-10-CM) - Personal history of (healed) traumatic fracture  M97. 8XXA, Z96.649 (ICD-10-CM) - Periprosthetic fracture of shaft of femur  Outcome Measure:   LEFS       X = TO BE PERFORMED NEXT VISIT  > = PROGRESS TO THIS    S: Pt reports continued decrease in shin pain. States was able to drive herself and walk around [employer] which she hasn't been able to do since accident. Has been trying to ambulate without AD but limp is significant with soreness lateral aspect of knee. O: Discussed anatomy, physiology, body mechanics, principles of loading, and progressive loading/activity. Reviewed home exercise program extensively; written copy provided. Access Code: [email or web address]  URL: [email or web address]  Date: 2022  Prepared by: Vandana Valles    Exercises  Seated Heel Slide - 2 x daily - 7 x weekly - 2 sets - 25 reps  Seated Long Arc Quad - 2 x daily - 7 x weekly - 2 sets - 15 reps - 5 sec hold  Active Straight Leg Raise with Quad Set - 2 x daily - 7 x weekly - 2 sets - 15 reps - 2 sec hold      Time 2286-7275     Visit 15/12-18  2-3x 6 weeks Repeat outcome measure at mid point and end.     Pain Pain 0/10     ROM      Modalities         MO   Manual            Stretch      Towel / strap DF, INV, EV   TE      TE   Exercise      Nu Step L5 x 10 min Seat 10    Heel slides TE   QS 2 x 10 hold 10stowel roll under kneeTE   SLR 4 x 10 TE   SAQ 4 x 10 TE   LAQ 2 x 20     Seated march      Standing marching  TA   Standing hip abduction  TA   Standing hip flexion     Ambulation in ll bars             [] TG  [] Leg Press 2-leg   TE   [] TG  [] Leg Press 1-leg   TE   Hamstring Curl Machine   TE   Knee Extension Machine   TE   Step-ups - FWD  TA Step-ups - LAT  TA   Step-ups - BWD   TA   Calf Raises   TA         Steps    TA   Ambulation 2 X 150ft with SC  1 x 50ft no AD SBA  TA               A: Tolerated well. Ambulated short distance with no AD with limp but no LOB; Stand by assist for safety. Discussed walking without AD in household with area to hold on to in close proximity for safety. P: Continue with rehab plan.  Pt wants to be able to ambulate without AD  Curt Ramon PTA    Treatment Charges: Mins Units   Initial Evaluation     Re-Evaluation     Ther Exercise         TE 40 3   Manual Therapy     MT     Ther Activities        TA     Gait Training          GT     Neuro Re-education NR     Modalities     Non-Billable Service Time     Other     Total Time/Units 40 3

## 2022-06-15 ENCOUNTER — PATIENT MESSAGE (OUTPATIENT)
Dept: FAMILY MEDICINE CLINIC | Age: 67
End: 2022-06-15

## 2022-06-15 DIAGNOSIS — K13.79 LESION OF HARD PALATE: Primary | ICD-10-CM

## 2022-06-20 DIAGNOSIS — M97.11XD PERIPROSTHETIC FRACTURE AROUND INTERNAL PROSTHETIC RIGHT KNEE JOINT, SUBSEQUENT ENCOUNTER: Primary | ICD-10-CM

## 2022-06-21 ENCOUNTER — TREATMENT (OUTPATIENT)
Dept: PHYSICAL THERAPY | Age: 67
End: 2022-06-21
Payer: MEDICARE

## 2022-06-21 DIAGNOSIS — M97.8XXA PERIPROSTHETIC FRACTURE OF SHAFT OF FEMUR: Primary | ICD-10-CM

## 2022-06-21 DIAGNOSIS — M97.8XXA PERI-PROSTHETIC FRACTURE OF SHAFT OF FEMUR: ICD-10-CM

## 2022-06-21 DIAGNOSIS — Z96.649 PERI-PROSTHETIC FRACTURE OF SHAFT OF FEMUR: ICD-10-CM

## 2022-06-21 DIAGNOSIS — Z96.649 PERIPROSTHETIC FRACTURE OF SHAFT OF FEMUR: Primary | ICD-10-CM

## 2022-06-21 PROCEDURE — 97110 THERAPEUTIC EXERCISES: CPT

## 2022-06-21 NOTE — PROGRESS NOTES
Physical Therapy Daily Treatment Note    Date: 2022  Patient Name: Ivan Suero  :    MRN: [id number]  DOInjury: 3/12/22  DOSx: 3/14/22  Referring Provider:    Dr. Sabino Ford; [address]  [place], [facility]      Medical Diagnosis:   Z87.81 (ICD-10-CM) - Personal history of (healed) traumatic fracture  M97. 8XXA, Z96.649 (ICD-10-CM) - Periprosthetic fracture of shaft of femur  Outcome Measure:   LEFS       X = TO BE PERFORMED NEXT VISIT  > = PROGRESS TO THIS    S: Pt reports  No shin pain. States knee is sore today which is normal.  O: Ambulated with straight cane in clinic    Access Code: [email or web address]  URL: [email or web address]  Date: 2022  Prepared by: Elvia Mauro    Exercises  Seated Heel Slide - 2 x daily - 7 x weekly - 2 sets - 25 reps  Seated Long Arc Quad - 2 x daily - 7 x weekly - 2 sets - 15 reps - 5 sec hold  Active Straight Leg Raise with Quad Set - 2 x daily - 7 x weekly - 2 sets - 15 reps - 2 sec hold      Time 9692-7219     Visit 15/12-18  2-3x 6 weeks Repeat outcome measure at mid point and end. Pain Pain 0/10     ROM      Modalities         MO   Manual            Stretch      Towel / strap DF, INV, EV   TE      TE   Exercise      Nu Step L5 x 10 min Seat 10    Heel slides TE   QS 2 x 10 hold 10stowel roll under kneeTE   SLR 4 x 10 TE   SAQ 4 x 10 TE   LAQ 2 x 20     Seated march      Standing marching  TA   Standing hip abduction  TA   Standing hip flexion     Ambulation in ll bars             [] TG  [] Leg Press 2-leg   TE   [] TG  [] Leg Press 1-leg   TE   Hamstring Curl Machine   TE   Knee Extension Machine   TE   Step-ups - FWD  TA   Step-ups - LAT  TA   Step-ups - BWD   TA   Calf Raises   TA         Steps    TA   Ambulation 2 X 150ft with SC  1 x 10ft no AD SBA  TA               A: Tolerated well. Ambulated short distance with no AD with limp but no LOB; Stand by assist for safety.  Discussed walking without AD in household with area to hold on to in close proximity for safety. P: Continue with rehab plan.  Pt wants to be able to ambulate without AD  Nish Mcgill, GUY    Treatment Charges: Mins Units   Initial Evaluation     Re-Evaluation     Ther Exercise         TE 35 2   Manual Therapy     MT     Ther Activities        TA     Gait Training          GT     Neuro Re-education NR     Modalities     Non-Billable Service Time     Other     Total Time/Units 35 2

## 2022-06-22 ENCOUNTER — PATIENT MESSAGE (OUTPATIENT)
Dept: FAMILY MEDICINE CLINIC | Age: 67
End: 2022-06-22

## 2022-06-22 ENCOUNTER — OFFICE VISIT (OUTPATIENT)
Dept: ORTHOPEDIC SURGERY | Age: 67
End: 2022-06-22
Payer: MEDICARE

## 2022-06-22 VITALS — HEIGHT: 65 IN | BODY MASS INDEX: 36.99 KG/M2 | TEMPERATURE: 98 F | WEIGHT: 222 LBS

## 2022-06-22 DIAGNOSIS — M97.11XD PERIPROSTHETIC FRACTURE AROUND INTERNAL PROSTHETIC RIGHT KNEE JOINT, SUBSEQUENT ENCOUNTER: Primary | ICD-10-CM

## 2022-06-22 DIAGNOSIS — R92.1 MAMMOGRAPHIC CALCIFICATION FOUND ON DIAGNOSTIC IMAGING OF BREAST: ICD-10-CM

## 2022-06-22 PROCEDURE — G8400 PT W/DXA NO RESULTS DOC: HCPCS | Performed by: ORTHOPAEDIC SURGERY

## 2022-06-22 PROCEDURE — G8427 DOCREV CUR MEDS BY ELIG CLIN: HCPCS | Performed by: ORTHOPAEDIC SURGERY

## 2022-06-22 PROCEDURE — 1036F TOBACCO NON-USER: CPT | Performed by: ORTHOPAEDIC SURGERY

## 2022-06-22 PROCEDURE — 3017F COLORECTAL CA SCREEN DOC REV: CPT | Performed by: ORTHOPAEDIC SURGERY

## 2022-06-22 PROCEDURE — G8417 CALC BMI ABV UP PARAM F/U: HCPCS | Performed by: ORTHOPAEDIC SURGERY

## 2022-06-22 PROCEDURE — 1123F ACP DISCUSS/DSCN MKR DOCD: CPT | Performed by: ORTHOPAEDIC SURGERY

## 2022-06-22 PROCEDURE — 99213 OFFICE O/P EST LOW 20 MIN: CPT | Performed by: ORTHOPAEDIC SURGERY

## 2022-06-22 PROCEDURE — 1090F PRES/ABSN URINE INCON ASSESS: CPT | Performed by: ORTHOPAEDIC SURGERY

## 2022-06-22 NOTE — TELEPHONE ENCOUNTER
From: Gerry Soulier  To: Dr. Yasmany Delgado  Sent: 6/22/2022 10:22 AM EDT  Subject: Mammogram     Hi, I could not get the results of my mammogram on My Chart. Can you please let me know if it was ok.  Thanks

## 2022-06-22 NOTE — PROGRESS NOTES
Chief Complaint   Patient presents with    Leg Pain     Right femur ORIF follow up. Doing well. Wants to know why she is limping without a walker. HPI:    Patient is 79 y.o. female here today for continued follow-up of Right femur ORIF from injury on 3/12/2022. She missed bottom step and fell. Surgery on 3/14/2022 in [place]. She was there for additional 2 weeks before coming home. Has been in PT for the past few weeks and doing well, not much pain. Patient is ambulating with walker and actually has been doing physical therapy at this time. On follow-up today, patient states she is doing much better overall ambulating with walker. ROS:    Skin: (-) rash,(-) psoriasis,(-) eczema, (-)skin cancer. Neurologic: (-)numbness, (-)tingling, (-)headaches, (-) LOC. Cardiovascular: (-) Chest pain, (-) swelling in legs/feet, (-) SOB, (-) cramping in legs/feet with walking.     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    Overview:  [facility] [address] [facility], 2001    Chronic nonalcoholic liver disease 15/00/8362    Diabetes ([facility] [address] 2019    Fracture of right lower extremity     Hyperlipidemia     Hypertension     Hypothyroidism     Malignant neoplasm of ovary ([facility] [address] 2001    Obesity     Positive FIT (fecal immunochemical test) 04/26/2018    Type II or unspecified type diabetes mellitus without mention of complication, not stated as uncontrolled     Unspecified vitamin D deficiency 01/14/2016     Past Surgical History:   Procedure Laterality Date    COLONOSCOPY      HYSTERECTOMY (CERVIX STATUS UNKNOWN)      JOINT REPLACEMENT      total knees bilateral    KNEE SURGERY Bilateral 2006/ 2009    AL COLONOSCOPY FLX DX W/COLLJ SPEC WHEN PFRMD N/A 5/30/2018    COLONOSCOPY performed by Mookie Bowling MD at [place] [address]       Current Outpatient Medications:     metFORMIN (GLUCOPHAGE) 1000 MG tablet, Take 1 tablet by mouth 2 times daily (with meals), Disp: 180 tablet, Rfl: 1    indomethacin (INDOCIN SR) 75 MG extended release capsule, Take 1 capsule by mouth 2 times daily (with meals), Disp: 180 capsule, Rfl: 3    atorvastatin (LIPITOR) 20 MG tablet, TAKE 1 TABLET DAILY, Disp: 90 tablet, Rfl: 3    levothyroxine (SYNTHROID) 200 MCG tablet, Take 1 tablet by mouth Daily, Disp: 132 tablet, Rfl: 1    levothyroxine (SYNTHROID) 150 MCG tablet, Take 1 tablet by mouth Twice a Week, Disp: 90 tablet, Rfl: 1    allopurinol (ZYLOPRIM) 100 MG tablet, take 1 tablet by mouth once daily, Disp: 90 tablet, Rfl: 1    losartan-hydroCHLOROthiazide (HYZAAR) 100-25 MG per tablet, take 1 tablet by mouth once daily, Disp: 90 tablet, Rfl: 1    clindamycin (CLEOCIN T) 1 % lotion, apply to face twice a day, Disp: , Rfl:     CALCIUM 600+D3 600-800 MG-UNIT TABS, TAKE 1 TABLET BY MOUTH TWICE DAILY WITH BREAKFAST AND SUPPER, Disp: , Rfl:     Ascorbic Acid (VITAMIN C) 1000 MG tablet, TAKE 1 TABLET BY MOUTH DAILY FOR 30 DAYS, Disp: , Rfl:     Blood Glucose Monitoring Suppl (ONE TOUCH ULTRA 2) w/Device KIT, Check blood sugar qa, Disp: 1 kit, Rfl: 0    ONE TOUCH ULTRASOFT LANCETS MISC, Check blood sugar every morning, Disp: 150 each, Rfl: 3    blood glucose test strips (ONETOUCH ULTRA) strip, Check blood sugar every morning, Disp: 150 each, Rfl: 3    ketoconazole (NIZORAL) 2 % cream, Apply topically daily. , Disp: 60 g, Rfl: 5    metroNIDAZOLE, TOPICAL, 0.75 % LOTN, Apply to face BID, Disp: 3 Bottle, Rfl: 3    triamcinolone (KENALOG) 0.1 % cream, Apply bid to affected areas prn, Disp: 1200 g, Rfl: 3    vitamin D (ERGOCALCIFEROL) 21421 units CAPS capsule, Take 1 capsule by mouth once a week, Disp: 12 capsule, Rfl: 1    Handicap Placard [facility], by Does not apply route Unable to ambulate more than 60 feet without difficulty Expires 10/31/2022, Disp: 1 each, Rfl: 0  Allergies   Allergen Reactions    Iodine      fish allergy     Social History Socioeconomic History    Marital status:      Spouse name: Not on file    Number of children: Not on file    Years of education: Not on file    Highest education level: Not on file   Occupational History    Not on file   Tobacco Use    Smoking status: Never Smoker    Smokeless tobacco: Never Used   Vaping Use    Vaping Use: Never used   Substance and Sexual Activity    Alcohol use: Yes     Comment: social    Drug use: No    Sexual activity: Not Currently   Other Topics Concern    Not on file   Social History Narrative    Not on file     Social Determinants of Health     Financial Resource Strain: Low Risk     Difficulty of Paying Living Expenses: Not hard at all   Food Insecurity: No Food Insecurity    Worried About [address] in the Last Year: Never true    [address] [employer] in the Last Year: Never true   Transportation Needs:     Lack of Transportation (Medical): Not on file    Lack of Transportation (Non-Medical):  Not on file   Physical Activity: Insufficiently Active    Days of Exercise per Week: 2 days    Minutes of Exercise per Session: 40 min   Stress: No Stress Concern Present    Feeling of Stress : Not at all   Social Connections:     Frequency of Communication with Friends and Family: Not on file    Frequency of Social Gatherings with Friends and Family: Not on file    Attends Judaism Services: Not on file    Active Member of Clubs or Organizations: Not on file    Attends Club or Organization Meetings: Not on file    Marital Status: Not on file   Intimate Partner Violence:     Fear of Current or Ex-Partner: Not on file    Emotionally Abused: Not on file    Physically Abused: Not on file    Sexually Abused: Not on file   Housing Stability:     Unable to Pay for Housing in the Last Year: Not on file    Number of [place] in the Last Year: Not on file    Unstable Housing in the Last Year: Not on file     Family History   Problem Relation Age of Onset  Early Death Mother            Physical Exam:    Temp 98 °F (36.7 °C)   Ht 5' 5\" (1.651 m)   Wt 222 lb (100.7 kg)   BMI 36.94 kg/m²     GENERAL: alert, appears stated age, cooperative, no acute distress    HEENT: Head is normocephalic, atraumatic. PERRLA. SKIN: Clean, dry, intact. There is not any cellulitis or cutaneous lesions noted in the lower extremities     PULMONARY: breathing is regular and unlabored, no acute distress     CV: The bilateral lower extremities are warm and well-perfused with brisk capillary refill. 2+ pulses LE bilateral.     ABDOMINAL: Nontender, nondistended     PSYCHIATRY: Pleasant mood, appropriate behavior, follows commands     NEURO: Sensation is intact distally with light touch with no alteration. Motor exam of the lower extremities show quadriceps, hamstrings, foot dorsiflexion and plantarflexion grossly intact 5/5. LYMPH: No lymphedema present distally in upper or lower extremity. MUSCULOSKELETAL:  Examination of right knee reveals well-healed incisional scar of total knee replacement as well as right lateral thigh with removal of sutures and staples. No signs of infection or cellulitic process. Range of motion is limited but 5- 100 degrees. There is no varus valgus instability. Nontender along fracture. Mild improvement since last visit    Imaging:  XR FEMUR RIGHT (MIN 2 VIEWS)    Result Date: 6/22/2022  EXAMINATION: 2 XRAY VIEWS OF THE RIGHT FEMUR 6/22/2022 7:31 am COMPARISON: 11 May 2022 HISTORY: ORDERING SYSTEM PROVIDED HISTORY: Periprosthetic fracture around internal prosthetic right knee joint, subsequent encounter FINDINGS: Proximal to distal the 5th fixation screw has fractured. The remainder of the femoral fixation hardware is intact. There is an anatomically aligned right TKA. There is increasing callus formation at the site of femur fracture. Counting proximal to distal, fracture of the 5th femoral fixation screw.  Increasing femur callus formation. See above. Wayne Min was seen today for leg pain. Diagnoses and all orders for this visit:    Periprosthetic fracture around internal prosthetic right knee joint, subsequent encounter  -     [facility]        Patient seen and examined. Imaging reviewed with patient in detail. Natural history and course discussed with patient in long discussion  Treatment options discussed with patient in detail including risks and benefits. Patient should do well with continued conservative management. Patient will continue postoperative protocol set forth by operating surgeon. Patient was educated about implant and long-term natural history and healing process and times. Patient will continue with physical therapy. Follow-up in 3 to 4 weeks with new x-rays. In a 15 minute assessment and discussion, patient was counseled on weight loss, healthy diet, and physical activity relating to this condition. She was educated with options in detail including nutrition, joining a health club/ weight loss program, and use of cardio equipment such as the Arc Trainer and the importance of use as well as range of motion and HEP exercises for weight loss and general health.      Luc Nesbitt, DO

## 2022-06-23 NOTE — TELEPHONE ENCOUNTER
Mass[employer] message communication with this patient:    Hannah Schwarz--    Name is Dr. Gabriele Da Silva; I am covering for Dr. Kiran Cooper in her absence this week. I reviewed your mammogram results. You will need a repeat mammogram of your left breast in 6 months; right breast can be imaged in 1 year.

## 2022-06-29 ENCOUNTER — TREATMENT (OUTPATIENT)
Dept: PHYSICAL THERAPY | Age: 67
End: 2022-06-29
Payer: MEDICARE

## 2022-06-29 DIAGNOSIS — M97.8XXA PERIPROSTHETIC FRACTURE OF SHAFT OF FEMUR: Primary | ICD-10-CM

## 2022-06-29 DIAGNOSIS — Z96.649 PERIPROSTHETIC FRACTURE OF SHAFT OF FEMUR: Primary | ICD-10-CM

## 2022-06-29 DIAGNOSIS — Z96.649 PERI-PROSTHETIC FRACTURE OF SHAFT OF FEMUR: ICD-10-CM

## 2022-06-29 DIAGNOSIS — M97.8XXA PERI-PROSTHETIC FRACTURE OF SHAFT OF FEMUR: ICD-10-CM

## 2022-06-29 PROCEDURE — 97110 THERAPEUTIC EXERCISES: CPT

## 2022-06-29 NOTE — PROGRESS NOTES
Physical Therapy Daily Treatment Note    Date: 2022  Patient Name: Olga Lidia Guzman  : [id number]   MRN: [id number]  DOInjury: 3/12/22  DOSx: 3/14/22  Referring Provider:    Dr. Stefani oFrd; [address]  [place]      Medical Diagnosis:   Z87.81 (ICD-10-CM) - Personal history of (healed) traumatic fracture  M97. 8XXA, Z96.649 (ICD-10-CM) - Periprosthetic fracture of shaft of femur  Outcome Measure:   LEFS       X = TO BE PERFORMED NEXT VISIT  > = PROGRESS TO THIS    S: Pt reports  No shin pain. States knee is sore today which is normal.  O: Ambulated with straight cane in clinic    Access Code: [email or web address]  URL: [email or web address]  Date: 2022  Prepared by: Rubin Lawler    Exercises  Seated Heel Slide - 2 x daily - 7 x weekly - 2 sets - 25 reps  Seated Long Arc Quad - 2 x daily - 7 x weekly - 2 sets - 15 reps - 5 sec hold  Active Straight Leg Raise with Quad Set - 2 x daily - 7 x weekly - 2 sets - 15 reps - 2 sec hold      Time 2878-3472     Visit   2-3x 6 weeks Repeat outcome measure at mid point and end. Pain Pain 0/10     ROM      Modalities         MO   Manual            Stretch      Towel / strap DF, INV, EV   TE      TE   Exercise      Nu Step L5 x 10 min Seat 10    Heel slides TE   QS 2 x 10 hold 10stowel roll under kneeTE   SLR 4 x 10 TE   SAQ 4 x 10 TE   LAQ 2 x 20     Seated march      Standing marching  TA   Standing hip abduction  TA   Standing hip flexion     Ambulation in ll bars             [] TG  [] Leg Press 2-leg   TE   [] TG  [] Leg Press 1-leg   TE   Hamstring Curl Machine   TE   Knee Extension Machine   TE   Step-ups - FWD  TA   Step-ups - LAT  TA   Step-ups - BWD   TA   Calf Raises   TA         Steps    TA   Ambulation 2 X 150ft with SC  1 x 25ft no AD SBA  TA               A: Tolerated well. Ambulated short distance with no AD with limp but no LOB; Stand by assist for safety.  Discussed walking without AD in household with area to hold on to in close proximity for safety. P: Continue with rehab plan.  Pt wants to be able to ambulate without AD  Vaibhav Bustillo PTA    Treatment Charges: Mins Units   Initial Evaluation     Re-Evaluation     Ther Exercise         TE 35 2   Manual Therapy     MT     Ther Activities        TA     Gait Training          GT     Neuro Re-education NR     Modalities     Non-Billable Service Time     Other     Total Time/Units 35 2

## 2022-07-12 ENCOUNTER — TREATMENT (OUTPATIENT)
Dept: PHYSICAL THERAPY | Age: 67
End: 2022-07-12
Payer: MEDICARE

## 2022-07-12 DIAGNOSIS — M97.8XXA PERIPROSTHETIC FRACTURE OF SHAFT OF FEMUR: Primary | ICD-10-CM

## 2022-07-12 DIAGNOSIS — Z96.649 PERIPROSTHETIC FRACTURE OF SHAFT OF FEMUR: Primary | ICD-10-CM

## 2022-07-12 DIAGNOSIS — M97.8XXA PERI-PROSTHETIC FRACTURE OF SHAFT OF FEMUR: ICD-10-CM

## 2022-07-12 DIAGNOSIS — Z96.649 PERI-PROSTHETIC FRACTURE OF SHAFT OF FEMUR: ICD-10-CM

## 2022-07-12 PROCEDURE — 97110 THERAPEUTIC EXERCISES: CPT

## 2022-07-12 NOTE — PROGRESS NOTES
Physical Therapy Daily Treatment Note    Date: 2022  Patient Name: Marc Collazo  :    MRN: [id number]  DOInjury: 3/12/22  DOSx: 3/14/22  Referring Provider:    Dr. Eugene Lange [facility]; [address]  [place], [facility]      Medical Diagnosis:   Z87.81 (ICD-10-CM) - Personal history of (healed) traumatic fracture  M97. 8XXA, Z96.649 (ICD-10-CM) - Periprosthetic fracture of shaft of femur  Outcome Measure:   LEFS       X = TO BE PERFORMED NEXT VISIT  > = PROGRESS TO THIS    S: Pt feels she 80% improvement since starting PT. Continues to be limited by endurance and ambulation without AD which she was able to do before injury. Able to walk approx 1hr with AD or support before knee really becomes painful. Rest decreases pain and usually better by next day  O:     Access Code: [email or web address]  URL: [email or web address]  Date: 2022  Prepared by: Olga Roldan    Exercises  Seated Heel Slide - 2 x daily - 7 x weekly - 2 sets - 25 reps  Seated Long Arc Quad - 2 x daily - 7 x weekly - 2 sets - 15 reps - 5 sec hold  Active Straight Leg Raise with Quad Set - 2 x daily - 7 x weekly - 2 sets - 15 reps - 2 sec hold      Time 9470-7432     Visit -  2-3x 6 weeks Repeat outcome measure at mid point and end. Pain Pain 0/10     ROM      Modalities         MO   Manual            Stretch      Towel / strap DF, INV, EV   TE      TE   Exercise      Nu Step L5 x 10 min Seat 10    Heel slides TE   QS 2 x 10 hold 10stowel roll under kneeTE   SLR 4 x 10 TE   SAQ 4 x 10 TE   Supine hip Abduction NEXT? LAQ 2 x 20           Seated march  ADD BACK IN?     Standing marching  TA   Standing hip abduction  TA   Standing hip flexion     Ambulation in ll bars             [] TG  [] Leg Press 2-leg   TE   [] TG  [] Leg Press 1-leg   TE   Hamstring Curl Machine   TE   Knee Extension Machine   TE   Step-ups - FWD  TA   Step-ups - LAT  TA   Step-ups - BWD   TA   Calf Raises   TA         Steps    TA   Ambulation 2 X 150ft with SC    TA               A: Tolerated well. Discussed increasing hold times and/or slowing speed of movement (3s up 3s down)  to add resistance without weights as they increase shin pain. May add supine hip Abduction and seated march next visit  P: Continue with rehab plan.  Pt wants to be able to ambulate without AD moderate distances  Ramona Must, PTA    Treatment Charges: Mins Units   Initial Evaluation     Re-Evaluation     Ther Exercise         TE 35 2   Manual Therapy     MT     Ther Activities        TA     Gait Training          GT     Neuro Re-education NR     Modalities     Non-Billable Service Time     Other     Total Time/Units 35 2

## 2022-07-18 ENCOUNTER — TELEPHONE (OUTPATIENT)
Dept: PHYSICAL THERAPY | Age: 67
End: 2022-07-18

## 2022-07-18 NOTE — TELEPHONE ENCOUNTER
Spoke with patient ankle is hurting didn't want to come in but will see  this week.   Will call if additional therapy is needed

## 2022-07-19 ENCOUNTER — PATIENT MESSAGE (OUTPATIENT)
Dept: FAMILY MEDICINE CLINIC | Age: 67
End: 2022-07-19

## 2022-07-19 DIAGNOSIS — M97.11XD PERIPROSTHETIC FRACTURE AROUND INTERNAL PROSTHETIC RIGHT KNEE JOINT, SUBSEQUENT ENCOUNTER: Primary | ICD-10-CM

## 2022-07-20 ENCOUNTER — OFFICE VISIT (OUTPATIENT)
Dept: ORTHOPEDIC SURGERY | Age: 67
End: 2022-07-20
Payer: MEDICARE

## 2022-07-20 VITALS — WEIGHT: 215 LBS | BODY MASS INDEX: 36.7 KG/M2 | TEMPERATURE: 98 F | HEIGHT: 64 IN

## 2022-07-20 DIAGNOSIS — M97.11XD PERIPROSTHETIC FRACTURE AROUND INTERNAL PROSTHETIC RIGHT KNEE JOINT, SUBSEQUENT ENCOUNTER: Primary | ICD-10-CM

## 2022-07-20 DIAGNOSIS — T84.84XA PAINFUL ORTHOPAEDIC HARDWARE (HCC): ICD-10-CM

## 2022-07-20 DIAGNOSIS — E03.9 HYPOTHYROIDISM (ACQUIRED): ICD-10-CM

## 2022-07-20 LAB — TSH SERPL DL<=0.05 MIU/L-ACNC: 0.66 UIU/ML (ref 0.27–4.2)

## 2022-07-20 PROCEDURE — 1090F PRES/ABSN URINE INCON ASSESS: CPT | Performed by: ORTHOPAEDIC SURGERY

## 2022-07-20 PROCEDURE — 1036F TOBACCO NON-USER: CPT | Performed by: ORTHOPAEDIC SURGERY

## 2022-07-20 PROCEDURE — G8417 CALC BMI ABV UP PARAM F/U: HCPCS | Performed by: ORTHOPAEDIC SURGERY

## 2022-07-20 PROCEDURE — 3017F COLORECTAL CA SCREEN DOC REV: CPT | Performed by: ORTHOPAEDIC SURGERY

## 2022-07-20 PROCEDURE — 1123F ACP DISCUSS/DSCN MKR DOCD: CPT | Performed by: ORTHOPAEDIC SURGERY

## 2022-07-20 PROCEDURE — 99214 OFFICE O/P EST MOD 30 MIN: CPT | Performed by: ORTHOPAEDIC SURGERY

## 2022-07-20 PROCEDURE — G8400 PT W/DXA NO RESULTS DOC: HCPCS | Performed by: ORTHOPAEDIC SURGERY

## 2022-07-20 PROCEDURE — G8427 DOCREV CUR MEDS BY ELIG CLIN: HCPCS | Performed by: ORTHOPAEDIC SURGERY

## 2022-07-20 NOTE — PROGRESS NOTES
Chief Complaint   Patient presents with    Knee Pain     Rt knee decreased ROM weight bearing less than prior. Leonardo Garcia a pop from rt knee on Sunday. HPI:    Patient is 79 y.o. female here today for continued follow-up of Right femur ORIF from injury on 3/12/2022. She missed bottom step and fell. Surgery on 3/14/2022 in [place]. She was there for additional 2 weeks before coming home. Has been in PT for the past few weeks and doing well, not much pain. Patient is ambulating with walker. On follow-up today, patient states she she was doing well and had gone to several stores the previous Sunday night when walking around without the use of ambulatory aids. However later that day she sat down and felt a pop in her right knee. Patient has little to no pain but states she is a little more weak since that time. Patient presents today several days later in clinic ambulating with walker still. ROS:    Skin: (-) rash,(-) psoriasis,(-) eczema, (-)skin cancer. Neurologic: (-)numbness, (-)tingling, (-)headaches, (-) LOC. Cardiovascular: (-) Chest pain, (-) swelling in legs/feet, (-) SOB, (-) cramping in legs/feet with walking.     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    Overview:  [facility] [address] [address] Rx, 2001    Chronic nonalcoholic liver disease 45/75/5843    Diabetes ([facility] [address] 2019    Fracture of right lower extremity     Hyperlipidemia     Hypertension     Hypothyroidism     Malignant neoplasm of ovary ([facility] [address] 2001    Obesity     Positive FIT (fecal immunochemical test) 04/26/2018    Type II or unspecified type diabetes mellitus without mention of complication, not stated as uncontrolled     Unspecified vitamin D deficiency 01/14/2016     Past Surgical History:   Procedure Laterality Date    COLONOSCOPY      HYSTERECTOMY (CERVIX STATUS UNKNOWN)      JOINT REPLACEMENT      total knees bilateral    KNEE SURGERY Bilateral 2006/ 2009    [facility] COLONOSCOPY FLX DX W/COLLJ SPEC WHEN PFRMD N/A 5/30/2018    COLONOSCOPY performed by Jose Sigala MD at [place] [address]       Current Outpatient Medications:     metFORMIN (GLUCOPHAGE) 1000 MG tablet, Take 1 tablet by mouth 2 times daily (with meals), Disp: 180 tablet, Rfl: 1    indomethacin (INDOCIN SR) 75 MG extended release capsule, Take 1 capsule by mouth 2 times daily (with meals), Disp: 180 capsule, Rfl: 3    atorvastatin (LIPITOR) 20 MG tablet, TAKE 1 TABLET DAILY, Disp: 90 tablet, Rfl: 3    levothyroxine (SYNTHROID) 200 MCG tablet, Take 1 tablet by mouth Daily, Disp: 132 tablet, Rfl: 1    levothyroxine (SYNTHROID) 150 MCG tablet, Take 1 tablet by mouth Twice a Week, Disp: 90 tablet, Rfl: 1    allopurinol (ZYLOPRIM) 100 MG tablet, take 1 tablet by mouth once daily, Disp: 90 tablet, Rfl: 1    losartan-hydroCHLOROthiazide (HYZAAR) 100-25 MG per tablet, take 1 tablet by mouth once daily, Disp: 90 tablet, Rfl: 1    clindamycin (CLEOCIN T) 1 % lotion, apply to face twice a day, Disp: , Rfl:     CALCIUM 600+D3 600-800 MG-UNIT TABS, TAKE 1 TABLET BY MOUTH TWICE DAILY WITH BREAKFAST AND SUPPER, Disp: , Rfl:     Ascorbic Acid (VITAMIN C) 1000 MG tablet, TAKE 1 TABLET BY MOUTH DAILY FOR 30 DAYS, Disp: , Rfl:     Blood Glucose Monitoring Suppl (ONE TOUCH ULTRA 2) w/Device KIT, Check blood sugar qa, Disp: 1 kit, Rfl: 0    ONE TOUCH ULTRASOFT LANCETS MISC, Check blood sugar every morning, Disp: 150 each, Rfl: 3    blood glucose test strips (ONETOUCH ULTRA) strip, Check blood sugar every morning, Disp: 150 each, Rfl: 3    ketoconazole (NIZORAL) 2 % cream, Apply topically daily. , Disp: 60 g, Rfl: 5    metroNIDAZOLE, TOPICAL, 0.75 % LOTN, Apply to face BID, Disp: 3 Bottle, Rfl: 3    triamcinolone (KENALOG) 0.1 % cream, Apply bid to affected areas prn, Disp: 1200 g, Rfl: 3    vitamin D (ERGOCALCIFEROL) 24008 units CAPS capsule, Take 1 capsule by mouth once a week, Disp: 12 capsule, Rfl: 1 Handicap Placard MISC, by Does not apply route Unable to ambulate more than 60 feet without difficulty Expires 10/31/2022, Disp: 1 each, Rfl: 0  Allergies   Allergen Reactions    Iodine      fish allergy     Social History     Socioeconomic History    Marital status:      Spouse name: Not on file    Number of children: Not on file    Years of education: Not on file    Highest education level: Not on file   Occupational History    Not on file   Tobacco Use    Smoking status: Never    Smokeless tobacco: Never   Vaping Use    Vaping Use: Never used   Substance and Sexual Activity    Alcohol use: Yes     Comment: social    Drug use: No    Sexual activity: Not Currently   Other Topics Concern    Not on file   Social History Narrative    Not on file     Social Determinants of Health     Financial Resource Strain: Low Risk     Difficulty of Paying Living Expenses: Not hard at all   Food Insecurity: No Food Insecurity    Worried About Running Out of Food in the Last Year: Never true    Ran Out of Food in the Last Year: Never true   Transportation Needs: Not on file   Physical Activity: Insufficiently Active    Days of Exercise per Week: 2 days    Minutes of Exercise per Session: 40 min   Stress: No Stress Concern Present    Feeling of Stress : Not at all   Social Connections: Not on file   Intimate Partner Violence: Not on file   Housing Stability: Not on file     Family History   Problem Relation Age of Onset    Early Death Mother            Physical Exam:    Temp 98 °F (36.7 °C)   Ht 5' 4\" (1.626 m)   Wt 215 lb (97.5 kg)   BMI 36.90 kg/m²     GENERAL: alert, appears stated age, cooperative, no acute distress    HEENT: Head is normocephalic, atraumatic. PERRLA. SKIN: Clean, dry, intact. There is not any cellulitis or cutaneous lesions noted in the lower extremities     PULMONARY: breathing is regular and unlabored, no acute distress     CV:  The bilateral lower extremities are warm and well-perfused with brisk capillary refill. 2+ pulses LE bilateral.     ABDOMINAL: Nontender, nondistended     PSYCHIATRY: Pleasant mood, appropriate behavior, follows commands     NEURO: Sensation is intact distally with light touch with no alteration. Motor exam of the lower extremities show quadriceps, hamstrings, foot dorsiflexion and plantarflexion grossly intact 5/5. LYMPH: No lymphedema present distally in upper or lower extremity. MUSCULOSKELETAL:  Examination of right knee reveals well-healed incisional scar of total knee replacement as well as right lateral thigh with removal of sutures and staples. No signs of infection or cellulitic process. Range of motion is limited but 5- 100 degrees. There is no varus valgus instability. Nontender along fracture. Patient was able to stand without assistance from chair height and stand without use of walker. However patient still used walker as an ambulatory aid. Imaging:  XR FEMUR RIGHT (MIN 2 VIEWS)    Result Date: 6/22/2022  EXAMINATION: 2 XRAY VIEWS OF THE RIGHT FEMUR 6/22/2022 7:31 am COMPARISON: 11 May 2022 HISTORY: ORDERING SYSTEM PROVIDED HISTORY: Periprosthetic fracture around internal prosthetic right knee joint, subsequent encounter FINDINGS: Proximal to distal the 5th fixation screw has fractured. The remainder of the femoral fixation hardware is intact. There is an anatomically aligned right TKA. There is increasing callus formation at the site of femur fracture. Counting proximal to distal, fracture of the 5th femoral fixation screw. Increasing femur callus formation. See above. XR FEMUR RIGHT (MIN 2 VIEWS)    Result Date: 7/20/2022  EXAMINATION: 2 XRAY VIEWS OF THE RIGHT FEMUR; 2 XRAY VIEWS OF THE RIGHT TIBIA AND FIBULA; TWO XRAY VIEWS OF THE RIGHT KNEE 7/20/2022 7:51 am COMPARISON: Right femur 22 June 2022.   Right tib fib 5 February 2021 HISTORY: ORDERING SYSTEM PROVIDED HISTORY: Periprosthetic fracture around internal prosthetic right knee joint, subsequent encounter FINDINGS: Right femur: Failure of fixation hardware with plate fracture at the level of the femoral stem tip. The closest screw to the plate fracture is also fractured. This is associated with an increasing angulation across the fracture plane vertex anteromedial.  Callus formation is similar to the prior study. Right knee and tib fib: Anatomically aligned right TKA with no new abnormal bone or soft tissue findings at the knee or at the tib fib. Failure of femoral fixation hardware with fixation plate and screw fracture as described above. Increasing fracture angulation vertex anterolateral. No new abnormal findings at the right knee or right tib fib. XR KNEE RIGHT (1-2 VIEWS)    Result Date: 7/20/2022  EXAMINATION: 2 XRAY VIEWS OF THE RIGHT FEMUR; 2 XRAY VIEWS OF THE RIGHT TIBIA AND FIBULA; TWO XRAY VIEWS OF THE RIGHT KNEE 7/20/2022 7:51 am COMPARISON: Right femur 22 June 2022. Right tib fib 5 February 2021 HISTORY: ORDERING SYSTEM PROVIDED HISTORY: Periprosthetic fracture around internal prosthetic right knee joint, subsequent encounter FINDINGS: Right femur: Failure of fixation hardware with plate fracture at the level of the femoral stem tip. The closest screw to the plate fracture is also fractured. This is associated with an increasing angulation across the fracture plane vertex anteromedial.  Callus formation is similar to the prior study. Right knee and tib fib: Anatomically aligned right TKA with no new abnormal bone or soft tissue findings at the knee or at the tib fib. Failure of femoral fixation hardware with fixation plate and screw fracture as described above. Increasing fracture angulation vertex anterolateral. No new abnormal findings at the right knee or right tib fib.      XR TIBIA FIBULA RIGHT (2 VIEWS)    Result Date: 7/20/2022  EXAMINATION: 2 XRAY VIEWS OF THE RIGHT FEMUR; 2 XRAY VIEWS OF THE RIGHT TIBIA AND FIBULA; TWO XRAY VIEWS OF THE RIGHT KNEE to by her operative surgeon. Previous radiographs did show some stress fatiguing of the proximal screw but now presents radiographically with failure of the main plate. Patient states that she no longer wants any further surgery since she has had so many on the right lower extremity in her past.  Patient was counseled on continued healing potential.  Fortunately patient did have enough healing over femur to bear weight. However patient is still recommended to follow-up with orthopedic trauma surgery to explore her options. In a 15 minute assessment and discussion, patient was counseled on weight loss, healthy diet, and physical activity relating to this condition. She was educated with options in detail including nutrition, joining a health club/ weight loss program, and use of cardio equipment such as the Arc Trainer and the importance of use as well as range of motion and HEP exercises for weight loss and general health. Margarito Vides DO             25 minutes was spent with patient. 50% or greater was spent counseling the patient.

## 2022-07-21 ENCOUNTER — PATIENT MESSAGE (OUTPATIENT)
Dept: FAMILY MEDICINE CLINIC | Age: 67
End: 2022-07-21

## 2022-07-21 NOTE — TELEPHONE ENCOUNTER
From: Jaziel Chen  To: Dr. Bejarano Mention  Sent: 7/19/2022 12:11 PM EDT  Subject: Report    Hi, Jazzmine Single is coming in tomorrow. Can you please give him a copy of my mammogram report.  Thanks

## 2022-07-24 NOTE — TELEPHONE ENCOUNTER
From: Caleb Grijalva  To: Dr. Amando Anna  Sent: 7/21/2022 3:25 PM EDT  Subject: Thyroid    Hi, I think I am reading my blood work results correctly.  My thyroid was normal?

## 2022-07-26 ENCOUNTER — TELEPHONE (OUTPATIENT)
Dept: ORTHOPEDIC SURGERY | Age: 67
End: 2022-07-26

## 2022-07-26 NOTE — TELEPHONE ENCOUNTER
Call placed to patient, appointment made at this time. Future Appointments   Date Time Provider Amado Gustafson   8/17/2022  9:00 AM DO Mel Mendoza [facility]   8/18/2022 10:15 AM Karma Diaz DO [facility]   11/28/2022  8:30 AM Archana Stafford MD [facility]     Directions to office provided and patient verbalized understanding and is agreeable with plan.

## 2022-07-26 NOTE — TELEPHONE ENCOUNTER
Referral # [id number]  From Dr. Megan Rico, being sent to [facility] navigator due to urgent nature of diagnosis. Associated Diagnosis: Periprosthetic fracture around internal prosthetic right knee joint, subsequent encounter (M97.11XD [ICD-10-CM]); Painful orthopaedic hardware ([address] (T84.84XA [ICD-10-CM])    Right knee and tib fib: Anatomically aligned right TKA with no new abnormal bone or soft tissue findings at the knee or at the tib fib. Failure of femoral fixation hardware with fixation plate and screw fracture as described above. Increasing fracture angulation vertex anterolateral. Patient states that she no longer wants any further surgery since she has had so many on the right lower extremity in her past. Patient appears to have had failure of hardware and she [place] now [place]. Routed for consideration and guidance.

## 2022-08-15 DIAGNOSIS — M97.11XD PERIPROSTHETIC FRACTURE AROUND INTERNAL PROSTHETIC RIGHT KNEE JOINT, SUBSEQUENT ENCOUNTER: Primary | ICD-10-CM

## 2022-08-17 ENCOUNTER — OFFICE VISIT (OUTPATIENT)
Dept: ORTHOPEDIC SURGERY | Age: 67
End: 2022-08-17
Payer: MEDICARE

## 2022-08-17 VITALS — BODY MASS INDEX: 35.82 KG/M2 | HEIGHT: 65 IN | WEIGHT: 215 LBS | TEMPERATURE: 98 F

## 2022-08-17 DIAGNOSIS — M97.11XD PERIPROSTHETIC FRACTURE AROUND INTERNAL PROSTHETIC RIGHT KNEE JOINT, SUBSEQUENT ENCOUNTER: Primary | ICD-10-CM

## 2022-08-17 DIAGNOSIS — T84.84XA PAINFUL ORTHOPAEDIC HARDWARE (HCC): ICD-10-CM

## 2022-08-17 DIAGNOSIS — Z78.9 DECREASED INDEPENDENCE WITH ACTIVITIES OF DAILY LIVING: ICD-10-CM

## 2022-08-17 DIAGNOSIS — Z91.81 HISTORY OF RECENT FALL: ICD-10-CM

## 2022-08-17 PROCEDURE — 3017F COLORECTAL CA SCREEN DOC REV: CPT | Performed by: ORTHOPAEDIC SURGERY

## 2022-08-17 PROCEDURE — G8417 CALC BMI ABV UP PARAM F/U: HCPCS | Performed by: ORTHOPAEDIC SURGERY

## 2022-08-17 PROCEDURE — 99214 OFFICE O/P EST MOD 30 MIN: CPT | Performed by: ORTHOPAEDIC SURGERY

## 2022-08-17 PROCEDURE — 1036F TOBACCO NON-USER: CPT | Performed by: ORTHOPAEDIC SURGERY

## 2022-08-17 PROCEDURE — G8427 DOCREV CUR MEDS BY ELIG CLIN: HCPCS | Performed by: ORTHOPAEDIC SURGERY

## 2022-08-17 PROCEDURE — 1090F PRES/ABSN URINE INCON ASSESS: CPT | Performed by: ORTHOPAEDIC SURGERY

## 2022-08-17 PROCEDURE — 1123F ACP DISCUSS/DSCN MKR DOCD: CPT | Performed by: ORTHOPAEDIC SURGERY

## 2022-08-17 PROCEDURE — G8400 PT W/DXA NO RESULTS DOC: HCPCS | Performed by: ORTHOPAEDIC SURGERY

## 2022-08-17 NOTE — PROGRESS NOTES
Chief Complaint   Patient presents with    Leg Injury     Rt leg peripostheic fx around internal prosthetic rt knee joint. Ambulation with wheeled walker. Doing better, finished therapy. HPI:    Patient is 79 y.o. female here today for continued follow-up of Right femur ORIF from injury on 3/12/2022. She missed bottom step and fell. Surgery on 3/14/2022 in [place]. She was there for additional 2 weeks before coming home. Has been in PT for the past few weeks and doing well, not much pain. Patient is ambulating with walker. On follow-up last visit, patient states she she was doing well and had gone to several stores the previous Sunday night when walking around without the use of ambulatory aids. However later that day she sat down and felt a pop in her right knee. Patient has little to no pain but states she is a little more weak since that time. Patient presents today clinic ambulating with walker still. ROS:    Skin: (-) rash,(-) psoriasis,(-) eczema, (-)skin cancer. Neurologic: (-)numbness, (-)tingling, (-)headaches, (-) LOC. Cardiovascular: (-) Chest pain, (-) swelling in legs/feet, (-) SOB, (-) cramping in legs/feet with walking.     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    Overview:  [facility] [address] Rx, 2001    Chronic nonalcoholic liver disease 83/55/6052    Diabetes ([facility] [address] 2019    Fracture of right lower extremity     Hyperlipidemia     Hypertension     Hypothyroidism     Malignant neoplasm of ovary ([facility] [address] 2001    Obesity     Positive FIT (fecal immunochemical test) 04/26/2018    Type II or unspecified type diabetes mellitus without mention of complication, not stated as uncontrolled     Unspecified vitamin D deficiency 01/14/2016     Past Surgical History:   Procedure Laterality Date    COLONOSCOPY      FEMUR FRACTURE SURGERY Right 8/31/2022    RIGHT FEMUR  REVISION OPEN REDUCTION INTERNAL FIXATION . LEFT SHOULDER STEROID INJECTION. performed by Ina Lenz DO at [facility] (CERVIX STATUS UNKNOWN)      JOINT REPLACEMENT      total knees bilateral    KNEE SURGERY Bilateral 2006/ 2009    [facility] COLONOSCOPY FLX DX W/COLLJ SPEC WHEN PFRMD N/A 5/30/2018    COLONOSCOPY performed by Nandini Burdick MD at [place] [address]       Current Outpatient Medications:     metFORMIN (GLUCOPHAGE) 1000 MG tablet, Take 1 tablet by mouth 2 times daily (with meals), Disp: 180 tablet, Rfl: 1    atorvastatin (LIPITOR) 20 MG tablet, TAKE 1 TABLET DAILY, Disp: 90 tablet, Rfl: 3    levothyroxine (SYNTHROID) 200 MCG tablet, Take 1 tablet by mouth Daily, Disp: 132 tablet, Rfl: 1    levothyroxine (SYNTHROID) 150 MCG tablet, Take 1 tablet by mouth Twice a Week, Disp: 90 tablet, Rfl: 1    allopurinol (ZYLOPRIM) 100 MG tablet, take 1 tablet by mouth once daily, Disp: 90 tablet, Rfl: 1    losartan-hydroCHLOROthiazide (HYZAAR) 100-25 MG per tablet, take 1 tablet by mouth once daily, Disp: 90 tablet, Rfl: 1    clindamycin (CLEOCIN T) 1 % lotion, apply to face twice a day, Disp: , Rfl:     CALCIUM 600+D3 600-800 MG-UNIT TABS, TAKE 1 TABLET BY MOUTH TWICE DAILY WITH BREAKFAST AND SUPPER, Disp: , Rfl:     Ascorbic Acid (VITAMIN C) 1000 MG tablet, TAKE 1 TABLET BY MOUTH DAILY FOR 30 DAYS, Disp: , Rfl:     Blood Glucose Monitoring Suppl (ONE TOUCH ULTRA 2) w/Device KIT, Check blood sugar qam, Disp: 1 kit, Rfl: 0    ONE TOUCH ULTRASOFT LANCETS MISC, Check blood sugar every morning, Disp: 150 each, Rfl: 3    blood glucose test strips (ONETOUCH ULTRA) strip, Check blood sugar every morning, Disp: 150 each, Rfl: 3    ketoconazole (NIZORAL) 2 % cream, Apply topically daily. , Disp: 60 g, Rfl: 5    metroNIDAZOLE, TOPICAL, 0.75 % LOTN, Apply to face BID, Disp: 3 Bottle, Rfl: 3    triamcinolone (KENALOG) 0.1 % cream, Apply bid to affected areas prn, Disp: 1200 g, Rfl: 3    vitamin D (ERGOCALCIFEROL) 37024 units CAPS Physical Exam:    Temp 98 °F (36.7 °C)   Ht 5' 5\" (1.651 m)   Wt 215 lb (97.5 kg)   BMI 35.78 kg/m²     GENERAL: alert, appears stated age, cooperative, no acute distress    HEENT: Head is normocephalic, atraumatic. PERRLA. SKIN: Clean, dry, intact. There is not any cellulitis or cutaneous lesions noted in the lower extremities     PULMONARY: breathing is regular and unlabored, no acute distress     CV: The bilateral lower extremities are warm and well-perfused with brisk capillary refill. 2+ pulses LE bilateral.     ABDOMINAL: Nontender, nondistended     PSYCHIATRY: Pleasant mood, appropriate behavior, follows commands     NEURO: Sensation is intact distally with light touch with no alteration. Motor exam of the lower extremities show quadriceps, hamstrings, foot dorsiflexion and plantarflexion grossly intact 5/5. LYMPH: No lymphedema present distally in upper or lower extremity. MUSCULOSKELETAL:  Examination of right knee reveals well-healed incisional scar of total knee replacement as well as right lateral thigh with removal of sutures and staples. No signs of infection or cellulitic process. Range of motion is limited but 5- 100 degrees. There is no varus valgus instability. Nontender along fracture. Patient was able to stand without assistance from chair height and stand without use of walker. However patient still used walker as an ambulatory aid. no change since last visit. Imaging:  XR FEMUR RIGHT (MIN 2 VIEWS)    Result Date: 6/22/2022  EXAMINATION: 2 XRAY VIEWS OF THE RIGHT FEMUR 6/22/2022 7:31 am COMPARISON: 11 May 2022 HISTORY: ORDERING SYSTEM PROVIDED HISTORY: Periprosthetic fracture around internal prosthetic right knee joint, subsequent encounter FINDINGS: Proximal to distal the 5th fixation screw has fractured. The remainder of the femoral fixation hardware is intact. There is an anatomically aligned right TKA.   There is increasing callus formation at the site of femur fracture. Counting proximal to distal, fracture of the 5th femoral fixation screw. Increasing femur callus formation. See above. XR FEMUR RIGHT (MIN 2 VIEWS)    Result Date: 7/20/2022  EXAMINATION: 2 XRAY VIEWS OF THE RIGHT FEMUR; 2 XRAY VIEWS OF THE RIGHT TIBIA AND FIBULA; TWO XRAY VIEWS OF THE RIGHT KNEE 7/20/2022 7:51 am COMPARISON: Right femur 22 June 2022. Right tib fib 5 February 2021 HISTORY: ORDERING SYSTEM PROVIDED HISTORY: Periprosthetic fracture around internal prosthetic right knee joint, subsequent encounter FINDINGS: Right femur: Failure of fixation hardware with plate fracture at the level of the femoral stem tip. The closest screw to the plate fracture is also fractured. This is associated with an increasing angulation across the fracture plane vertex anteromedial.  Callus formation is similar to the prior study. Right knee and tib fib: Anatomically aligned right TKA with no new abnormal bone or soft tissue findings at the knee or at the tib fib. Failure of femoral fixation hardware with fixation plate and screw fracture as described above. Increasing fracture angulation vertex anterolateral. No new abnormal findings at the right knee or right tib fib. XR KNEE RIGHT (1-2 VIEWS)    Result Date: 7/20/2022  EXAMINATION: 2 XRAY VIEWS OF THE RIGHT FEMUR; 2 XRAY VIEWS OF THE RIGHT TIBIA AND FIBULA; TWO XRAY VIEWS OF THE RIGHT KNEE 7/20/2022 7:51 am COMPARISON: Right femur 22 June 2022. Right tib fib 5 February 2021 HISTORY: ORDERING SYSTEM PROVIDED HISTORY: Periprosthetic fracture around internal prosthetic right knee joint, subsequent encounter FINDINGS: Right femur: Failure of fixation hardware with plate fracture at the level of the femoral stem tip. The closest screw to the plate fracture is also fractured. This is associated with an increasing angulation across the fracture plane vertex anteromedial.  Callus formation is similar to the prior study.  Right knee and tib fib: Anatomically aligned right TKA with no new abnormal bone or soft tissue findings at the knee or at the tib fib. Failure of femoral fixation hardware with fixation plate and screw fracture as described above. Increasing fracture angulation vertex anterolateral. No new abnormal findings at the right knee or right tib fib. XR TIBIA FIBULA RIGHT (2 VIEWS)    Result Date: 7/20/2022  EXAMINATION: 2 XRAY VIEWS OF THE RIGHT FEMUR; 2 XRAY VIEWS OF THE RIGHT TIBIA AND FIBULA; TWO XRAY VIEWS OF THE RIGHT KNEE 7/20/2022 7:51 am COMPARISON: Right femur 22 June 2022. Right tib fib 5 February 2021 HISTORY: ORDERING SYSTEM PROVIDED HISTORY: Periprosthetic fracture around internal prosthetic right knee joint, subsequent encounter FINDINGS: Right femur: Failure of fixation hardware with plate fracture at the level of the femoral stem tip. The closest screw to the plate fracture is also fractured. This is associated with an increasing angulation across the fracture plane vertex anteromedial.  Callus formation is similar to the prior study. Right knee and tib fib: Anatomically aligned right TKA with no new abnormal bone or soft tissue findings at the knee or at the tib fib. Failure of femoral fixation hardware with fixation plate and screw fracture as described above. Increasing fracture angulation vertex anterolateral. No new abnormal findings at the right knee or right tib fib. Mila Hernandes was seen today for leg injury. Diagnoses and all orders for this visit:    Periprosthetic fracture around internal prosthetic right knee joint, subsequent encounter    Painful orthopaedic hardware [facility])    History of recent fall    Decreased independence with activities of daily living          Patient seen and examined. Imaging reviewed with patient in detail.   Natural history and course discussed with patient in long discussion  Treatment options discussed with patient in detail including risks and benefits. Patient should do well with continued conservative management. Patient will continue postoperative protocol set forth by operating surgeon. Patient was educated about implant and long-term natural history and healing process and times. Patient appears to have had failure of hardware and she is now over 3 [place]. Patient had been ambulating on her lower extremity since surgery while she was in [place] she had been instructed to by her operative surgeon. Previous radiographs did show some stress fatiguing of the proximal screw but now presents radiographically with failure of the main plate. Patient states that she no longer wants any further surgery since she has had so many on the right lower extremity in her past.  Patient was counseled on continued healing potential.  Fortunately patient did have enough healing over femur to bear weight. However patient is still recommended to follow-up with orthopedic trauma surgery to explore her options. In a 15 minute assessment and discussion, patient was counseled on weight loss, healthy diet, and physical activity relating to this condition. She was educated with options in detail including nutrition, joining a health club/ weight loss program, and use of cardio equipment such as the Arc Trainer and the importance of use as well as range of motion and HEP exercises for weight loss and general health. Lea Martin DO             25 minutes was spent with patient. 50% or greater was spent counseling the patient.

## 2022-08-18 ENCOUNTER — PREP FOR PROCEDURE (OUTPATIENT)
Dept: ORTHOPEDIC SURGERY | Age: 67
End: 2022-08-18

## 2022-08-18 ENCOUNTER — TELEPHONE (OUTPATIENT)
Dept: ORTHOPEDIC SURGERY | Age: 67
End: 2022-08-18

## 2022-08-18 ENCOUNTER — OFFICE VISIT (OUTPATIENT)
Dept: ORTHOPEDIC SURGERY | Age: 67
End: 2022-08-18
Payer: MEDICARE

## 2022-08-18 DIAGNOSIS — Z01.818 PRE-OP TESTING: Primary | ICD-10-CM

## 2022-08-18 DIAGNOSIS — M97.11XD PERIPROSTHETIC FRACTURE AROUND INTERNAL PROSTHETIC RIGHT KNEE JOINT, SUBSEQUENT ENCOUNTER: Primary | ICD-10-CM

## 2022-08-18 PROBLEM — E78.5 HYPERLIPIDEMIA, UNSPECIFIED: Status: ACTIVE | Noted: 2018-12-18

## 2022-08-18 PROCEDURE — 1123F ACP DISCUSS/DSCN MKR DOCD: CPT | Performed by: ORTHOPAEDIC SURGERY

## 2022-08-18 PROCEDURE — G8417 CALC BMI ABV UP PARAM F/U: HCPCS | Performed by: ORTHOPAEDIC SURGERY

## 2022-08-18 PROCEDURE — 99203 OFFICE O/P NEW LOW 30 MIN: CPT | Performed by: ORTHOPAEDIC SURGERY

## 2022-08-18 PROCEDURE — 1090F PRES/ABSN URINE INCON ASSESS: CPT | Performed by: ORTHOPAEDIC SURGERY

## 2022-08-18 PROCEDURE — G8400 PT W/DXA NO RESULTS DOC: HCPCS | Performed by: ORTHOPAEDIC SURGERY

## 2022-08-18 PROCEDURE — 1036F TOBACCO NON-USER: CPT | Performed by: ORTHOPAEDIC SURGERY

## 2022-08-18 PROCEDURE — G8427 DOCREV CUR MEDS BY ELIG CLIN: HCPCS | Performed by: ORTHOPAEDIC SURGERY

## 2022-08-18 PROCEDURE — 99204 OFFICE O/P NEW MOD 45 MIN: CPT | Performed by: ORTHOPAEDIC SURGERY

## 2022-08-18 PROCEDURE — 3017F COLORECTAL CA SCREEN DOC REV: CPT | Performed by: ORTHOPAEDIC SURGERY

## 2022-08-18 RX ORDER — SODIUM CHLORIDE, SODIUM LACTATE, POTASSIUM CHLORIDE, CALCIUM CHLORIDE 600; 310; 30; 20 MG/100ML; MG/100ML; MG/100ML; MG/100ML
INJECTION, SOLUTION INTRAVENOUS CONTINUOUS
Status: CANCELLED | OUTPATIENT
Start: 2022-08-18

## 2022-08-18 RX ORDER — SODIUM CHLORIDE 9 MG/ML
INJECTION, SOLUTION INTRAVENOUS PRN
Status: CANCELLED | OUTPATIENT
Start: 2022-08-18

## 2022-08-18 RX ORDER — SODIUM CHLORIDE 0.9 % (FLUSH) 0.9 %
5-40 SYRINGE (ML) INJECTION EVERY 12 HOURS SCHEDULED
Status: CANCELLED | OUTPATIENT
Start: 2022-08-18

## 2022-08-18 RX ORDER — SODIUM CHLORIDE 0.9 % (FLUSH) 0.9 %
5-40 SYRINGE (ML) INJECTION PRN
Status: CANCELLED | OUTPATIENT
Start: 2022-08-18

## 2022-08-18 NOTE — PATIENT INSTRUCTIONS
You will need to be medically cleared before surgery by your family doctor. Please call your doctor to set up an appointment for medical clearance as soon as possible and have the office fax your medical clearance to :   (FAX) [phone]   ATTN:  NITIN    1. Your surgery is scheduled for Revision Right Femur ORIF on Wednesday 8/31/22 at 7:00 AM  with Dr. Mahi Desai, DO at the main [address] on [facility] in [facility] . You will need to report to Preop area  that morning at 5:00 AM    2. You are having Inpatient surgery so you will be returning home the same day  3. Preadmission Testing (PAT) department at [facility] will contact you with all the details prior to surgery. 4. Nothing to eat or drink after midnight the night before surgery. You may take a pain pill and any other medicine PAT instructs you to take with small sip of water if needed. 5. Pre op lab work  6. Continue with ice and elevation to reduce swelling  7. Weightbearing as tolerated   8. Take pain medicine as instructed  9. Call office with any question or concerns: [phone]. Hold ASA the day of surgery. Hold all NSAIDs 7 days prior to surgery    All surgical patients will be gradually tapered off narcotic pain medication post operatively, this office will not continue narcotics longer than 6 weeks from date of surgery. If narcotics are required longer than this time period without objective finding you will be referred to pain management. After Fracture Nutrition Recommendations:  After a fracture, your bone needs to rebuild. A healthy, well-balanced diet rich in key nutrients can help speed that up. You don't need to take supplements unless your doctor recommends it. They don't always work well. It's much better to get the nutrition you need from your plate, not from a pill. Protein  About half your bone's structure is made of this. When you have a fracture, your body needs it to build new bone for the repair.  It also helps your body take in and use calcium, another key nutrient for healthy bones. Good sources: Meat, fish, milk, cheese, cottage cheese, yogurt, nuts, seeds, beans, soy products, and fortified cereals. Calcium  This mineral also helps you build strong bones, so foods and drinks rich in it can help your bone fracture heal. Adults should get between 1,000 and 1,200 milligrams of calcium each day. Your doctor will tell you if you need a calcium supplement, and what amount you should take if you do. Good sources: Milk, yogurt, cheese, cottage cheese, broccoli, turnip or ginette greens, kale, bok gwen, soy, beans, canned tuna or salmon with bones, almond milk, and fortified cereals or juice. Vitamin D  This vitamin should be a part of your diet to help your fracture heal. It helps your blood take in and use calcium and build up the minerals in your bones. You get some vitamin D when sunlight hits your skin, so it can be a good idea to spend a short amount of time outdoors each day -- 15 minutes may be enough for a fair-skinned person. Vitamin D is found naturally in only a few foods like egg yolks and fatty fish, but manufacturers add it to other foods, like milk or orange juice. Adults should get at least 600 IU of vitamin D every day, and if you're over 70 you should get at least 800 IU. Good sources: Swordfish, salmon, cod liver oil, sardines, liver, fortified milk or yogurt, egg yolks, and fortified orange juice. Vitamin C  Collagen is a protein that's an important building block for bone. Vitamin C helps your body make collagen, which helps your bone fracture heal. You can get it from many tasty, fresh fruits and veggies. Aged or heated produce can lose some of its vitamin C, so go for fresh or frozen. Good sources: Citrus fruits like oranges, kiwi fruit, berries, tomatoes, peppers, potatoes, and green vegetables.     Iron  If you have iron-deficiency anemia -- when you don't have enough healthy red blood cells -- you may heal more slowly after a fracture. Iron helps your body make collagen to rebuild bone. It also plays a part in getting oxygen into your bones to help them heal.    Good sources: Red meat, dark-meat chicken or turkey, oily fish, eggs, dried fruits, leafy green veggies, whole-grain breads, and fortified cereals. Potassium  Get enough of this mineral in your diet, and you won't lose as much calcium when you pee. There are lots of fresh fruits rich in potassium. Good sources: Bananas, orange juice, potatoes, nuts, seeds, fish, meat, and milk. What Not to Eat  It's a good idea to cut back on or skip these:    Alcohol: While you don't have to cut out alcoholic drinks, these beverages slow down bone healing. You won't build new bone as fast to fix the fracture. A bit too much alcohol can also make you unsteady on your feet, which can make you more likely to fall and risk an injury to the same bone. Salt: Too much of this in your diet can make you lose more calcium in your urine. Salt can be in some foods or drinks that don't taste salty, so check labels and aim for about 1 teaspoon, or 6 grams, a day. Coffee: Lots of caffeine -- more than four cups of strong coffee a day -- can slow down bone healing a little. It might make you pee more, and that could mean you lose more calcium through your urine. A moderate amount of coffee or tea should be fine.   Arian Mendoza

## 2022-08-18 NOTE — PROGRESS NOTES
Will have EKG for surgery with her PCP. Patient informed she needs a medical clearance as well., forms were faxed to her doctor by Cely.

## 2022-08-18 NOTE — TELEPHONE ENCOUNTER
Prior Authorization Form:    DEMOGRAPHICS:                     Patient Name:  Eliseo Mendez  Patient :  1955            Insurance:  Payor: MEDICARE / Plan: MEDICARE PART A AND B / Product Type: *No Product type* /   Insurance ID Number:    Payer/Plan Subscr  Sex Relation Sub. Ins. ID Effective Group Num   1. [address] [facility] 1955 Female Self [id number] 20                                    [address] [address]   2. [address] [facility] 1955 Female Self [id number] 20 plan G                                   [address]       [] Prior authorization obtained    DIAGNOSIS & PROCEDURE:                       Procedure/Operation: Revision ORIF Right Femur due to Prosthetic Fracture           CPT Code: 98488    Diagnosis:  Periprosthetic Fracture around Internal Prosthetic of Right Knee Joint    ICD10 Code: G34. 11XD    Location:  [address] [place] [address] [place]    Surgeon:  Dr. Elijio Oppenheim INFORMATION:                          Date: 2022   Time: 7 am              Anesthesia:  General                                                       Status:  Inpatient        Special Comments:         Vendor: [employer]  [x]   Notified     Length of Surgery (with 30 min clean up time): 1.5 hours    Medical clearance: Medical Clearance Requested    Pre-Op Labs:       []  Orders Placed    Electronically signed by Jose Perez MA on 2022 at 12:29 PM

## 2022-08-18 NOTE — PROGRESS NOTES
Donny Nunez a 79year old female presents today as a new patient referred by Dr Mallory Figueredo for right leg pain. Surgery with hardware while in [place]. [place].  Concerned that it is loosening. Xrays in Epic by Dr Mallory Figueredo    She presents with a wheeled walker.  in room. States she has right femur pain with transitions from sit to stand. States \"the plate broke about a month ago while walking. \"    Concerned with whether it will cause pain from the broken hardware.

## 2022-08-22 ENCOUNTER — PREP FOR PROCEDURE (OUTPATIENT)
Dept: ORTHOPEDIC SURGERY | Age: 67
End: 2022-08-22

## 2022-08-22 ENCOUNTER — TELEPHONE (OUTPATIENT)
Dept: ADMINISTRATIVE | Age: 67
End: 2022-08-22

## 2022-08-22 ENCOUNTER — PATIENT MESSAGE (OUTPATIENT)
Dept: FAMILY MEDICINE CLINIC | Age: 67
End: 2022-08-22

## 2022-08-22 ENCOUNTER — OFFICE VISIT (OUTPATIENT)
Dept: FAMILY MEDICINE CLINIC | Age: 67
End: 2022-08-22
Payer: MEDICARE

## 2022-08-22 VITALS
HEART RATE: 75 BPM | BODY MASS INDEX: 35.78 KG/M2 | SYSTOLIC BLOOD PRESSURE: 136 MMHG | DIASTOLIC BLOOD PRESSURE: 84 MMHG | RESPIRATION RATE: 20 BRPM | OXYGEN SATURATION: 98 % | HEIGHT: 65 IN

## 2022-08-22 DIAGNOSIS — M25.512 CHRONIC LEFT SHOULDER PAIN: ICD-10-CM

## 2022-08-22 DIAGNOSIS — G89.29 CHRONIC LEFT SHOULDER PAIN: ICD-10-CM

## 2022-08-22 DIAGNOSIS — Z01.810 PREOP CARDIOVASCULAR EXAM: Primary | ICD-10-CM

## 2022-08-22 PROCEDURE — G8400 PT W/DXA NO RESULTS DOC: HCPCS | Performed by: FAMILY MEDICINE

## 2022-08-22 PROCEDURE — G8427 DOCREV CUR MEDS BY ELIG CLIN: HCPCS | Performed by: FAMILY MEDICINE

## 2022-08-22 PROCEDURE — 1036F TOBACCO NON-USER: CPT | Performed by: FAMILY MEDICINE

## 2022-08-22 PROCEDURE — 99214 OFFICE O/P EST MOD 30 MIN: CPT | Performed by: FAMILY MEDICINE

## 2022-08-22 PROCEDURE — 1123F ACP DISCUSS/DSCN MKR DOCD: CPT | Performed by: FAMILY MEDICINE

## 2022-08-22 PROCEDURE — 93000 ELECTROCARDIOGRAM COMPLETE: CPT | Performed by: FAMILY MEDICINE

## 2022-08-22 PROCEDURE — G8417 CALC BMI ABV UP PARAM F/U: HCPCS | Performed by: FAMILY MEDICINE

## 2022-08-22 PROCEDURE — 1090F PRES/ABSN URINE INCON ASSESS: CPT | Performed by: FAMILY MEDICINE

## 2022-08-22 PROCEDURE — 3017F COLORECTAL CA SCREEN DOC REV: CPT | Performed by: FAMILY MEDICINE

## 2022-08-22 ASSESSMENT — ENCOUNTER SYMPTOMS: COUGH: 0

## 2022-08-22 NOTE — TELEPHONE ENCOUNTER
Call placed to patient, LVM, appointment tentatively made at this time.     Future Appointments   Date Time Provider Amado Gustafson   8/25/2022  9:30 AM Shahla Bailey [facility]   9/19/2022 12:30 PM SCHEDULE, SE [facility]  [facility]   10/17/2022 12:30 PM SCHEDULE, SE ORTHO APC  Ortho [facility]   11/17/2022  1:30 PM Shahla Bailey [facility]   11/28/2022  8:30 AM [place] MD [facility]

## 2022-08-22 NOTE — TELEPHONE ENCOUNTER
Pt said that she would like to have an appt with Dr Naida Sierra before her surgery, so he can explain to her what he is going to do. She said that he did explain it at her last visit, but she was shocked that she needed surgery, so she forgot what he said when he explained her procedure.

## 2022-08-22 NOTE — PROGRESS NOTES
[address] [facility]   [address] [facility]   Isbael Magana MD     Patient: Audrey Irby Birth: 1955  Visit Date: 8/22/22    Opal Padilla is a 79y.o. year old female here today for   Chief Complaint   Patient presents with    Pre-op Exam     Rt leg surgery        HPI  Patient is here for surgery clearance for right femur revision per Dr. Guera Meng. Patient was referred by Dr. Neville Hudson due to complicated femur surgery earlier this year. Denies history of anesthesia complications. Denies chest pain or shortness of breath. Patient has had left shoulder pain that flared up recently. Has been using a walker at home the past few months and left shoulder has been hurting. Review of Systems   Respiratory:  Negative for cough. Cardiovascular:  Negative for chest pain. Musculoskeletal:  Negative for arthralgias. Gait instability     Past medical, surgical, social and/or family historyreviewed, updated as needed, and are non-contributory (unless otherwise stated). Medications, allergies, and problem list also reviewed and updated as needed in patient's record.      Current Outpatient Medications   Medication Sig Dispense Refill    indomethacin (INDOCIN SR) 75 MG extended release capsule Take 1 capsule by mouth 2 times daily (with meals) 180 capsule 3    metFORMIN (GLUCOPHAGE) 1000 MG tablet Take 1 tablet by mouth 2 times daily (with meals) 180 tablet 1    atorvastatin (LIPITOR) 20 MG tablet TAKE 1 TABLET DAILY 90 tablet 3    levothyroxine (SYNTHROID) 200 MCG tablet Take 1 tablet by mouth Daily 132 tablet 1    levothyroxine (SYNTHROID) 150 MCG tablet Take 1 tablet by mouth Twice a Week 90 tablet 1    allopurinol (ZYLOPRIM) 100 MG tablet take 1 tablet by mouth once daily 90 tablet 1    losartan-hydroCHLOROthiazide (HYZAAR) 100-25 MG per tablet take 1 tablet by mouth once daily 90 tablet 1    clindamycin (CLEOCIN T) 1 % lotion apply Cervical back: Neck supple. Right lower leg: No edema. Left lower leg: No edema. Skin:     General: Skin is warm and dry. Neurological:      Mental Status: She is alert and oriented to person, place, and time. ASSESSMENT/PLAN  Anup Awad was seen today for pre-op exam.    Diagnoses and all orders for this visit:    Preop cardiovascular exam  -     EKG 12 Lead: NSR, no ischemia        -     medically stable for upcoming surgery    Chronic left shoulder pain  -     XR SHOULDER LEFT (MIN 2 VIEWS); Future  -     indomethacin (INDOCIN SR) 75 MG extended release capsule; Take 1 capsule by mouth 2 times daily (with meals)        BMI was elevated today, and weight loss plan recommended is : conventional weight loss. /MyChart follow up if tests abnormal.    Return for scheduled appointment. or sooner if necessary. I have reviewed my findings and recommendations with Anup Awad.      Gela Yousif MD, M.D

## 2022-08-22 NOTE — TELEPHONE ENCOUNTER
Surgery set for 8/31/2022    Routing to providers to see if 8-23 vs 8-25 is more appropriate to bring patient in to talk with Dr. Bairon Govea. Routed to Providers for consideration and scheduling recommendations.

## 2022-08-23 ENCOUNTER — PATIENT MESSAGE (OUTPATIENT)
Dept: FAMILY MEDICINE CLINIC | Age: 67
End: 2022-08-23

## 2022-08-23 ENCOUNTER — HOSPITAL ENCOUNTER (OUTPATIENT)
Age: 67
Discharge: HOME | End: 2022-08-23
Payer: MEDICARE

## 2022-08-23 DIAGNOSIS — M97.11XD PERIPROSTHETIC FRACTURE AROUND INTERNAL PROSTHETIC RIGHT KNEE JOINT, SUBSEQUENT ENCOUNTER: ICD-10-CM

## 2022-08-23 DIAGNOSIS — Z01.818 PRE-OP TESTING: ICD-10-CM

## 2022-08-23 DIAGNOSIS — R74.8 ELEVATED ALKALINE PHOSPHATASE LEVEL: Primary | ICD-10-CM

## 2022-08-23 LAB
ABO/RH: NORMAL
ALBUMIN SERPL-MCNC: 4.2 G/DL (ref 3.5–5.2)
ALP BLD-CCNC: 345 U/L (ref 35–104)
ALT SERPL-CCNC: 31 U/L (ref 0–32)
ANION GAP SERPL CALCULATED.3IONS-SCNC: 12 MMOL/L (ref 7–16)
ANTIBODY SCREEN: NORMAL
AST SERPL-CCNC: 28 U/L (ref 0–31)
BILIRUB SERPL-MCNC: 0.4 MG/DL (ref 0–1.2)
BUN BLDV-MCNC: 21 MG/DL (ref 6–23)
C-REACTIVE PROTEIN: 1 MG/DL (ref 0–0.4)
CALCIUM SERPL-MCNC: 9.5 MG/DL (ref 8.6–10.2)
CHLORIDE BLD-SCNC: 104 MMOL/L (ref 98–107)
CO2: 26 MMOL/L (ref 22–29)
CREAT SERPL-MCNC: 0.9 MG/DL (ref 0.5–1)
GFR AFRICAN AMERICAN: >60
GFR NON-AFRICAN AMERICAN: >60 ML/MIN/1.73
GLUCOSE BLD-MCNC: 126 MG/DL (ref 74–99)
HBA1C MFR BLD: 6.7 % (ref 4–5.6)
HCT VFR BLD CALC: 36.4 % (ref 34–48)
HEMOGLOBIN: 11.1 G/DL (ref 11.5–15.5)
INR BLD: 1
MCH RBC QN AUTO: 24 PG (ref 26–35)
MCHC RBC AUTO-ENTMCNC: 30.5 % (ref 32–34.5)
MCV RBC AUTO: 78.8 FL (ref 80–99.9)
PDW BLD-RTO: 17.2 FL (ref 11.5–15)
PLATELET # BLD: 176 E9/L (ref 130–450)
PMV BLD AUTO: 12.7 FL (ref 7–12)
POTASSIUM SERPL-SCNC: 4.8 MMOL/L (ref 3.5–5)
PROTHROMBIN TIME: 11.6 SEC (ref 9.3–12.4)
RBC # BLD: 4.62 E12/L (ref 3.5–5.5)
SEDIMENTATION RATE, ERYTHROCYTE: 50 MM/HR (ref 0–20)
SODIUM BLD-SCNC: 142 MMOL/L (ref 132–146)
TOTAL PROTEIN: 7.6 G/DL (ref 6.4–8.3)
WBC # BLD: 3 E9/L (ref 4.5–11.5)

## 2022-08-23 PROCEDURE — 86850 RBC ANTIBODY SCREEN: CPT

## 2022-08-23 PROCEDURE — 80053 COMPREHEN METABOLIC PANEL: CPT

## 2022-08-23 PROCEDURE — 86900 BLOOD TYPING SEROLOGIC ABO: CPT

## 2022-08-23 PROCEDURE — 85610 PROTHROMBIN TIME: CPT

## 2022-08-23 PROCEDURE — 86901 BLOOD TYPING SEROLOGIC RH(D): CPT

## 2022-08-23 PROCEDURE — 85027 COMPLETE CBC AUTOMATED: CPT

## 2022-08-23 PROCEDURE — 86140 C-REACTIVE PROTEIN: CPT

## 2022-08-23 PROCEDURE — 36415 COLL VENOUS BLD VENIPUNCTURE: CPT

## 2022-08-23 PROCEDURE — 85651 RBC SED RATE NONAUTOMATED: CPT

## 2022-08-23 PROCEDURE — 83036 HEMOGLOBIN GLYCOSYLATED A1C: CPT

## 2022-08-23 RX ORDER — INDOMETHACIN 75 MG/1
75 CAPSULE, EXTENDED RELEASE ORAL 2 TIMES DAILY WITH MEALS
Qty: 180 CAPSULE | Refills: 3 | Status: SHIPPED
Start: 2022-08-23 | End: 2022-09-01

## 2022-08-23 NOTE — TELEPHONE ENCOUNTER
From: Margery Aase  To: Dr. Alexia Coronado  Sent: 8/22/2022 2:40 PM EDT  Subject: Shoulder    Can you please explain what my shoulder X-ray showed in language I can understand .  Thank you

## 2022-08-24 NOTE — PROGRESS NOTES
Orthopaedic New Patient Note        Prema Larios is a 79 y.o. female, her YOB: 1955 with the following history as recorded in [facility]:    Patient Active Problem List    Diagnosis Date Noted    Hyperlipidemia, unspecified 12/18/2018    Periprosthetic fracture of shaft of femur 04/14/2022    Rosacea 08/15/2021    Essential hypertension 02/08/2018    Idiopathic chronic gout without tophus 02/08/2018    Morbid obesity with BMI of 40.0-44.9, adult ([facility] [address] 08/03/2017    Hypothyroidism (acquired) 05/11/2015    Type 2 diabetes mellitus with hyperglycemia, without long-term current use of insulin (HCC) 05/11/2015     Current Outpatient Medications   Medication Sig Dispense Refill    metFORMIN (GLUCOPHAGE) 1000 MG tablet Take 1 tablet by mouth 2 times daily (with meals) 180 tablet 1    atorvastatin (LIPITOR) 20 MG tablet TAKE 1 TABLET DAILY 90 tablet 3    levothyroxine (SYNTHROID) 200 MCG tablet Take 1 tablet by mouth Daily 132 tablet 1    levothyroxine (SYNTHROID) 150 MCG tablet Take 1 tablet by mouth Twice a Week 90 tablet 1    allopurinol (ZYLOPRIM) 100 MG tablet take 1 tablet by mouth once daily 90 tablet 1    losartan-hydroCHLOROthiazide (HYZAAR) 100-25 MG per tablet take 1 tablet by mouth once daily 90 tablet 1    clindamycin (CLEOCIN T) 1 % lotion apply to face twice a day      CALCIUM 600+D3 600-800 MG-UNIT TABS TAKE 1 TABLET BY MOUTH TWICE DAILY WITH BREAKFAST AND SUPPER      Ascorbic Acid (VITAMIN C) 1000 MG tablet TAKE 1 TABLET BY MOUTH DAILY FOR 30 DAYS      Blood Glucose Monitoring Suppl (ONE TOUCH ULTRA 2) w/Device KIT Check blood sugar qam 1 kit 0    ONE TOUCH ULTRASOFT LANCETS MISC Check blood sugar every morning 150 each 3    blood glucose test strips (ONETOUCH ULTRA) strip Check blood sugar every morning 150 each 3    ketoconazole (NIZORAL) 2 % cream Apply topically daily.  60 g 5    metroNIDAZOLE, TOPICAL, 0.75 % LOTN Apply to face BID 3 Bottle 3    triamcinolone (KENALOG) 0.1 % cream Apply bid to affected areas prn 1200 g 3    vitamin D (ERGOCALCIFEROL) 61159 units CAPS capsule Take 1 capsule by mouth once a week 12 capsule 1    Handicap Placard MISC by Does not apply route Unable to ambulate more than 60 feet without difficulty  Expires 10/31/2022 1 each 0    diclofenac sodium (VOLTAREN) 1 % GEL Apply 2 g topically 4 times daily as needed for Pain 350 g 3    indomethacin (INDOCIN SR) 75 MG extended release capsule Take 1 capsule by mouth 2 times daily (with meals) 180 capsule 3     No current facility-administered medications for this visit. 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 [address] 2019    Fracture of right lower extremity     Hyperlipidemia     Hypertension     Hypothyroidism     Malignant neoplasm of ovary ([facility] [address] 2001    Obesity     Positive FIT (fecal immunochemical test) 04/26/2018    Type II or unspecified type diabetes mellitus without mention of complication, not stated as uncontrolled     Unspecified vitamin D deficiency 01/14/2016     Past Surgical History:   Procedure Laterality Date    COLONOSCOPY      HYSTERECTOMY (CERVIX STATUS UNKNOWN)      JOINT REPLACEMENT      total knees bilateral    KNEE SURGERY Bilateral 2006/ 2009    PA COLONOSCOPY FLX DX W/COLLJ SPEC WHEN PFRMD N/A 5/30/2018    COLONOSCOPY performed by Kurtis Naranjo MD at [facility] ENDOSCOPY     Family History   Problem Relation Age of Onset    Early Death Mother      Social History     Tobacco Use    Smoking status: Never    Smokeless tobacco: Never   Substance Use Topics    Alcohol use: Yes     Comment: social                           Review of Systems   Constitutional: Negative for fever and chills. HENT: Negative for ear pain, sore throat and sinus pressure. Eyes: Negative for pain, discharge and redness.    Respiratory: Negative for cough, shortness of breath and wheezing. Cardiovascular: Negative for chest pain. Gastrointestinal: Negative for nausea, vomiting, diarrhea and abdominal distention. Genitourinary: Negative for dysuria and frequency. Musculoskeletal: Negative for back pain and arthralgias. All other systems reviewed and negative. CC: Second opinion right femur fracture    S: Caleb Grijalva is a 79 y.o. who is here today for second opinion for her right leg. Patient has history of TKA in the past which she states she had in [email or web address]. States this required 2 revisions for loosening. She is really unclear exactly what caused the loosening. When asked about infection she denies any obvious infection about the knee but does states she did have a history of pneumonia and MRSA. Unsure if these were related to her knee whatsoever. Was recently down in [place] sustained mechanical fall and had surgery on 3/14/2022 for right distal femur periprosthetic fracture. She lateral plate and screw construct with cerclage cables. She was initially following up in [place] with Dr. Starr Quiet, fracture seem to be healing however there is interval loss of reduction in failure now patient has obvious clinical deformity to the limb and she is here today for further discussion on management. States she uses a walker to ambulate. States she has very mild pain but does have pain present to the fracture site. Feels that she is becoming more deconditioned due to her limited ability to ambulate. Does state that she was full weightbearing immediately after her initial surgery. States she felt that she was doing well and going out to the stores however couple weeks ago she sat down and felt a pop to the knee, did not notice any significant increase in her pain but did notice a decrease in the function to the limb. Patient is a type 2 diabetic. Physical Exam  There were no vitals taken for this visit.   O:   CONSTITUTIONAL: awake, alert, cooperative, no apparent distress, and appears stated age  EYES: Lids and lashes normal, pupils equal, round and reactive to light, extra ocular muscles intact, sclera clear, conjunctiva normal  ENT: Normocephalic, without obvious abnormality, atraumatic, external ears without lesions, oral pharynx with moist mucus membranes  NECK: Trachea midline, skin normal  LUNGS: No increased work of breathing, good air exchange  CARDIOVASCULAR: 2+ pulses throughout and capillary Refill less than 2 seconds  ABDOMEN: soft, non-distended, non-tender and no rebound tenderness or guarding  NEUROLOGIC: Awake, alert, oriented to name, place and time. Cranial nerves II-XII are grossly intact. Motor is 5 out of 5 bilaterally. Sensory is intact. Right Lower Extremity Exam:  Surgical incisions are well-healed, there is no swelling or erythema  There is mild pain with palpation over the fracture site of the distal femur  There is obvious gross deformity of the right distal thigh approximately 25 degrees varus angulation, 1 patient extends the knee there is crossover the contralateral leg at mid shin in a seated position  There is discomfort with attempted standing from seated position to the right thigh  Knee joint was without effusion, some global laxity about the previous TKA however does appear to have endpoints with varus and valgus stress, there is no tenderness to palpation along the tibia   Demonstrates active knee flexion/extension, active ROM 5-95, ankle plantar/dorsiflexion/great toe extension. States sensation intact to gross touch in sural/deep peroneal/superficial peroneal/saphenous/posterior tibial nerve distributions to foot/ankle. Palpable dorsalis pedis and posterior tibialis pulses, cap refill brisk in toes, foot warm/perfused.   Compartments supple throughout thigh and leg, calves supple/NT    Xrays Reviewed  Narrative   EXAMINATION:   4 XRAY VIEWS OF THE RIGHT FEMUR       8/17/2022 8:33 am       COMPARISON:   07/20/2022       HISTORY: treatment and what this would likely entail. Patient uncertain what she would like to do at this time, states she would like further time to think about this. Recommend continued protected weightbearing with her walker, she is to contact the office when she has made her decision on how she would like to progress moving forward. Electronically Signed By  Carla Villavicencio DO  8/18/22    NOTE: This report was transcribed using voice recognition software.  Every effort was made to ensure accuracy; however, inadvertent computerized transcription errors may be present

## 2022-08-25 ENCOUNTER — OFFICE VISIT (OUTPATIENT)
Dept: ORTHOPEDIC SURGERY | Age: 67
End: 2022-08-25
Payer: MEDICARE

## 2022-08-25 VITALS — HEIGHT: 65 IN | WEIGHT: 220 LBS | BODY MASS INDEX: 36.65 KG/M2

## 2022-08-25 DIAGNOSIS — M97.8XXA PERIPROSTHETIC FRACTURE OF SHAFT OF FEMUR: Primary | ICD-10-CM

## 2022-08-25 DIAGNOSIS — Z96.649 PERIPROSTHETIC FRACTURE OF SHAFT OF FEMUR: Primary | ICD-10-CM

## 2022-08-25 PROCEDURE — G8427 DOCREV CUR MEDS BY ELIG CLIN: HCPCS | Performed by: ORTHOPAEDIC SURGERY

## 2022-08-25 PROCEDURE — 99212 OFFICE O/P EST SF 10 MIN: CPT | Performed by: ORTHOPAEDIC SURGERY

## 2022-08-25 PROCEDURE — 1123F ACP DISCUSS/DSCN MKR DOCD: CPT | Performed by: ORTHOPAEDIC SURGERY

## 2022-08-25 PROCEDURE — G8400 PT W/DXA NO RESULTS DOC: HCPCS | Performed by: ORTHOPAEDIC SURGERY

## 2022-08-25 PROCEDURE — G8417 CALC BMI ABV UP PARAM F/U: HCPCS | Performed by: ORTHOPAEDIC SURGERY

## 2022-08-25 PROCEDURE — 1036F TOBACCO NON-USER: CPT | Performed by: ORTHOPAEDIC SURGERY

## 2022-08-25 PROCEDURE — 99214 OFFICE O/P EST MOD 30 MIN: CPT | Performed by: ORTHOPAEDIC SURGERY

## 2022-08-25 PROCEDURE — 1090F PRES/ABSN URINE INCON ASSESS: CPT | Performed by: ORTHOPAEDIC SURGERY

## 2022-08-25 PROCEDURE — 3017F COLORECTAL CA SCREEN DOC REV: CPT | Performed by: ORTHOPAEDIC SURGERY

## 2022-08-25 NOTE — H&P (VIEW-ONLY)
Check blood sugar every morning 150 each 3    ketoconazole (NIZORAL) 2 % cream Apply topically daily. 60 g 5    metroNIDAZOLE, TOPICAL, 0.75 % LOTN Apply to face BID 3 Bottle 3    triamcinolone (KENALOG) 0.1 % cream Apply bid to affected areas prn 1200 g 3    vitamin D (ERGOCALCIFEROL) 01922 units CAPS capsule Take 1 capsule by mouth once a week 12 capsule 1    Handicap Placard MISC by Does not apply route Unable to ambulate more than 60 feet without difficulty  Expires 10/31/2022 1 each 0     No current facility-administered medications for this visit. 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    Overview:  [facility] [address] Rx, 2001    Chronic nonalcoholic liver disease 15/13/1385    Diabetes ([facility] [address] 2019    Fracture of right lower extremity     Hyperlipidemia     Hypertension     Hypothyroidism     Malignant neoplasm of ovary ([facility] [address] 2001    Obesity     Positive FIT (fecal immunochemical test) 04/26/2018    Type II or unspecified type diabetes mellitus without mention of complication, not stated as uncontrolled     Unspecified vitamin D deficiency 01/14/2016     Past Surgical History:   Procedure Laterality Date    COLONOSCOPY      HYSTERECTOMY (CERVIX STATUS UNKNOWN)      JOINT REPLACEMENT      total knees bilateral    KNEE SURGERY Bilateral 2006/ 2009    AR COLONOSCOPY FLX DX W/COLLJ SPEC WHEN PFRMD N/A 5/30/2018    COLONOSCOPY performed by Nata Mazariegos MD at [facility] ENDOSCOPY     Family History   Problem Relation Age of Onset    Early Death Mother      Social History     Tobacco Use    Smoking status: Never    Smokeless tobacco: Never   Substance Use Topics    Alcohol use: Yes     Comment: social                           Review of Systems   Constitutional: Negative for fever and chills. HENT: Negative for ear pain, sore throat and sinus pressure. Eyes: Negative for pain, discharge and redness.    Respiratory: Negative for cough, shortness of breath and wheezing. Cardiovascular: Negative for chest pain. Gastrointestinal: Negative for nausea, vomiting, diarrhea and abdominal distention. Genitourinary: Negative for dysuria and frequency. Musculoskeletal: Negative for back pain and arthralgias. All other systems reviewed and negative. CC: Second opinion right femur fracture    S: Kortney Mart is a 79 y.o. who is here today for second opinion for her right leg. Patient has history of TKA in the past which she states she had in [place]. States this required 2 revisions for loosening. She is really unclear exactly what caused the loosening. When asked about infection she denies any obvious infection about the knee but does states she did have a history of pneumonia and MRSA. Unsure if these were related to her knee whatsoever. Was recently down [place] sustained mechanical fall and had surgery on 3/14/2022 for right distal femur periprosthetic fracture. She lateral plate and screw construct with cerclage cables. She was initially following up in [place] with Dr. Ayana Chahal, fracture seem to be healing however there is interval loss of reduction in failure now patient has obvious clinical deformity to the limb and she is here today for further discussion on management. States she uses a walker to ambulate. States she has very mild pain but does have pain present to the fracture site. Feels that she is becoming more deconditioned due to her limited ability to ambulate. Does state that she was full weightbearing immediately after her initial surgery. States she felt that she was doing well and going out to the stores however couple weeks ago she sat down and felt a pop to the knee, did not notice any significant increase in her pain but did notice a decrease in the function to the limb. Patient is a type 2 diabetic. During last office visit we discussed surgical intervention, patient wanted some time to think about this.   She presents today states she would like to proceed move forward with surgical treatment for her right femur.  again And is here today. Physical Exam  Ht 5' 5\" (1.651 m)   Wt 220 lb (99.8 kg)   BMI 36.61 kg/m²   O:   CONSTITUTIONAL: awake, alert, cooperative, no apparent distress, and appears stated age  EYES: Lids and lashes normal, pupils equal, round and reactive to light, extra ocular muscles intact, sclera clear, conjunctiva normal  ENT: Normocephalic, without obvious abnormality, atraumatic, external ears without lesions, oral pharynx with moist mucus membranes  NECK: Trachea midline, skin normal  LUNGS: No increased work of breathing, good air exchange  CARDIOVASCULAR: 2+ pulses throughout and capillary Refill less than 2 seconds  ABDOMEN: soft, non-distended, non-tender and no rebound tenderness or guarding  NEUROLOGIC: Awake, alert, oriented to name, place and time. Cranial nerves II-XII are grossly intact. Motor is 5 out of 5 bilaterally. Sensory is intact. Right Lower Extremity Exam:  Surgical incisions are well-healed, there is no swelling or erythema  There is mild pain with palpation over the fracture site of the distal femur  There is obvious gross deformity of the right distal thigh approximately 25 degrees varus angulation, when patient extends the knee there is crossover the contralateral leg at mid shin in a seated position  There is discomfort with attempted standing from seated position to the right thigh  Knee joint was without effusion, some global laxity about the previous TKA however does appear to have endpoints with varus and valgus stress, there is no tenderness to palpation along the tibia   Demonstrates active knee flexion/extension, active ROM 5-95, ankle plantar/dorsiflexion/great toe extension. States sensation intact to gross touch in sural/deep peroneal/superficial peroneal/saphenous/posterior tibial nerve distributions to foot/ankle.    Palpable dorsalis pedis and posterior tibialis pulses, cap refill brisk in toes, foot warm/perfused. Compartments supple throughout thigh and leg, calves supple/NT    Xrays Reviewed  Narrative   EXAMINATION:   4 XRAY VIEWS OF THE RIGHT FEMUR       8/17/2022 8:33 am       COMPARISON:   07/20/2022       HISTORY:   ORDERING SYSTEM PROVIDED HISTORY: Periprosthetic fracture around internal   prosthetic right knee joint, subsequent encounter       FINDINGS:   Proximal right femur is normal.  There is a long metallic plate and screws   transfixing a distal femoral metaphyseal fracture. There is complete   fracture of the lateral metallic plate in the region of the metaphysis. There is osseous bridging of the fracture with a large amount of callus along   the medial aspect of the distal femur. There is a knee joint arthroplasty. Lucency is seen at the bone cement interface of the tibial component   suggesting loosening. No evidence of loosening of the screws in the femoral   fixation plate. Cerclage wires remain in position. Impression   1. Fractured lateral metallic sideplate and 1 of the screws involving the   distal femur without change in alignment. There is abundant callus at the   fracture site medially and posteriorly causing partial osseous bridging of   the distal femoral fracture. 2.  Total knee joint arthroplasty. There is radiographic evidence to suggest   loosening of the tibial component. 24 degree varus malalignment and 23 degree flexion malalignment through the distal femur diaphysis with fractured [employer]ynthes distal femur LCP plate. ASSESSMENT:    ICD-10-CM    1. Periprosthetic fracture of shaft of femur  M97. 8XXA     [id number]           PLAN:   I again went through the images in detail with the patient today, we again discussed treatment options moving forward. Again discussed the severity of her deformity in both the coronal and sagittal planes.   Discussed revision surgery as one alternative for treatment versus continued observation. Risk and benefits of each outlined again in detail. All patient's questions addressed to her satisfaction. I did specifically discuss she would be toe-touch weightbearing only postoperatively for at least 2 months if not longer. Planning for the OR next week for right femur nonunion/malunion revision ORIF, discussed we would obtain intraoperative pathology as well as culture analysis to ensure no indolent infection. Discussed if infection were to be present would alter our treatment strategy which would involve some form of antibiotic coating or antibiotic beads. Patient to continue with her walker, protected weightbearing on the right lower extremity. I have explained the risks and complications of the recommended surgery with the patient at length, as well as discussed potential treatment alternatives including nonoperative management. These risks include but are not limited to death or complication from anesthesia, continued pain, nerve tendon or vascular injury, infection, nonunion or malunion, symptomatic hardware or hardware failure, deep vein thrombosis or pulmonary embolism, stiffness, wound healing complications, unforeseen complications, and need for further surgery, etc.  Patient understood this, asked appropriate questions, which were all answered, and she has elected to proceed with the procedure. No guarantees were provided. Patient understands despite revision surgery there is no guarantee that she will go on to appropriate union. Discussed her blood glucose and strict monitoring and management. Electronically Signed By  Malia Lawler DO  8/25/22    NOTE: This report was transcribed using voice recognition software.  Every effort was made to ensure accuracy; however, inadvertent computerized transcription errors may be present

## 2022-08-25 NOTE — PROGRESS NOTES
Check blood sugar every morning 150 each 3    ketoconazole (NIZORAL) 2 % cream Apply topically daily. 60 g 5    metroNIDAZOLE, TOPICAL, 0.75 % LOTN Apply to face BID 3 Bottle 3    triamcinolone (KENALOG) 0.1 % cream Apply bid to affected areas prn 1200 g 3    vitamin D (ERGOCALCIFEROL) 09619 units CAPS capsule Take 1 capsule by mouth once a week 12 capsule 1    Handicap Placard MISC by Does not apply route Unable to ambulate more than 60 feet without difficulty  Expires 10/31/2022 1 each 0     No current facility-administered medications for this visit. 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    Overview:  [facility] [address] Rx, 2001    Chronic nonalcoholic liver disease 63/94/0640    Diabetes ([facility] [address] 2019    Fracture of right lower extremity     Hyperlipidemia     Hypertension     Hypothyroidism     Malignant neoplasm of ovary ([facility] [address] 2001    Obesity     Positive FIT (fecal immunochemical test) 04/26/2018    Type II or unspecified type diabetes mellitus without mention of complication, not stated as uncontrolled     Unspecified vitamin D deficiency 01/14/2016     Past Surgical History:   Procedure Laterality Date    COLONOSCOPY      HYSTERECTOMY (CERVIX STATUS UNKNOWN)      JOINT REPLACEMENT      total knees bilateral    KNEE SURGERY Bilateral 2006/ 2009    [place] COLONOSCOPY FLX DX W/COLLJ SPEC WHEN PFRMD N/A 5/30/2018    COLONOSCOPY performed by Estefani Ward MD at [address] [place] [address] ENDOSCOPY     Family History   Problem Relation Age of Onset    Early Death Mother      Social History     Tobacco Use    Smoking status: Never    Smokeless tobacco: Never   Substance Use Topics    Alcohol use: Yes     Comment: social                           Review of Systems   Constitutional: Negative for fever and chills. HENT: Negative for ear pain, sore throat and sinus pressure. Eyes: Negative for pain, discharge and redness.    Respiratory: Negative for cough, shortness of breath and wheezing. Cardiovascular: Negative for chest pain. Gastrointestinal: Negative for nausea, vomiting, diarrhea and abdominal distention. Genitourinary: Negative for dysuria and frequency. Musculoskeletal: Negative for back pain and arthralgias. All other systems reviewed and negative. CC: Second opinion right femur fracture    S: Roney Simpson is a 79 y.o. who is here today for second opinion for her right leg. Patient has history of TKA in the past which she states she had in [facility] [employer]. States this required 2 revisions for loosening. She is really unclear exactly what caused the loosening. When asked about infection she denies any obvious infection about the knee but does states she did have a history of pneumonia and MRSA. Unsure if these were related to her knee whatsoever. Was recently down in [place] sustained mechanical fall and had surgery on 3/14/2022 for right distal femur periprosthetic fracture. She lateral plate and screw construct with cerclage cables. She was initially following up in [place] with Dr. Michael Carias, fracture seem to be healing however there is interval loss of reduction in failure now patient has obvious clinical deformity to the limb and she is here today for further discussion on management. States she uses a walker to ambulate. States she has very mild pain but does have pain present to the fracture site. Feels that she is becoming more deconditioned due to her limited ability to ambulate. Does state that she was full weightbearing immediately after her initial surgery. States she felt that she was doing well and going out to the stores however couple weeks ago she sat down and felt a pop to the knee, did not notice any significant increase in her pain but did notice a decrease in the function to the limb. Patient is a type 2 diabetic. During last office visit we discussed surgical intervention, patient wanted some time to think about this.   She presents today pulses, cap refill brisk in toes, foot warm/perfused. Compartments supple throughout thigh and leg, calves supple/NT    Xrays Reviewed  Narrative   EXAMINATION:   4 XRAY VIEWS OF THE RIGHT FEMUR       8/17/2022 8:33 am       COMPARISON:   07/20/2022       HISTORY:   ORDERING SYSTEM PROVIDED HISTORY: Periprosthetic fracture around internal   prosthetic right knee joint, subsequent encounter       FINDINGS:   Proximal right femur is normal.  There is a long metallic plate and screws   transfixing a distal femoral metaphyseal fracture. There is complete   fracture of the lateral metallic plate in the region of the metaphysis. There is osseous bridging of the fracture with a large amount of callus along   the medial aspect of the distal femur. There is a knee joint arthroplasty. Lucency is seen at the bone cement interface of the tibial component   suggesting loosening. No evidence of loosening of the screws in the femoral   fixation plate. Cerclage wires remain in position. Impression   1. Fractured lateral metallic sideplate and 1 of the screws involving the   distal femur without change in alignment. There is abundant callus at the   fracture site medially and posteriorly causing partial osseous bridging of   the distal femoral fracture. 2.  Total knee joint arthroplasty. There is radiographic evidence to suggest   loosening of the tibial component. 24 degree varus malalignment and 23 degree flexion malalignment through the distal femur diaphysis with fractured [employer]ynthes distal femur LCP plate. ASSESSMENT:    ICD-10-CM    1. Periprosthetic fracture of shaft of femur  M97. 8XXA     [id number]           PLAN:   I again went through the images in detail with the patient today, we again discussed treatment options moving forward. Again discussed the severity of her deformity in both the coronal and sagittal planes.   Discussed revision surgery as one alternative for treatment versus continued observation. Risk and benefits of each outlined again in detail. All patient's questions addressed to her satisfaction. I did specifically discuss she would be toe-touch weightbearing only postoperatively for at least 2 months if not longer. Planning for the OR next week for right femur nonunion/malunion revision ORIF, discussed we would obtain intraoperative pathology as well as culture analysis to ensure no indolent infection. Discussed if infection were to be present would alter our treatment strategy which would involve some form of antibiotic coating or antibiotic beads. Patient to continue with her walker, protected weightbearing on the right lower extremity. I have explained the risks and complications of the recommended surgery with the patient at length, as well as discussed potential treatment alternatives including nonoperative management. These risks include but are not limited to death or complication from anesthesia, continued pain, nerve tendon or vascular injury, infection, nonunion or malunion, symptomatic hardware or hardware failure, deep vein thrombosis or pulmonary embolism, stiffness, wound healing complications, unforeseen complications, and need for further surgery, etc.  Patient understood this, asked appropriate questions, which were all answered, and she has elected to proceed with the procedure. No guarantees were provided. Patient understands despite revision surgery there is no guarantee that she will go on to appropriate union. Discussed her blood glucose and strict monitoring and management. Electronically Signed By  Zahraa Aaron DO  8/25/22    NOTE: This report was transcribed using voice recognition software.  Every effort was made to ensure accuracy; however, inadvertent computerized transcription errors may be present

## 2022-08-26 ENCOUNTER — PATIENT MESSAGE (OUTPATIENT)
Dept: FAMILY MEDICINE CLINIC | Age: 67
End: 2022-08-26

## 2022-08-26 ENCOUNTER — TELEPHONE (OUTPATIENT)
Dept: FAMILY MEDICINE CLINIC | Age: 67
End: 2022-08-26

## 2022-08-26 DIAGNOSIS — R26.81 GAIT INSTABILITY: ICD-10-CM

## 2022-08-26 DIAGNOSIS — M25.512 ACUTE PAIN OF LEFT SHOULDER: Primary | ICD-10-CM

## 2022-08-26 DIAGNOSIS — S72.8X1D OTHER CLOSED FRACTURE OF RIGHT FEMUR WITH ROUTINE HEALING, UNSPECIFIED PORTION OF FEMUR, SUBSEQUENT ENCOUNTER: Primary | ICD-10-CM

## 2022-08-26 RX ORDER — TRAMADOL HYDROCHLORIDE 50 MG/1
50 TABLET ORAL EVERY 6 HOURS PRN
COMMUNITY

## 2022-08-26 RX ORDER — HYDROCODONE BITARTRATE AND ACETAMINOPHEN 5; 325 MG/1; MG/1
1 TABLET ORAL EVERY 8 HOURS PRN
Qty: 21 TABLET | Refills: 0 | Status: SHIPPED | OUTPATIENT
Start: 2022-08-26 | End: 2022-09-02

## 2022-08-26 NOTE — TELEPHONE ENCOUNTER
Patient called crying in pain after hours. States her left shoulder pain and left upper arm pain has worsened. Took leftover tramadol from previous surgery but has not helped. Patient wants an MRI but has upcoming femur surgery next week. I explained to patient it is best to wait on pursuing an MRI of her shoulder until after postop. This also can be better handled by orthopedics. Inform patient I will send a 1 week supply of a stronger pain medication since we cannot get the MRI or further shoulder evaluation done prior to her upcoming orthopedic surgery. Patient in agreement. Referral to Dr. Marval Siemens for left shoulder pain ordered.

## 2022-08-26 NOTE — TELEPHONE ENCOUNTER
From: Lyly Mckeon  To: Dr. Marilynn So  Sent: 8/23/2022 6:26 PM EDT  Subject: Blood work    Hi, can you please look at the blood work I had done at [facility] for my pre surgery testing. My Alkaline Phosphate number was [id number]. Is this something I should be concerned about?

## 2022-08-26 NOTE — PROGRESS NOTES
[address]   PRE-ADMISSION TESTING GENERAL INSTRUCTIONS  [facility] Phone Number: [phone]      GENERAL INSTRUCTIONS:    [x] Antibacterial Soap Shower Night before and/or AM of surgery. [] CHG Wipes instruction sheet and wipes given. [] Hibiclens shower the night before and the morning of surgery.   -Do not use Hibiclens on your face or head. [x] Do not wear makeup, lotions, powders, deodorant. [x] Nothing by mouth after midnight; including gum, candy, mints, or water. [x] You may brush your teeth, gargle, but do NOT swallow water. [] No tobacco products, illegal drugs, or alcohol within 24 hours of your surgery. [x] Jewelry or valuables should not be brought to the hospital. All body and/or tongue piercing's must be removed prior to arriving to hospital. No contact lens or hair pins. [x] Arrange transportation with a responsible adult  to and from the hospital. Arrange for someone to be with you for the remainder of the day and for 24 hours after your procedure due to having had anesthesia. -Who will be your  for transportation?________Joe__________         -Who will be staying with you for 24 hrs after your procedure?_____Joe_____________  [x] Bring insurance card and photo ID.  [] Bring copy of living will or healthcare power of  papers to be placed in your electronic record. [] Urine Pregnancy test will be preformed the day of surgery. A specimen sample may be brought from home. [] Transfusion Bracelet: Please bring with you to hospital, day of surgery. PARKING INSTRUCTIONS:     [x] ARRIVAL DATE & TIME: 8/31/22 0500  [x] Enter into the [facility] [employer]. Two people may accompany you. Masks are required. [] Parking Lot \"I\" is where you will park. It is located on the corner of [address] and [address]. The entrance is on [address].    Upon entering the parking lot, a voucher ticket will print    EDUCATION INSTRUCTIONS: [] Knee or Hip replacement booklet & exercise pamphlets given. [] [address] placed in chart. [] Pre-admission Testing educational folder given  [] Incentive Spirometry,coughing & deep breathing exercises reviewed. [] Medication information sheet(s)   [] Fluoroscopy-Xray used in surgery reviewed with patient. Educational pamphlet placed in chart. [] Pain: Post-op pain is normal and to be expected. You will be asked to rate your pain from 0-10. [] Joint camp offered. [] Joint replacement booklets given.  [] Spine Navigator to see in PAT. [] Other:___________________________    MEDICATION INSTRUCTIONS:    [x] Bring a complete list of your medications, please write the last time you took the medicine, give this list to the nurse in Pre-Op. [] Take only the following medications the morning of surgery with 1-2 ounces of water: See med list  [x] Stop all herbal supplements and vitamins 5 days before surgery. Stop NSAIDS 7 days before surgery. [x] DO NOT take any diabetic medicine the morning of surgery. Follow instructions for insulin the day before surgery. [] If you are diabetic and your blood sugar is low or you feel symptomatic, you may drink 1-2 ounces of apple juice or take a glucose tablet.            -The morning of your procedure, you may call the pre-op area if you have concerns about your blood sugar [phone]. [] Use your inhalers the morning of surgery. Bring your emergency inhaler with you day of surgery. [] Follow physician instructions regarding any blood thinners you may be taking. WHAT TO EXPECT:    [x] The day of surgery you will be greeted and checked in by the [employer].  In addition, you will be registered in the [place] by a Patient Access Representative. Please bring your photo ID and insurance card. A nurse will greet you in accordance to the time you are needed in the pre-op area to prepare you for surgery. Please do not be discouraged if you are not greeted in the order you arrive as there are many variables that are involved in patient preparation. Your patience is greatly appreciated as you wait for your nurse. Please bring in items such as: books, magazines, newspapers, electronics, or any other items  to occupy your time in the waiting area. []  Delays may occur with surgery and staff will make a sincere effort to keep you informed of delays. If any delays occur with your procedure, we apologize ahead of time for your inconvenience as we recognize the value of your time.

## 2022-08-26 NOTE — TELEPHONE ENCOUNTER
From: David Seller  To: Dr. Keshav Reese  Sent: 8/26/2022 2:16 PM EDT  Subject: Stair lift    Hi, we are getting a small stair lift from our family room to the kitchen. The salesman said if you write a prescription for it, we will not have to pay sales tax. If you could fax it to [phone] I would really appreciate it. The oxicodone has helped a little, but I still have a lot of pain.

## 2022-08-30 ENCOUNTER — ANESTHESIA EVENT (OUTPATIENT)
Dept: OPERATING ROOM | Age: 67
DRG: 481 | End: 2022-08-30
Payer: MEDICARE

## 2022-08-31 ENCOUNTER — APPOINTMENT (OUTPATIENT)
Dept: GENERAL RADIOLOGY | Age: 67
DRG: 481 | End: 2022-08-31
Attending: ORTHOPAEDIC SURGERY
Payer: MEDICARE

## 2022-08-31 ENCOUNTER — HOSPITAL ENCOUNTER (INPATIENT)
Age: 67
LOS: 1 days | Discharge: HOME HEALTH | DRG: 481 | End: 2022-09-01
Attending: ORTHOPAEDIC SURGERY | Admitting: ORTHOPAEDIC SURGERY
Payer: MEDICARE

## 2022-08-31 ENCOUNTER — ANESTHESIA (OUTPATIENT)
Dept: OPERATING ROOM | Age: 67
DRG: 481 | End: 2022-08-31
Payer: MEDICARE

## 2022-08-31 DIAGNOSIS — S52.352J: ICD-10-CM

## 2022-08-31 DIAGNOSIS — Z01.818 PRE-OP TESTING: Primary | ICD-10-CM

## 2022-08-31 DIAGNOSIS — M97.11XD PERIPROSTHETIC FRACTURE AROUND INTERNAL PROSTHETIC RIGHT KNEE JOINT, SUBSEQUENT ENCOUNTER: ICD-10-CM

## 2022-08-31 PROBLEM — S72.351K CLOSED DISP COMMINUTED FRACTURE OF SHAFT OF RIGHT FEMUR WITH NONUNION: Status: ACTIVE | Noted: 2022-08-31

## 2022-08-31 LAB
ABO/RH: NORMAL
ANTIBODY SCREEN: NORMAL
METER GLUCOSE: 122 MG/DL (ref 74–99)
METER GLUCOSE: 174 MG/DL (ref 74–99)
METER GLUCOSE: 177 MG/DL (ref 74–99)
METER GLUCOSE: 182 MG/DL (ref 74–99)

## 2022-08-31 PROCEDURE — 3E0T3BZ INTRODUCTION OF ANESTHETIC AGENT INTO PERIPHERAL NERVES AND PLEXI, PERCUTANEOUS APPROACH: ICD-10-PCS | Performed by: ANESTHESIOLOGY

## 2022-08-31 PROCEDURE — 6360000002 HC RX W HCPCS: Performed by: NURSE ANESTHETIST, CERTIFIED REGISTERED

## 2022-08-31 PROCEDURE — 6360000002 HC RX W HCPCS: Performed by: STUDENT IN AN ORGANIZED HEALTH CARE EDUCATION/TRAINING PROGRAM

## 2022-08-31 PROCEDURE — 6360000002 HC RX W HCPCS

## 2022-08-31 PROCEDURE — 97161 PT EVAL LOW COMPLEX 20 MIN: CPT

## 2022-08-31 PROCEDURE — 2580000003 HC RX 258: Performed by: NURSE ANESTHETIST, CERTIFIED REGISTERED

## 2022-08-31 PROCEDURE — 87070 CULTURE OTHR SPECIMN AEROBIC: CPT

## 2022-08-31 PROCEDURE — 7100000001 HC PACU RECOVERY - ADDTL 15 MIN: Performed by: ORTHOPAEDIC SURGERY

## 2022-08-31 PROCEDURE — C1713 ANCHOR/SCREW BN/BN,TIS/BN: HCPCS | Performed by: ORTHOPAEDIC SURGERY

## 2022-08-31 PROCEDURE — 97530 THERAPEUTIC ACTIVITIES: CPT

## 2022-08-31 PROCEDURE — 6360000002 HC RX W HCPCS: Performed by: ORTHOPAEDIC SURGERY

## 2022-08-31 PROCEDURE — 3600000005 HC SURGERY LEVEL 5 BASE: Performed by: ORTHOPAEDIC SURGERY

## 2022-08-31 PROCEDURE — 27470 REPAIR OF THIGH: CPT | Performed by: ORTHOPAEDIC SURGERY

## 2022-08-31 PROCEDURE — 2580000003 HC RX 258: Performed by: STUDENT IN AN ORGANIZED HEALTH CARE EDUCATION/TRAINING PROGRAM

## 2022-08-31 PROCEDURE — 2580000003 HC RX 258: Performed by: PHYSICIAN ASSISTANT

## 2022-08-31 PROCEDURE — 6360000002 HC RX W HCPCS: Performed by: PHYSICIAN ASSISTANT

## 2022-08-31 PROCEDURE — 6370000000 HC RX 637 (ALT 250 FOR IP): Performed by: STUDENT IN AN ORGANIZED HEALTH CARE EDUCATION/TRAINING PROGRAM

## 2022-08-31 PROCEDURE — 86850 RBC ANTIBODY SCREEN: CPT

## 2022-08-31 PROCEDURE — 6360000002 HC RX W HCPCS: Performed by: ANESTHESIOLOGY

## 2022-08-31 PROCEDURE — 2500000003 HC RX 250 WO HCPCS

## 2022-08-31 PROCEDURE — 88305 TISSUE EXAM BY PATHOLOGIST: CPT

## 2022-08-31 PROCEDURE — 20610 DRAIN/INJ JOINT/BURSA W/O US: CPT | Performed by: ORTHOPAEDIC SURGERY

## 2022-08-31 PROCEDURE — 2709999900 HC NON-CHARGEABLE SUPPLY: Performed by: ORTHOPAEDIC SURGERY

## 2022-08-31 PROCEDURE — 73552 X-RAY EXAM OF FEMUR 2/>: CPT

## 2022-08-31 PROCEDURE — 2500000003 HC RX 250 WO HCPCS: Performed by: NURSE ANESTHETIST, CERTIFIED REGISTERED

## 2022-08-31 PROCEDURE — 7100000000 HC PACU RECOVERY - FIRST 15 MIN: Performed by: ORTHOPAEDIC SURGERY

## 2022-08-31 PROCEDURE — 64447 NJX AA&/STRD FEMORAL NRV IMG: CPT | Performed by: ANESTHESIOLOGY

## 2022-08-31 PROCEDURE — 86901 BLOOD TYPING SEROLOGIC RH(D): CPT

## 2022-08-31 PROCEDURE — 3600000015 HC SURGERY LEVEL 5 ADDTL 15MIN: Performed by: ORTHOPAEDIC SURGERY

## 2022-08-31 PROCEDURE — 0QPB04Z REMOVAL OF INTERNAL FIXATION DEVICE FROM RIGHT LOWER FEMUR, OPEN APPROACH: ICD-10-PCS | Performed by: ORTHOPAEDIC SURGERY

## 2022-08-31 PROCEDURE — 2500000003 HC RX 250 WO HCPCS: Performed by: ORTHOPAEDIC SURGERY

## 2022-08-31 PROCEDURE — 3700000000 HC ANESTHESIA ATTENDED CARE: Performed by: ORTHOPAEDIC SURGERY

## 2022-08-31 PROCEDURE — 0QS804Z REPOSITION RIGHT FEMORAL SHAFT WITH INTERNAL FIXATION DEVICE, OPEN APPROACH: ICD-10-PCS | Performed by: ORTHOPAEDIC SURGERY

## 2022-08-31 PROCEDURE — 88331 PATH CONSLTJ SURG 1 BLK 1SPC: CPT

## 2022-08-31 PROCEDURE — 3700000001 HC ADD 15 MINUTES (ANESTHESIA): Performed by: ORTHOPAEDIC SURGERY

## 2022-08-31 PROCEDURE — 2580000003 HC RX 258

## 2022-08-31 PROCEDURE — 27507 TREATMENT OF THIGH FRACTURE: CPT | Performed by: ORTHOPAEDIC SURGERY

## 2022-08-31 PROCEDURE — 82962 GLUCOSE BLOOD TEST: CPT

## 2022-08-31 PROCEDURE — 87075 CULTR BACTERIA EXCEPT BLOOD: CPT

## 2022-08-31 PROCEDURE — 97165 OT EVAL LOW COMPLEX 30 MIN: CPT

## 2022-08-31 PROCEDURE — 86900 BLOOD TYPING SEROLOGIC ABO: CPT

## 2022-08-31 PROCEDURE — 36415 COLL VENOUS BLD VENIPUNCTURE: CPT

## 2022-08-31 PROCEDURE — 1200000000 HC SEMI PRIVATE

## 2022-08-31 PROCEDURE — 3209999900 FLUORO FOR SURGICAL PROCEDURES

## 2022-08-31 DEVICE — SCREW BNE L52MM DIA4.5MM PROX CORT TIB S STL ST LOK FULL: Type: IMPLANTABLE DEVICE | Site: FEMUR | Status: FUNCTIONAL

## 2022-08-31 DEVICE — SCREW BNE L32MM DIA5MM CNDYL S STL ST VAR ANG LOK T25: Type: IMPLANTABLE DEVICE | Site: FEMUR | Status: FUNCTIONAL

## 2022-08-31 DEVICE — SCREW BNE L56MM DIA4.5MM PROX CORT TIB S STL ST LOK FULL: Type: IMPLANTABLE DEVICE | Site: FEMUR | Status: FUNCTIONAL

## 2022-08-31 DEVICE — PLATE BNE L301MM 14 H R CNDYL S STL CRV VAR ANG LOK COMPR: Type: IMPLANTABLE DEVICE | Site: FEMUR | Status: FUNCTIONAL

## 2022-08-31 DEVICE — SCREW BNE L54MM DIA4.5MM PROX CORT TIB S STL ST LOK FULL: Type: IMPLANTABLE DEVICE | Site: FEMUR | Status: FUNCTIONAL

## 2022-08-31 DEVICE — 1CC SYRINGE OF BG PUTTY BIOACTIVE BONE GRAFT SUBSTITUTE
Type: IMPLANTABLE DEVICE | Site: FEMUR | Status: FUNCTIONAL
Brand: FIBERGRAFT BG PUTTY

## 2022-08-31 DEVICE — SCREW BNE L36MM DIA4.5MM PROX CORT TIB S STL ST LOK FULL: Type: IMPLANTABLE DEVICE | Site: FEMUR | Status: FUNCTIONAL

## 2022-08-31 DEVICE — SCREW BNE L28MM DIA5MM CNDYL S STL ST VAR ANG LOK FULL THRD: Type: IMPLANTABLE DEVICE | Site: FEMUR | Status: FUNCTIONAL

## 2022-08-31 DEVICE — SCREW BNE L75MM DIA5MM CNDYL S STL ST VAR ANG LOK T25: Type: IMPLANTABLE DEVICE | Site: FEMUR | Status: FUNCTIONAL

## 2022-08-31 DEVICE — SCREW BNE L58MM DIA4.5MM PROX CORT TIB S STL ST LOK FULL: Type: IMPLANTABLE DEVICE | Site: FEMUR | Status: FUNCTIONAL

## 2022-08-31 DEVICE — SCREW BNE L42MM DIA5MM CNDYL S STL ST VAR ANG LOK T25: Type: IMPLANTABLE DEVICE | Site: FEMUR | Status: FUNCTIONAL

## 2022-08-31 DEVICE — SCREW BNE L38MM DIA4.5MM PROX CORT TIB S STL ST LOK FULL: Type: IMPLANTABLE DEVICE | Site: FEMUR | Status: FUNCTIONAL

## 2022-08-31 RX ORDER — MORPHINE SULFATE 4 MG/ML
4 INJECTION, SOLUTION INTRAMUSCULAR; INTRAVENOUS
Status: DISCONTINUED | OUTPATIENT
Start: 2022-08-31 | End: 2022-09-01 | Stop reason: HOSPADM

## 2022-08-31 RX ORDER — LOSARTAN POTASSIUM 50 MG/1
100 TABLET ORAL DAILY
Status: DISCONTINUED | OUTPATIENT
Start: 2022-08-31 | End: 2022-09-01 | Stop reason: HOSPADM

## 2022-08-31 RX ORDER — ACETAMINOPHEN 325 MG/1
650 TABLET ORAL EVERY 6 HOURS
Status: DISCONTINUED | OUTPATIENT
Start: 2022-08-31 | End: 2022-09-01 | Stop reason: HOSPADM

## 2022-08-31 RX ORDER — DIPHENHYDRAMINE HYDROCHLORIDE 50 MG/ML
12.5 INJECTION INTRAMUSCULAR; INTRAVENOUS
Status: DISCONTINUED | OUTPATIENT
Start: 2022-08-31 | End: 2022-08-31 | Stop reason: HOSPADM

## 2022-08-31 RX ORDER — SODIUM CHLORIDE 0.9 % (FLUSH) 0.9 %
5-40 SYRINGE (ML) INJECTION EVERY 12 HOURS SCHEDULED
Status: DISCONTINUED | OUTPATIENT
Start: 2022-08-31 | End: 2022-09-01 | Stop reason: HOSPADM

## 2022-08-31 RX ORDER — SODIUM CHLORIDE 9 MG/ML
INJECTION, SOLUTION INTRAVENOUS PRN
Status: DISCONTINUED | OUTPATIENT
Start: 2022-08-31 | End: 2022-09-01 | Stop reason: HOSPADM

## 2022-08-31 RX ORDER — MIDAZOLAM HYDROCHLORIDE 2 MG/2ML
2 INJECTION, SOLUTION INTRAMUSCULAR; INTRAVENOUS ONCE
Status: COMPLETED | OUTPATIENT
Start: 2022-08-31 | End: 2022-08-31

## 2022-08-31 RX ORDER — TRAMADOL HYDROCHLORIDE 50 MG/1
50 TABLET ORAL
Status: DISCONTINUED | OUTPATIENT
Start: 2022-08-31 | End: 2022-08-31 | Stop reason: HOSPADM

## 2022-08-31 RX ORDER — ROPIVACAINE HYDROCHLORIDE 5 MG/ML
30 INJECTION, SOLUTION EPIDURAL; INFILTRATION; PERINEURAL ONCE
Status: COMPLETED | OUTPATIENT
Start: 2022-08-31 | End: 2022-08-31

## 2022-08-31 RX ORDER — ASPIRIN 81 MG/1
81 TABLET ORAL 2 TIMES DAILY
Status: DISCONTINUED | OUTPATIENT
Start: 2022-08-31 | End: 2022-09-01 | Stop reason: HOSPADM

## 2022-08-31 RX ORDER — LABETALOL HYDROCHLORIDE 5 MG/ML
5 INJECTION, SOLUTION INTRAVENOUS
Status: DISCONTINUED | OUTPATIENT
Start: 2022-08-31 | End: 2022-08-31 | Stop reason: HOSPADM

## 2022-08-31 RX ORDER — ASPIRIN 81 MG/1
81 TABLET ORAL 2 TIMES DAILY
Qty: 56 TABLET | Refills: 0 | Status: SHIPPED | OUTPATIENT
Start: 2022-08-31 | End: 2022-09-02

## 2022-08-31 RX ORDER — LIDOCAINE HYDROCHLORIDE 20 MG/ML
INJECTION, SOLUTION INTRAVENOUS PRN
Status: DISCONTINUED | OUTPATIENT
Start: 2022-08-31 | End: 2022-08-31 | Stop reason: SDUPTHER

## 2022-08-31 RX ORDER — CALCIUM CARBONATE 200(500)MG
500 TABLET,CHEWABLE ORAL DAILY
Status: DISCONTINUED | OUTPATIENT
Start: 2022-08-31 | End: 2022-09-01 | Stop reason: HOSPADM

## 2022-08-31 RX ORDER — VANCOMYCIN HYDROCHLORIDE 500 MG/10ML
INJECTION, POWDER, LYOPHILIZED, FOR SOLUTION INTRAVENOUS PRN
Status: DISCONTINUED | OUTPATIENT
Start: 2022-08-31 | End: 2022-08-31 | Stop reason: ALTCHOICE

## 2022-08-31 RX ORDER — SODIUM CHLORIDE 0.9 % (FLUSH) 0.9 %
5-40 SYRINGE (ML) INJECTION PRN
Status: DISCONTINUED | OUTPATIENT
Start: 2022-08-31 | End: 2022-08-31 | Stop reason: HOSPADM

## 2022-08-31 RX ORDER — ONDANSETRON 2 MG/ML
4 INJECTION INTRAMUSCULAR; INTRAVENOUS EVERY 6 HOURS PRN
Status: DISCONTINUED | OUTPATIENT
Start: 2022-08-31 | End: 2022-09-01 | Stop reason: HOSPADM

## 2022-08-31 RX ORDER — PROPOFOL 10 MG/ML
INJECTION, EMULSION INTRAVENOUS PRN
Status: DISCONTINUED | OUTPATIENT
Start: 2022-08-31 | End: 2022-08-31 | Stop reason: SDUPTHER

## 2022-08-31 RX ORDER — MORPHINE SULFATE 2 MG/ML
2 INJECTION, SOLUTION INTRAMUSCULAR; INTRAVENOUS
Status: DISCONTINUED | OUTPATIENT
Start: 2022-08-31 | End: 2022-09-01 | Stop reason: HOSPADM

## 2022-08-31 RX ORDER — MIDAZOLAM HYDROCHLORIDE 2 MG/2ML
2 INJECTION, SOLUTION INTRAMUSCULAR; INTRAVENOUS
Status: DISCONTINUED | OUTPATIENT
Start: 2022-08-31 | End: 2022-08-31 | Stop reason: HOSPADM

## 2022-08-31 RX ORDER — FENTANYL CITRATE 50 UG/ML
INJECTION, SOLUTION INTRAMUSCULAR; INTRAVENOUS PRN
Status: DISCONTINUED | OUTPATIENT
Start: 2022-08-31 | End: 2022-08-31 | Stop reason: SDUPTHER

## 2022-08-31 RX ORDER — IPRATROPIUM BROMIDE AND ALBUTEROL SULFATE 2.5; .5 MG/3ML; MG/3ML
1 SOLUTION RESPIRATORY (INHALATION)
Status: DISCONTINUED | OUTPATIENT
Start: 2022-08-31 | End: 2022-08-31 | Stop reason: HOSPADM

## 2022-08-31 RX ORDER — SODIUM CHLORIDE 9 MG/ML
INJECTION, SOLUTION INTRAVENOUS CONTINUOUS PRN
Status: DISCONTINUED | OUTPATIENT
Start: 2022-08-31 | End: 2022-08-31 | Stop reason: SDUPTHER

## 2022-08-31 RX ORDER — HYDROCHLOROTHIAZIDE 25 MG/1
25 TABLET ORAL DAILY
Status: DISCONTINUED | OUTPATIENT
Start: 2022-08-31 | End: 2022-09-01 | Stop reason: HOSPADM

## 2022-08-31 RX ORDER — SODIUM CHLORIDE 0.9 % (FLUSH) 0.9 %
5-40 SYRINGE (ML) INJECTION EVERY 12 HOURS SCHEDULED
Status: DISCONTINUED | OUTPATIENT
Start: 2022-08-31 | End: 2022-08-31 | Stop reason: HOSPADM

## 2022-08-31 RX ORDER — SODIUM CHLORIDE, SODIUM LACTATE, POTASSIUM CHLORIDE, CALCIUM CHLORIDE 600; 310; 30; 20 MG/100ML; MG/100ML; MG/100ML; MG/100ML
INJECTION, SOLUTION INTRAVENOUS CONTINUOUS PRN
Status: DISCONTINUED | OUTPATIENT
Start: 2022-08-31 | End: 2022-08-31 | Stop reason: SDUPTHER

## 2022-08-31 RX ORDER — SODIUM PHOSPHATE, DIBASIC AND SODIUM PHOSPHATE, MONOBASIC 7; 19 G/133ML; G/133ML
1 ENEMA RECTAL DAILY PRN
Status: DISCONTINUED | OUTPATIENT
Start: 2022-08-31 | End: 2022-09-01 | Stop reason: HOSPADM

## 2022-08-31 RX ORDER — HYDRALAZINE HYDROCHLORIDE 20 MG/ML
5 INJECTION INTRAMUSCULAR; INTRAVENOUS
Status: DISCONTINUED | OUTPATIENT
Start: 2022-08-31 | End: 2022-08-31 | Stop reason: HOSPADM

## 2022-08-31 RX ORDER — SODIUM CHLORIDE, SODIUM LACTATE, POTASSIUM CHLORIDE, CALCIUM CHLORIDE 600; 310; 30; 20 MG/100ML; MG/100ML; MG/100ML; MG/100ML
INJECTION, SOLUTION INTRAVENOUS CONTINUOUS
Status: DISCONTINUED | OUTPATIENT
Start: 2022-08-31 | End: 2022-08-31 | Stop reason: HOSPADM

## 2022-08-31 RX ORDER — ROPIVACAINE HYDROCHLORIDE 5 MG/ML
INJECTION, SOLUTION EPIDURAL; INFILTRATION; PERINEURAL
Status: DISCONTINUED | OUTPATIENT
Start: 2022-08-31 | End: 2022-08-31 | Stop reason: SDUPTHER

## 2022-08-31 RX ORDER — SODIUM CHLORIDE 9 MG/ML
INJECTION, SOLUTION INTRAVENOUS PRN
Status: DISCONTINUED | OUTPATIENT
Start: 2022-08-31 | End: 2022-08-31 | Stop reason: HOSPADM

## 2022-08-31 RX ORDER — OXYCODONE HYDROCHLORIDE 10 MG/1
10 TABLET ORAL EVERY 4 HOURS PRN
Status: DISCONTINUED | OUTPATIENT
Start: 2022-08-31 | End: 2022-09-01 | Stop reason: HOSPADM

## 2022-08-31 RX ORDER — DEXAMETHASONE SODIUM PHOSPHATE 10 MG/ML
INJECTION INTRAMUSCULAR; INTRAVENOUS PRN
Status: DISCONTINUED | OUTPATIENT
Start: 2022-08-31 | End: 2022-08-31 | Stop reason: SDUPTHER

## 2022-08-31 RX ORDER — DROPERIDOL 2.5 MG/ML
0.62 INJECTION, SOLUTION INTRAMUSCULAR; INTRAVENOUS
Status: DISCONTINUED | OUTPATIENT
Start: 2022-08-31 | End: 2022-08-31 | Stop reason: HOSPADM

## 2022-08-31 RX ORDER — OMEGA-3S/DHA/EPA/FISH OIL/D3 300MG-1000
800 CAPSULE ORAL DAILY
Status: DISCONTINUED | OUTPATIENT
Start: 2022-08-31 | End: 2022-09-01 | Stop reason: HOSPADM

## 2022-08-31 RX ORDER — DEXTROSE MONOHYDRATE 100 MG/ML
INJECTION, SOLUTION INTRAVENOUS CONTINUOUS PRN
Status: DISCONTINUED | OUTPATIENT
Start: 2022-08-31 | End: 2022-09-01 | Stop reason: HOSPADM

## 2022-08-31 RX ORDER — ACETAMINOPHEN 325 MG/1
650 TABLET ORAL
Status: DISCONTINUED | OUTPATIENT
Start: 2022-08-31 | End: 2022-08-31 | Stop reason: HOSPADM

## 2022-08-31 RX ORDER — ONDANSETRON 4 MG/1
4 TABLET, ORALLY DISINTEGRATING ORAL EVERY 8 HOURS PRN
Status: DISCONTINUED | OUTPATIENT
Start: 2022-08-31 | End: 2022-09-01 | Stop reason: HOSPADM

## 2022-08-31 RX ORDER — BISACODYL 10 MG
10 SUPPOSITORY, RECTAL RECTAL DAILY
Status: DISCONTINUED | OUTPATIENT
Start: 2022-08-31 | End: 2022-09-01 | Stop reason: HOSPADM

## 2022-08-31 RX ORDER — SODIUM CHLORIDE 0.9 % (FLUSH) 0.9 %
5-40 SYRINGE (ML) INJECTION PRN
Status: DISCONTINUED | OUTPATIENT
Start: 2022-08-31 | End: 2022-09-01 | Stop reason: HOSPADM

## 2022-08-31 RX ORDER — POLYETHYLENE GLYCOL 3350 17 G/17G
17 POWDER, FOR SOLUTION ORAL DAILY
Status: DISCONTINUED | OUTPATIENT
Start: 2022-08-31 | End: 2022-09-01 | Stop reason: HOSPADM

## 2022-08-31 RX ORDER — ONDANSETRON 2 MG/ML
4 INJECTION INTRAMUSCULAR; INTRAVENOUS
Status: DISCONTINUED | OUTPATIENT
Start: 2022-08-31 | End: 2022-08-31 | Stop reason: HOSPADM

## 2022-08-31 RX ORDER — FENTANYL CITRATE 50 UG/ML
100 INJECTION, SOLUTION INTRAMUSCULAR; INTRAVENOUS ONCE
Status: COMPLETED | OUTPATIENT
Start: 2022-08-31 | End: 2022-08-31

## 2022-08-31 RX ORDER — INSULIN LISPRO 100 [IU]/ML
0-4 INJECTION, SOLUTION INTRAVENOUS; SUBCUTANEOUS
Status: DISCONTINUED | OUTPATIENT
Start: 2022-08-31 | End: 2022-09-01 | Stop reason: HOSPADM

## 2022-08-31 RX ORDER — SODIUM CHLORIDE 9 MG/ML
25 INJECTION, SOLUTION INTRAVENOUS PRN
Status: DISCONTINUED | OUTPATIENT
Start: 2022-08-31 | End: 2022-08-31 | Stop reason: HOSPADM

## 2022-08-31 RX ORDER — TRIAMCINOLONE ACETONIDE 40 MG/ML
INJECTION, SUSPENSION INTRA-ARTICULAR; INTRAMUSCULAR PRN
Status: DISCONTINUED | OUTPATIENT
Start: 2022-08-31 | End: 2022-08-31 | Stop reason: ALTCHOICE

## 2022-08-31 RX ORDER — INSULIN LISPRO 100 [IU]/ML
0-4 INJECTION, SOLUTION INTRAVENOUS; SUBCUTANEOUS NIGHTLY
Status: DISCONTINUED | OUTPATIENT
Start: 2022-08-31 | End: 2022-09-01 | Stop reason: HOSPADM

## 2022-08-31 RX ORDER — LOSARTAN POTASSIUM AND HYDROCHLOROTHIAZIDE 25; 100 MG/1; MG/1
1 TABLET ORAL DAILY
Status: DISCONTINUED | OUTPATIENT
Start: 2022-08-31 | End: 2022-08-31

## 2022-08-31 RX ORDER — ROCURONIUM BROMIDE 10 MG/ML
INJECTION, SOLUTION INTRAVENOUS PRN
Status: DISCONTINUED | OUTPATIENT
Start: 2022-08-31 | End: 2022-08-31 | Stop reason: SDUPTHER

## 2022-08-31 RX ORDER — NEOSTIGMINE METHYLSULFATE 1 MG/ML
INJECTION, SOLUTION INTRAVENOUS PRN
Status: DISCONTINUED | OUTPATIENT
Start: 2022-08-31 | End: 2022-08-31 | Stop reason: SDUPTHER

## 2022-08-31 RX ORDER — OXYCODONE HYDROCHLORIDE 5 MG/1
5 TABLET ORAL EVERY 4 HOURS PRN
Status: DISCONTINUED | OUTPATIENT
Start: 2022-08-31 | End: 2022-09-01 | Stop reason: HOSPADM

## 2022-08-31 RX ORDER — GLYCOPYRROLATE 0.2 MG/ML
INJECTION INTRAMUSCULAR; INTRAVENOUS PRN
Status: DISCONTINUED | OUTPATIENT
Start: 2022-08-31 | End: 2022-08-31 | Stop reason: SDUPTHER

## 2022-08-31 RX ORDER — ONDANSETRON 2 MG/ML
INJECTION INTRAMUSCULAR; INTRAVENOUS PRN
Status: DISCONTINUED | OUTPATIENT
Start: 2022-08-31 | End: 2022-08-31 | Stop reason: SDUPTHER

## 2022-08-31 RX ORDER — ATORVASTATIN CALCIUM 20 MG/1
20 TABLET, FILM COATED ORAL NIGHTLY
Status: DISCONTINUED | OUTPATIENT
Start: 2022-08-31 | End: 2022-09-01 | Stop reason: HOSPADM

## 2022-08-31 RX ORDER — BUPIVACAINE HYDROCHLORIDE 5 MG/ML
INJECTION, SOLUTION EPIDURAL; INTRACAUDAL PRN
Status: DISCONTINUED | OUTPATIENT
Start: 2022-08-31 | End: 2022-08-31 | Stop reason: ALTCHOICE

## 2022-08-31 RX ADMIN — FENTANYL CITRATE 50 MCG: 50 INJECTION, SOLUTION INTRAMUSCULAR; INTRAVENOUS at 10:39

## 2022-08-31 RX ADMIN — OXYCODONE HYDROCHLORIDE 10 MG: 10 TABLET ORAL at 20:41

## 2022-08-31 RX ADMIN — PROPOFOL 200 MG: 10 INJECTION, EMULSION INTRAVENOUS at 07:10

## 2022-08-31 RX ADMIN — OXYCODONE HYDROCHLORIDE 10 MG: 10 TABLET ORAL at 13:00

## 2022-08-31 RX ADMIN — SODIUM CHLORIDE, POTASSIUM CHLORIDE, SODIUM LACTATE AND CALCIUM CHLORIDE: 600; 310; 30; 20 INJECTION, SOLUTION INTRAVENOUS at 09:47

## 2022-08-31 RX ADMIN — ROCURONIUM BROMIDE 50 MG: 10 INJECTION, SOLUTION INTRAVENOUS at 07:10

## 2022-08-31 RX ADMIN — SODIUM CHLORIDE: 9 INJECTION, SOLUTION INTRAVENOUS at 06:57

## 2022-08-31 RX ADMIN — CEFAZOLIN 2000 MG: 2 INJECTION, POWDER, FOR SOLUTION INTRAMUSCULAR; INTRAVENOUS at 07:17

## 2022-08-31 RX ADMIN — HYDROMORPHONE HYDROCHLORIDE 0.5 MG: 1 INJECTION, SOLUTION INTRAMUSCULAR; INTRAVENOUS; SUBCUTANEOUS at 10:54

## 2022-08-31 RX ADMIN — ACETAMINOPHEN 650 MG: 325 TABLET, FILM COATED ORAL at 12:59

## 2022-08-31 RX ADMIN — FENTANYL CITRATE 100 MCG: 50 INJECTION, SOLUTION INTRAMUSCULAR; INTRAVENOUS at 07:10

## 2022-08-31 RX ADMIN — CALCIUM CARBONATE 500 MG: 500 TABLET, CHEWABLE ORAL at 20:42

## 2022-08-31 RX ADMIN — SODIUM CHLORIDE 5 ML/HR: 9 INJECTION, SOLUTION INTRAVENOUS at 05:52

## 2022-08-31 RX ADMIN — CEFAZOLIN 2000 MG: 2 INJECTION, POWDER, FOR SOLUTION INTRAMUSCULAR; INTRAVENOUS at 23:56

## 2022-08-31 RX ADMIN — ROPIVACAINE HYDROCHLORIDE 30 ML: 5 INJECTION EPIDURAL; INFILTRATION; PERINEURAL at 06:40

## 2022-08-31 RX ADMIN — HYDROMORPHONE HYDROCHLORIDE 0.5 MG: 1 INJECTION, SOLUTION INTRAMUSCULAR; INTRAVENOUS; SUBCUTANEOUS at 11:11

## 2022-08-31 RX ADMIN — ACETAMINOPHEN 650 MG: 325 TABLET, FILM COATED ORAL at 17:07

## 2022-08-31 RX ADMIN — CEFAZOLIN 2000 MG: 2 INJECTION, POWDER, FOR SOLUTION INTRAMUSCULAR; INTRAVENOUS at 17:07

## 2022-08-31 RX ADMIN — ONDANSETRON HYDROCHLORIDE 4 MG: 2 SOLUTION INTRAMUSCULAR; INTRAVENOUS at 09:47

## 2022-08-31 RX ADMIN — MIDAZOLAM 2 MG: 1 INJECTION INTRAMUSCULAR; INTRAVENOUS at 06:51

## 2022-08-31 RX ADMIN — DEXAMETHASONE SODIUM PHOSPHATE 10 MG: 10 INJECTION INTRAMUSCULAR; INTRAVENOUS at 07:24

## 2022-08-31 RX ADMIN — FENTANYL CITRATE 100 MCG: 50 INJECTION, SOLUTION INTRAMUSCULAR; INTRAVENOUS at 06:51

## 2022-08-31 RX ADMIN — HYDROCHLOROTHIAZIDE 25 MG: 25 TABLET ORAL at 20:41

## 2022-08-31 RX ADMIN — GLYCOPYRROLATE 0.6 MG: 1 INJECTION INTRAMUSCULAR; INTRAVENOUS at 09:47

## 2022-08-31 RX ADMIN — ASPIRIN 81 MG: 81 TABLET, COATED ORAL at 13:01

## 2022-08-31 RX ADMIN — LIDOCAINE HYDROCHLORIDE 50 MG: 20 INJECTION, SOLUTION INTRAVENOUS at 07:10

## 2022-08-31 RX ADMIN — ATORVASTATIN CALCIUM 20 MG: 20 TABLET, FILM COATED ORAL at 20:42

## 2022-08-31 RX ADMIN — ROPIVACAINE HYDROCHLORIDE 30 ML: 5 INJECTION, SOLUTION EPIDURAL; INFILTRATION; PERINEURAL at 06:55

## 2022-08-31 RX ADMIN — LOSARTAN POTASSIUM 100 MG: 50 TABLET, FILM COATED ORAL at 20:41

## 2022-08-31 RX ADMIN — Medication 10 ML: at 20:42

## 2022-08-31 RX ADMIN — Medication 3 MG: at 09:47

## 2022-08-31 RX ADMIN — SODIUM CHLORIDE, PRESERVATIVE FREE 10 ML: 5 INJECTION INTRAVENOUS at 23:57

## 2022-08-31 RX ADMIN — SODIUM CHLORIDE: 9 INJECTION, SOLUTION INTRAVENOUS at 07:54

## 2022-08-31 RX ADMIN — ASPIRIN 81 MG: 81 TABLET, COATED ORAL at 20:42

## 2022-08-31 ASSESSMENT — PAIN DESCRIPTION - PAIN TYPE
TYPE: SURGICAL PAIN

## 2022-08-31 ASSESSMENT — PAIN DESCRIPTION - ORIENTATION
ORIENTATION: RIGHT

## 2022-08-31 ASSESSMENT — PAIN SCALES - GENERAL
PAINLEVEL_OUTOF10: 8
PAINLEVEL_OUTOF10: 3
PAINLEVEL_OUTOF10: 2
PAINLEVEL_OUTOF10: 7
PAINLEVEL_OUTOF10: 7
PAINLEVEL_OUTOF10: 4
PAINLEVEL_OUTOF10: 7

## 2022-08-31 ASSESSMENT — PAIN DESCRIPTION - DESCRIPTORS
DESCRIPTORS: SORE;TIGHTNESS
DESCRIPTORS: SORE;TIGHTNESS
DESCRIPTORS: TIGHTNESS
DESCRIPTORS: ACHING;DULL;SHARP
DESCRIPTORS: SORE;TIGHTNESS

## 2022-08-31 ASSESSMENT — PAIN DESCRIPTION - ONSET
ONSET: ON-GOING
ONSET: ON-GOING

## 2022-08-31 ASSESSMENT — PAIN - FUNCTIONAL ASSESSMENT
PAIN_FUNCTIONAL_ASSESSMENT: PREVENTS OR INTERFERES SOME ACTIVE ACTIVITIES AND ADLS
PAIN_FUNCTIONAL_ASSESSMENT: 0-10
PAIN_FUNCTIONAL_ASSESSMENT: ACTIVITIES ARE NOT PREVENTED

## 2022-08-31 ASSESSMENT — PAIN DESCRIPTION - LOCATION
LOCATION: LEG
LOCATION: NECK;HIP
LOCATION: LEG
LOCATION: LEG
LOCATION: HIP

## 2022-08-31 ASSESSMENT — PAIN DESCRIPTION - FREQUENCY
FREQUENCY: CONTINUOUS
FREQUENCY: CONTINUOUS

## 2022-08-31 NOTE — ANESTHESIA POSTPROCEDURE EVALUATION
Department of Anesthesiology  Postprocedure Note    Patient: Eliseo Mendez  MRN: [id number]  [place]: 1955  Date of evaluation: 8/31/2022      Procedure Summary     Date: 08/31/22 Room / Location: [facility]avre OR 09 / [facility]    Anesthesia Start: 6880 Anesthesia Stop: 6717    Procedure: RIGHT FEMUR  REVISION OPEN REDUCTION INTERNAL FIXATION . LEFT SHOULDER STEROID INJECTION.  (Right: Leg Upper) Diagnosis:       Periprosthetic fracture around internal prosthetic right knee joint, subsequent encounter      (PERIPROSTHETIC FRACTURE AROUND INTERNAL PROSTHETIC OF RIGHT KNEE JOINT)    Surgeons: Martin Subramanian DO Responsible Provider: Elkin Wood MD    Anesthesia Type: General ASA Status: 3          Anesthesia Type: General    Scar Phase I: Scar Score: 10    Scar Phase II:        Anesthesia Post Evaluation    Patient location during evaluation: PACU  Patient participation: complete - patient participated  Level of consciousness: awake and alert  Airway patency: patent  Nausea & Vomiting: no nausea and no vomiting  Complications: no  Cardiovascular status: hemodynamically stable  Respiratory status: acceptable  Hydration status: euvolemic

## 2022-08-31 NOTE — INTERVAL H&P NOTE
Update History & Physical    The patient's History and Physical of August 25, 2022 was reviewed with the patient and I examined the patient. There was no change. The surgical site was confirmed by the patient and me. Plan:   Right femur nonunion/malunion revision ORIF, discussed we would obtain intraoperative pathology as well as culture analysis to ensure no indolent infection. Discussed if infection were to be present would alter our treatment strategy which would involve some form of antibiotic coating or antibiotic beads. I have again explained the risks and complications of the recommended surgery with the patient at length, as well as discussed potential treatment alternatives including nonoperative management. These risks include but are not limited to death or complication from anesthesia, continued pain, nerve tendon or vascular injury, infection, nonunion or malunion, symptomatic hardware or hardware failure, deep vein thrombosis or pulmonary embolism, stiffness, wound healing complications, unforeseen complications, and need for further surgery, etc.  Patient understood this, asked appropriate questions, which were all answered, and she has elected to proceed with the procedure. No guarantees were provided. Patient understands despite revision surgery there is no guarantee that she will go on to appropriate union. Discussed her blood glucose and strict monitoring and management.     Electronically signed by Leonard Espinosa DO on 8/31/2022 at 6:53 AM

## 2022-08-31 NOTE — CONSULTS
Hospital Medicine  Consult History & Physical        Reason for consult:  medical management post-op    Date of Service: Pt seen/examined in consultation on 8/31/2022    History Of Present Illness:    Ms. Anthony Ramos, a 79y.o. year old female  who  has a past medical history of Basedow's disease, Chronic nonalcoholic liver disease, Diabetes ([address] Fracture of right lower extremity, Hyperlipidemia, Hypertension, Hypothyroidism, Malignant neoplasm of ovary ([address] Obesity, Positive FIT (fecal immunochemical test), Type II or unspecified type diabetes mellitus without mention of complication, not stated as uncontrolled, and Unspecified vitamin D deficiency. Patient was seen postop for medical management. Patient recently had a right femur ORIF injury on 3/12/2022 after had a fall. Patient had ORIF done in [place] and has been doing well with ambulation with the help of a walker since. Mentions that over the last few weeks she has been having difficulty with ambulation to the point where she has been having decreased range of motion affecting her daily activities. She has been following up with orthopedics surgery who decided to go ahead with revision of ORIF due to malunion  Patient at this time appears to be doing well postsurgery.   Takes medication for high blood pressure, hypothyroidism, diabetes mellitus type 2    Past Medical History:        Diagnosis Date    Basedow's disease 01/14/2016    Overview:  [facility] [address] [facility], 2001    Chronic nonalcoholic liver disease 13/17/3667    Diabetes ([address] 2019    Fracture of right lower extremity     Hyperlipidemia     Hypertension     Hypothyroidism     Malignant neoplasm of ovary ([address] 2001    Obesity     Positive FIT (fecal immunochemical test) 04/26/2018    Type II or unspecified type diabetes mellitus without mention of complication, not stated as uncontrolled     Unspecified vitamin D deficiency 01/14/2016       Past Surgical History:        Procedure Laterality Date    COLONOSCOPY      HYSTERECTOMY (CERVIX STATUS UNKNOWN)      JOINT REPLACEMENT      total knees bilateral    KNEE SURGERY Bilateral 2006/ 2009    MA COLONOSCOPY FLX DX W/COLLJ SPEC WHEN PFRMD N/A 5/30/2018    COLONOSCOPY performed by Mario Matos MD at [place] [address]       Medications Prior to Admission:    Prior to Admission medications    Medication Sig Start Date End Date Taking? Authorizing Provider   aspirin EC 81 MG EC tablet Take 1 tablet by mouth 2 times daily for 28 days 8/31/22 9/28/22 Yes Tulio [place], DO   traMADol (ULTRAM) 50 MG tablet Take 50 mg by mouth every 6 hours as needed for Pain. Yes Historical Provider, MD   Misc. Devices MISC STAIR LIFT  Dx: Arthritis pain, femur fracture, gait instability 8/29/22   Anatoly Vanegas MD   HYDROcodone-acetaminophen (NORCO) 5-325 MG per tablet Take 1 tablet by mouth every 8 hours as needed for Pain for up to 7 days. Intended supply: 7 days.  Take lowest dose possible to manage pain 8/26/22 9/2/22  Anatoly Vanegas MD   diclofenac sodium (VOLTAREN) 1 % GEL Apply 2 g topically 4 times daily as needed for Pain 8/23/22   Anatoly Vanegas MD   indomethacin (INDOCIN SR) 75 MG extended release capsule Take 1 capsule by mouth 2 times daily (with meals) 8/23/22   Anatoly Vanegas MD   metFORMIN (GLUCOPHAGE) 1000 MG tablet Take 1 tablet by mouth 2 times daily (with meals) 5/26/22   Anatoly Vanegas MD   atorvastatin (LIPITOR) 20 MG tablet TAKE 1 TABLET DAILY 5/26/22   Anatoly Vanegas MD   levothyroxine (SYNTHROID) 200 MCG tablet Take 1 tablet by mouth Daily 5/23/22   Anatoly aVnegas MD   levothyroxine (SYNTHROID) 150 MCG tablet Take 1 tablet by mouth Twice a Week 5/23/22   Anatoly Vanegas MD   allopurinol (ZYLOPRIM) 100 MG tablet take 1 tablet by mouth once daily 5/17/22   Anatoly Vanegas MD   losartan-hydroCHLOROthiazide (HYZAAR) 100-25 MG per tablet take 1 tablet by mouth once daily 5/17/22   Vikki Rosas MD   clindamycin (CLEOCIN T) 1 % lotion apply to face twice a day 1/7/22   Historical Provider, MD   CALCIUM 600+D3 600-800 MG-UNIT TABS TAKE 1 TABLET BY MOUTH TWICE DAILY WITH BREAKFAST AND SUPPER 3/17/22   Historical Provider, MD   Ascorbic Acid (VITAMIN C) 1000 MG tablet TAKE 1 TABLET BY MOUTH DAILY FOR 30 DAYS 3/16/22   Historical Provider, MD   Blood Glucose Monitoring Suppl (ONE TOUCH ULTRA 2) w/Device KIT Check blood sugar qam 8/16/21   Vikki Rosas MD   ONE TOUCH ULTRASOFT LANCETS MISC Check blood sugar every morning 8/16/21   Vikki Rosas MD   blood glucose test strips (ONETOUCH ULTRA) strip Check blood sugar every morning 8/16/21   Vikki Rosas MD   ketoconazole (NIZORAL) 2 % cream Apply topically daily. 8/12/21   Vikki Rosas MD   metroNIDAZOLE, TOPICAL, 0.75 % LOTN Apply to face BID 8/12/21   Vikki Rosas MD   triamcinolone (KENALOG) 0.1 % cream Apply bid to affected areas prn 8/12/21   Vikki Rosas MD   vitamin D (ERGOCALCIFEROL) 29465 units CAPS capsule Take 1 capsule by mouth once a week 10/3/18   Vikki Rosas MD   Handicap Placard MISC by Does not apply route Unable to ambulate more than 60 feet without difficulty  Expires 10/31/2022 10/25/17   Vikki Rosas MD       Allergies:  Iodine    Social History:      TOBACCO:   reports that she has never smoked. She has never used smokeless tobacco.  ETOH:   reports current alcohol use. Family History:      Reviewed in detail and negative for DM, CAD, Cancer, CVA. Positive as follows:        Problem Relation Age of Onset    Early Death Mother        REVIEW OF SYSTEMS:   Pertinent positives as noted in the HPI. All other systems reviewed and negative.     PHYSICAL EXAM:  BP (!) 147/64   Pulse 76   Temp 97.7 °F (36.5 °C)   Resp 17   Ht 5' 5\" (1.651 m)   Wt 220 lb (99.8 kg) SpO2 91%   BMI 36.61 kg/m²   General appearance: No apparent distress, appears stated age and cooperative. HEENT: Normal cephalic, atraumatic without obvious deformity. Pupils equal, round, and reactive to light. Extra ocular muscles intact. Conjunctivae/corneas clear. Neck: Supple, with full range of motion. No jugular venous distention. Trachea midline. Respiratory:  Normal respiratory effort. Clear to auscultation, bilaterally without crackles or rhonchi  Cardiovascular: regular  rate, sinus rhythm with normal S1/S2 , with no murmurs  Abdomen: Soft, non-tender, non-distended with normal bowel sounds. Musculoskeletal:  No clubbing, cyanosis or edema bilaterally. Decreased ROM 2/2 pain in the right lower extremity  Skin: Skin color, texture, turgor normal.  No rashes or lesions. Neurologic:  No focal deficits, alert and oriented  Psychiatric: Alert and oriented, thought content appropriate, normal insight    Labs:     CBC:   No results for input(s): WBC, RBC, HGB, HCT, MCV, RDW, PLT in the last 72 hours. BMP: No results for input(s): NA, K, CL, CO2, BUN, CREATININE, CA, MG, PHOS in the last 72 hours. LFT:  No results for input(s): PROT, ALB, ALKPHOS, ALT, AST, BILITOT, AMYLASE, LIPASE in the last 72 hours. CE:  No results for input(s): Rachid Bigness in the last 72 hours. PT/INR: No results for input(s): INR, APTT in the last 72 hours. BNP: No results for input(s): BNP in the last 72 hours.   Hgb A1C:   Lab Results   Component Value Date    LABA1C 6.7 (H) 08/23/2022     No results found for: EAG  ESR:   Lab Results   Component Value Date    SEDRATE 50 (H) 08/23/2022     CRP:   Lab Results   Component Value Date    CRP 1.0 (H) 08/23/2022     D Dimer: No results found for: DDIMER  Folate and B12: No results found for: [id number], No results found for: FOLATE  Lactic Acid: No results found for: LACTA  Thyroid Studies:   Lab Results   Component Value Date    TSH 0.655 07/20/2022 ASSESSMENT:  Right femur nonunion/malunion revision ORIF  HTN  Gout  DM type 2  Hypothyroidism  Vit D def    PLAN:  - s/p ORIF right femur nonunion on 8/31/22, Right knee -negative for acute inflammation on frozen section . surgical path pending, anaerobic c/s pending  - PT/OT for dispo planning  - metformin held- on SSI and hypoglycemia protocol during stay , A1c reviewed and recent value of 6.7  - restarted home meds for HTN, hypothyroidism, gout  - Pain management  - Hb preop- 11.1- will repeat tomorrow      Diet: ADULT DIET; Regular  Thank you for allowing us to participate in this patient's care.    +++++++++++++++++++++++++++++++++++++++++++++++++  Maria Ines Leon MD  [place], [place]  +++++++++++++++++++++++++++++++++++++++++++++++++  NOTE: This report was transcribed using voice recognition software. Every effort was made to ensure accuracy; however, inadvertent computerized transcription errors may be present.

## 2022-08-31 NOTE — ANESTHESIA PROCEDURE NOTES
Peripheral Block    Patient location during procedure: pre-op  Reason for block: post-op pain management  Start time: 8/31/2022 6:40 AM  End time: 8/31/2022 6:50 AM  Staffing  Performed: anesthesiologist   Anesthesiologist: Elkin Wood MD  Preanesthetic Checklist  Completed: patient identified, IV checked, site marked, risks and benefits discussed, surgical/procedural consents, equipment checked, pre-op evaluation, timeout performed, anesthesia consent given, oxygen available, monitors applied/VS acknowledged, fire risk safety assessment completed and verbalized and blood product R/B/A discussed and consented  Peripheral Block   Patient position: supine  Prep: ChloraPrep  Provider prep: mask and sterile gloves  Patient monitoring: cardiac monitor, continuous pulse ox, IV access and oxygen  Block type: Femoral  Femoral crease  Laterality: right  Injection technique: single-shot  Guidance: ultrasound guided    Needle   Needle type: insulated echogenic nerve stimulator needle   Needle localization: ultrasound guidance  Assessment   Injection assessment: negative aspiration for heme, no paresthesia on injection, local visualized surrounding nerve on ultrasound and no intravascular symptoms  Paresthesia pain: none  Slow fractionated injection: yes  Hemodynamics: stable  Real-time US image taken/store: yes  Outcomes: uncomplicated    Medications Administered  ropivacaine (NAROPIN) injection 0.5% - Perineural   30 mL - 8/31/2022 6:40:00 AM

## 2022-08-31 NOTE — OP NOTE
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   Left glenohumeral osteoarthritis    Post-Op Diagnosis: Same       Procedure(s):  Right distal femoral shaft repair malunion with corrective osteotomy compression technique   Open reduction internal fixation right femoral shaft with lateral compression plate and screws  Left shoulder CSI injection      Surgeon(s):  Lina Alatorre DO    Assistant:   Resident: Enoch Beaulieu DO; Mary Ellen Mckeon DO; Jordan Osborne DO    Anesthesia: General    Estimated Blood Loss (mL): 481    Complications: None    Specimens:   ID Type Source Tests Collected by Time Destination   1 : RIGHT THIGH FOR CULTURE Tissue Tissue CULTURE, ANAEROBIC, CULTURE, FUNGUS (Canceled), GRAM STAIN (Canceled), CULTURE, SURGICAL, CULTURE WITH SMEAR, ACID FAST BACILLIUS (Canceled) Lina Alatorre DO 8/31/2022 0800    A : POLY FOR HIGH POWER FIELD Tissue Tissue SURGICAL PATHOLOGY Lina Alatorre DO 8/31/2022 1049        Implants:  Implant Name Type Inv. Item Serial No.  Lot No. LRB No. Used Action   SCREW BNE L36MM DIA4. 5MM PROX SALMA TIB S STL ST BR[employer]GETTE FULL - [id number]  SCREW BNE L36MM DIA4. 5MM PROX SALMA TIB S STL ST BR[place] FULL  [employer] [place]-[employer]  Right 1 Implanted   SCREW BNE L38MM DIA4. 5MM PROX SALMA TIB S STL ST [place] FULL - [id number]  SCREW BNE L38MM DIA4. 5MM PROX SALMA TIB S STL ST [place] FULL  DEPU[employer] [place]-  Right 3 Implanted   SCREW BNE L56MM DIA4. 5MM PROX SALMA TIB S STL ST [place] FULL - [id number]  SCREW BNE L56MM DIA4. 5MM PROX SALMA TIB S STL ST BR[place] FULL  DEPUY [employer] [place]-  Right 1 Implanted   SCREW BNE L58MM DIA4. 5MM PROX SALMA TIB S STL ST [place]TE FULL - [id number]  SCREW BNE L58MM DIA4. 5MM PROX SALMA TIB S STL ST BR[place]GETTE FULL  DEPUY [employer] [place]-  Right 1 Implanted   PLATE BNE [id number] 14 H R CNDYL S STL CRV HARDIK ANG BRIDGETTE COMPR - [id number]  PLATE BNE [id number] 14 H R CNDYL S STL CRV HARDIK ANG BRIDGETTE COMPR  [employer] [place]-WD  Right 1 Implanted   SCREW BNE L28MM DIA5MM CNDYL S STL ST HARDIK ANG BRIDGETTE FULL THRD - [id number]  SCREW BNE L28MM DIA5MM CNDYL S STL ST HARDIK ANG BRIDGETTE FULL THRD  [employer] [place]-WD  Right 3 Implanted   SCREW BNE L32MM DIA5MM CNDYL S STL ST HARDIK ANG BRIDGETTE T25 - [id number]  SCREW BNE L32MM DIA5MM CNDYL S STL ST HARDIK ANG BRIDGETTE T25  [employer] [place]-WD  Right 1 Implanted   SCREW BNE L42MM DIA5MM CNDYL S STL ST HARDIK ANG BRIDGETTE T25 - [id number]  SCREW BNE L42MM DIA5MM CNDYL S STL ST HARDIK ANG BRIDGETTE T25  [employer]-WD  Right 1 Implanted   SCREW BNE L75MM DIA5MM CNDYL S STL ST HARDIK ANG BRIDGETTE T25 - [id number]  SCREW BNE L75MM DIA5MM CNDYL S STL ST HARDIK ANG BRIDGETTE T25  [employer] [place]-WD  Right 1 Implanted   GRAFT BONE SUBSTITUTE FIBERGRAFT 1CC - [id number]  GRAFT BONE SUBSTITUTE FIBERGRAFT 1CC  [facility] [id number] Right 1 Implanted   SCREW BNE L52MM DIA4. 5MM PROX SALMA TIB S STL ST BRIDGETTE FULL - [id number]  SCREW BNE L52MM DIA4. 5MM PROX SALMA TIB S STL ST BRIDGETTE FULL  DEPUY [employer] [place]-WD  Right 1 Implanted   SCREW BNE L54MM DIA4. 5MM PROX SALMA TIB S STL ST BRIDGETTE FULL - [id number]  SCREW BNE L54MM DIA4. 5MM PROX SALMA TIB S STL ST BRIDGETTE FULL  [employer] [place]-WD  Right 1 Implanted         Drains:   Urinary Catheter 08/31/22 [place] (Active)   Catheter Indications Perioperative use for selected surgical procedures 08/31/22 1207   Site Assessment No urethral drainage 08/31/22 1207   Urine Color Yellow 08/31/22 1207   Urine Appearance Clear 08/31/22 1207   Collection Container Standard 08/31/22 1207   Securement Method Leg strap 08/31/22 1207   Catheter Care Completed Yes 08/31/22 1207   Catheter Best Practices  Drainage tube clipped to bed; Tamper seal intact; Catheter secured to thigh; Bag below bladder;Lack of dependent loop in tubing;Bag not on floor;Drainage bag less than half full 08/31/22 1207   Status Draining 08/31/22 1207   Output (mL) 350 mL 08/31/22 1115       Findings: Intraoperative soft tissue negative for PMNs per pathologic report per high-powered field    Detailed Description of Procedure:   Patient brought to the operating suite where she was placed on operative table supine position. She received a general anesthetic by the department of anesthesia as well as 2 g of Ancef intravenously. Right lower extremity sterilely prepped and draped out in standard sterile fashion. Surgical timeout was performed per protocol by member surgical team.  Utilizing the previous lateral incision of the distal femur this is now open with the scalpel electrocautery taken subtends tissues. Iliotibial band was divided longitudinally with its fibers. Vastus lateralis was bluntly elevated anteriorly. We exposed the periprosthetic fracture site of the hardware and the malunion site. There is a significant mount of callus formation due to the hypertrophic nonunion/malunion. We took soft tissue samples from this region utilizing curettes and rongeurs, good bleeding soft tissue was obtained this was sent to pathology for PMNs which was negative. We now started removing all the retained broken hardware. Screws were removed followed by the 2 fragments of broken plate and the 2 underlying cables were cut and removed after overlying bone was debrided. The more proximal screws were removed through a separate lateral incision more proximally. We incised to the skin and subcutaneous tissues with electrocautery iliotibial bands of biologic lab its fibers natural raphae in the muscle belly was divided down to the lateral cortex of the femur and the proximal portion of the plate let us remove the screws and plate. There was one retained broken screw within the nonunion site we able to identify this and remove this also.   Due to the abundant callus we now performed a corrective osteotomy, we had preoperatively templated her deformity both the coronal and sagittal planes utilized K wires under fluoroscopic imaging to delineate the planned osteotomy site and take a closing wedge bone anterior laterally at a predetermined angle. We hinged this on the soft tissues medially. We now completed the osteotomy with manipulation of the leg and we obtained good compression symmetrically across the osteotomy site in both views. We felt we had excellent reduction of her alignment in both AP and lateral views. We now utilized a mini distractor placed to 3.0 mm pins and further compressed the fracture site. A plate of appropriate length was then selected this was slid over the lateral aspect of the femur proper position was confirmed with fluoroscopy plate was pinned in position distally. We would visualize plate positioning proximally through the proximal wound. Plate was compressed against the distal segment using multiple compression screws and multiple locking screws were placed in the articular block trying to strategically places around the stem and TKA component which was well fixated. Once we had fixation of her distal block and distal fracture segments we now used an AO tensioning device proximally placed this with the screw tension is to the plate and significantly compressed the osteotomy and fracture site. Fluoroscopy was again utilized confirm appropriate duction in all views and excellent compression of the osteotomy site. Multiple bicortical screws then placed to the intact shaft segment proximally of note these were placed eccentrically for added compression. At this point time we felt we had excellent correction of her deformity and of the fracture osteotomy site. The 2 tensioning devices were removed. Final images were taken saved to BufferBox system. Wounds were thoroughly irrigated with saline solution. Vancomycin powder was applied about the deep hardware standard layered closure was utilized. Island dressings were applied followed by compressive Ace wrap.   We now sterilely prepped the left shoulder anteriorly and placed a steroid injection into the left glenohumeral joint without issue, Band-Aid was applied. Patient was now extubated uneventfully transferred onto the [facility] into the postanesthesia care in stable condition. Postoperative plans:  Patient will be admitted overnight for postoperative pain control and 24 hours of postoperative antibiotics. We consulted medical services for comanagement due to her medical comorbidities. She will be toe-touch weightbearing only on the affected extremity. Should be started on chemical DVT prophylaxis postoperative day 1. Once discharged from the hospital she will follow-up in orthopedic office in 2 weeks for repeat evaluation. Electronically signed by Bharath Zarco DO on 8/31/2022    NOTE: This report was transcribed using voice recognition software.  Every effort was made to ensure accuracy; however, inadvertent computerized transcription errors may be present

## 2022-08-31 NOTE — PROGRESS NOTES
[] Motor Control     OT PLAN OF CARE   OT POC based on physician orders, patient diagnosis and results of clinical assessment    Frequency/Duration   2-4  days/wk for 1 week PRN   Specific OT Treatment Interventions to include:   * Instruction/training on adapted ADL techniques and AE recommendations to increase functional independence within precautions       * Training on energy conservation strategies, correct breathing pattern and techniques to improve independence/tolerance for self-care routine- issued spirometer for edema and pain control. * Functional transfer/mobility training/DME recommendations for increased independence, safety, and fall prevention  * Patient/Family education to increase follow through with safety techniques and functional independence  * Recommendation of environmental modifications for increased safety with functional transfers/mobility and ADLs  * Therapeutic activities to facilitate/challenge dynamic balance, stand tolerance for increased safety and independence with ADLs  * Therapeutic activities to facilitate gross/fine motor skills for increased independence with ADLs  * Positioning to improve skin integrity, interaction with environment and functional independence    Recommended Adaptive Equipment: TBD     Home Living: Pt lives with  present in a 2 story home with 1+1 steps and no hand rails. Bed and bath on second floor with chair lift and half bath up 2 steps and no HR's. A chair lift will be installed.   Bathroom setup: tub shower with seat and HR   Equipment owned: elevated with HR    Prior Level of Function: mod I  with ADLs , assisted with IADLs; ambulated ww  Driving: no  Occupation: retired  Medication management: self  Leisure: shopping    Pain Level: L shoulder min with mod RLE pain with movement    Cognition: A&O: 4/4; Follows one step directions   Memory:  good-   Sequencing:  good-   Problem solving:  good-   Judgement/safety:  good-     Functional Assessment:  AM-PAC Daily Activity Raw Score: 14/24   Initial Eval Status  Date: 8/31/22 Treatment Status  Date: STGs = LTGs  Time frame: 2-4days   Feeding Independent     Grooming Setup sitting   Mod I standing with ww   UB Dressing Min A gown change  Mod I    LB Dressing dependent  Mod I with AE prn   Bathing Simulated max A   SBA seated with LHS    Toileting Dependent catheter   Mod I with ww to elevated commode   Bed Mobility  Supine to sit: min A RLE  Sit to supine: Mod A B LE's  Supine to sit: mod I   Sit to supine: mod I    Functional Transfers Sit to stand min A with ww and cues for sequencing and safety  Mod I with ww   Functional Mobility Min A with ww  Mod I with ww   Balance Sitting:     Static:  good    Dynamic:SBA  Standing: min A                                                                        Activity Tolerance Fair limited by R LE weakness  O2 RA 98%  post activity  Good for ADL completion   Visual/  Perceptual Glasses: yes WFL          Safety Good-                                  Good      Hand Dominance R    AROM (PROM) Strength Additional Info:    RUE  WFL grossly  4/5 Good-  and wfl FMC/dexterity noted during ADL tasks       LUE * shoulder due to pain  4/5 Good-  and wfl FMC/dexterity noted during ADL tasks     Hearing: [facility]   Sensation:  decreased in R thigh otherwise No c/o numbness or tingling   Tone: WFL   Edema: min RLE    Comments: Upon arrival patient and  in room with patient supine and agreeable to evaluation. Performed OT evaluation with education on PWB to RLE with use of ww and safety with ADL completion and review of AE and DME. At end of session, patient supine with RLE elevated and HOB elevated and with call light and phone within reach, all lines and tubes intact. Nursing notified. Overall patient demonstrated  decreased independence and safety during completion of ADL/functional transfer/mobility tasks.   Pt would benefit from continued skilled OT to increase safety and independence with completion of ADL/IADL tasks for functional independence and quality of life. Treatment: OT treatment provided this date includes:   Instruction/training on safety and adapted techniques for completion of ADLs: to increase [place] in self care with AE/DME prn    Instruction/training on safe functional mobility/transfer techniques: with focus on safety, technique & precautions with ww   Instruction/training on energy conservation/work simplification for completion of ADLs: techniques to increase [place] with self care ADLs & iADLs, work     simplification to improve endurance   Proper Positioning/Alignment: for optimal healing, skin integrity to prevent breakdown, decrease edema  Skilled monitoring of vitals: to include BP, spO2 & HR during session  Sitting/standing Balance/Tolerance- to increased balance & activity tolerance during ADLs as well as facilitate proper posture and/or positioning. Therapeutic exercise- Instruction on L UE ROM exercises to improve strength/function for increased [place] with ADLs & iADLs     Rehab Potential: Good  for established goals     Patient / Family Goal: home with assist from  as needed      Patient and/or family were instructed on functional diagnosis, prognosis/goals and OT plan of care. Demonstrated good understanding. Eval Complexity: low    Time In: 15:00  Time Out: 15:25  Total Treatment Time: 10 min.       Min Units   OT Eval Low 38211  X     OT Eval Medium 11461      OT Eval High [id number]       OT Re-Eval [id number]       Therapeutic Ex [address]       Therapeutic Activities [address]2426  10  1   ADL/Self Care 54161       Orthotic Management 84151       Neuro Re-Ed 44589       Non-Billable Time          Evaluation Time includes thorough review of current medical information, gathering information on past medical history/social history and prior level of function, completion of standardized testing/informal observation of tasks, assessment of data and education on plan of care and goals. Nimco Anthony.  Guillermina 72, Alisha 70

## 2022-08-31 NOTE — DISCHARGE INSTRUCTIONS
Department of Orthopaedic Trauma  [address] MD Dr. Trudell Means, MD Rozella Elders, PA-C Sydnee Crome PA-C Francesca Campbell PA-C    Orthopaedics Discharge Instructions   Weight bearing Status - Non-weight bearing - on right lower Extremity  Pain medication Per Prescriptions  Contact Office for Medication Refill- [phone]  Office can refill pain med every 7 days  If patient discharging to facility then pain control will be continued per facility physician  Ice to operative/injured site for 15-30 minutes of each hour for next 5 days    Recommend that you continue to ice the area 2-3 times per day after this   Elevate operative/injured limb on 2 pillows at home  Goal is to have limb above the heart if able  Continue DVT Prophylaxis (blood clot prevention) as Prescribed: aspirin 81 mg twice daily   Wound care - keep dressing clean and dry  Fracture Care -  non-weight bearing to right lower extremity   Follow Up in Office in 2 weeks. Your first post op appointment is often with one of our PAs. Call the office at [phone] for directions or with any questions. Watch for these significant complications. Call physician if they or any other problems occur:  Fever over 101°, redness, swelling or warmth at the operative site  Unrelieved nausea    Foul smelling or cloudy drainage at the operative site   Unrelieved pain    Blood soaked dressing.  (Some oozing may be normal)     Numb, pale, blue, cold or tingling extremity    Future Appointments   Date Time Provider Amado Gustafson   9/19/2022 12:30 PM SCHEDULE, SE ORTH[facility]   10/17/2022 12:30 PM SCHEDULE, SE ORTH[facility]   11/17/2022  1:30 PM Aly Long DO [facility]   11/28/2022  8:30 AM Zia Cardona MD [facility]       Directions to Orthopaedic Trauma Office at [facility]:   [address] [place] [address] Todd Ching  Enter through the ED parking lot off [address]  At far side of the parking lot is [address] (large blue awning)-- Enter here  Our office is straight ahead through this entrance and check in will be on your left        It is the Department of Orthopaedic Trauma's standard of practice that providers will de-escalate(wean) all patients from narcotic(opioid) medications during the post-operative period. We provide multimodal pain control but opioid medications are tapered in all of our patients. If patient requires referral to pain management for prolonged taper off of opioid pain medication we will facilitate this process.

## 2022-08-31 NOTE — ANESTHESIA PRE PROCEDURE
Department of Anesthesiology  Preprocedure Note       Name:  Mi Castillo   Age:  79 y.o.  :  1955                                          MRN:  [id number]         Date:  2022      Surgeon: Kirk Rizzo):  Dianne Sevilla DO    Procedure: Procedure(s):  RIGHT FEMUR  REVISION OPEN REDUCTION INTERNAL FIXATION -- SYNTHES    Medications prior to admission:   Prior to Admission medications    Medication Sig Start Date End Date Taking? Authorizing Provider   traMADol (ULTRAM) 50 MG tablet Take 50 mg by mouth every 6 hours as needed for Pain. Yes Historical Provider, MD   Misc. Devices MISC STAIR LIFT  Dx: Arthritis pain, femur fracture, gait instability 22   Mariela Meyer MD   HYDROcodone-acetaminophen (NORCO) 5-325 MG per tablet Take 1 tablet by mouth every 8 hours as needed for Pain for up to 7 days. Intended supply: 7 days.  Take lowest dose possible to manage pain 22  aMriela Meyer MD   diclofenac sodium (VOLTAREN) 1 % GEL Apply 2 g topically 4 times daily as needed for Pain 22   Mariela Meyer MD   indomethacin (INDOCIN SR) 75 MG extended release capsule Take 1 capsule by mouth 2 times daily (with meals) 22   Mariela Meyer MD   metFORMIN (GLUCOPHAGE) 1000 MG tablet Take 1 tablet by mouth 2 times daily (with meals) 22   Mariela Meyer MD   atorvastatin (LIPITOR) 20 MG tablet TAKE 1 TABLET DAILY 22   Mariela Meyer MD   levothyroxine (SYNTHROID) 200 MCG tablet Take 1 tablet by mouth Daily 22   Mariela Meyer MD   levothyroxine (SYNTHROID) 150 MCG tablet Take 1 tablet by mouth Twice a Week 22   Mariela Meyer MD   allopurinol (ZYLOPRIM) 100 MG tablet take 1 tablet by mouth once daily 22   Mariela Meyer MD   losartan-hydroCHLOROthiazide [facility]) 100-25 MG per tablet take 1 tablet by mouth once daily 22   Mariela Meyer MD clindamycin (CLEOCIN T) 1 % lotion apply to face twice a day 1/7/22   Historical Provider, MD   CALCIUM 600+D3 600-800 MG-UNIT TABS TAKE 1 TABLET BY MOUTH TWICE DAILY WITH BREAKFAST AND SUPPER 3/17/22   Historical Provider, MD   Ascorbic Acid (VITAMIN C) 1000 MG tablet TAKE 1 TABLET BY MOUTH DAILY FOR 30 DAYS 3/16/22   Historical Provider, MD   Blood Glucose Monitoring Suppl (ONE TOUCH ULTRA 2) w/Device KIT Check blood sugar qam 8/16/21   Izaiah Redd MD   ONE TOUCH ULTRASOFT LANCETS MISC Check blood sugar every morning 8/16/21   Izaiah Redd MD   blood glucose test strips (ONETOUCH ULTRA) strip Check blood sugar every morning 8/16/21   Izaiah Redd MD   ketoconazole (NIZORAL) 2 % cream Apply topically daily.  8/12/21   Izaiah Redd MD   metroNIDAZOLE, TOPICAL, 0.75 % LOTN Apply to face BID 8/12/21   Iziaah Redd MD   triamcinolone (KENALOG) 0.1 % cream Apply bid to affected areas prn 8/12/21   Izaiah Redd MD   vitamin D (ERGOCALCIFEROL) 22434 units CAPS capsule Take 1 capsule by mouth once a week 10/3/18   Izaiah Redd MD   Handicap Placard MISC by Does not apply route Unable to ambulate more than 60 feet without difficulty  Expires 10/31/2022 10/25/17   Izaiah Redd MD       Current medications:    Current Facility-Administered Medications   Medication Dose Route Frequency Provider Last Rate Last Admin    lactated ringers infusion   IntraVENous Continuous Anna Peck PA-C        sodium chloride flush 0.9 % injection 5-40 mL  5-40 mL IntraVENous 2 times per day Anna Peck PA-C        sodium chloride flush 0.9 % injection 5-40 mL  5-40 mL IntraVENous PRN Anna Peck PA-C        0.9 % sodium chloride infusion   IntraVENous PRN Anna Peck PA-C 5 mL/hr at 08/31/22 0552 5 mL/hr at 08/31/22 0552    ceFAZolin (ANCEF) 2,000 mg in sterile water 20 mL IV syringe 2,000 mg IntraVENous See Admin Instructions Anna Peck PA-C        midazolam PF (VERSED) injection 2 mg  2 mg IntraVENous Once Trisha Petersen MD        fentaNYL (SUBLIMAZE) injection 100 mcg  100 mcg IntraVENous Once Trisha Petersen MD        ropivacaine (NAROPIN) 0.5% injection 30 mL  30 mL Infiltration Once Rupali Hubbard MD           Allergies: Allergies   Allergen Reactions    Iodine      fish allergy. IVP dye as well       Problem List:    Patient Active Problem List   Diagnosis Code    Hypothyroidism (acquired) E03.9    Type 2 diabetes mellitus with hyperglycemia, without long-term current use of insulin ([facility] E11.65    Morbid obesity with BMI of 40.0-44.9, adult ([facility] E66.01, Z68.41    Essential hypertension I10    Idiopathic chronic gout without tophus M1A. 00X0    Rosacea L71.9    Periprosthetic fracture of shaft of femur M97. 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    Overview:  [place], [address] Rx, 2001    Chronic nonalcoholic liver disease 79/35/9227    Diabetes ([facility] 2019    Fracture of right lower extremity     Hyperlipidemia     Hypertension     Hypothyroidism     Malignant neoplasm of ovary ([facility] 2001    Obesity     Positive FIT (fecal immunochemical test) 04/26/2018    Type II or unspecified type diabetes mellitus without mention of complication, not stated as uncontrolled     Unspecified vitamin D deficiency 01/14/2016       Past Surgical History:        Procedure Laterality Date    COLONOSCOPY      HYSTERECTOMY (CERVIX STATUS UNKNOWN)      JOINT REPLACEMENT      total knees bilateral    KNEE SURGERY Bilateral 2006/ 2009    [place] COLONOSCOPY FLX DX W/COLLJ SPEC WHEN PFRMD N/A 5/30/2018    COLONOSCOPY performed by Venus Dennis MD at [place] [address] Social History:    Social History     Tobacco Use    Smoking status: Never    Smokeless tobacco: Never   Substance Use Topics    Alcohol use: Yes     Comment: social                                Counseling given: Not Answered      Vital Signs (Current):   Vitals:    08/26/22 1040 08/31/22 0524   BP:  (!) 209/87   Pulse:  78   Resp:  18   Temp:  36.6 °C (97.8 °F)   TempSrc:  Temporal   SpO2:  96%   Weight: 220 lb (99.8 kg) 220 lb (99.8 kg)   Height: 5' 5\" (1.651 m) 5' 5\" (1.651 m)                                              BP Readings from Last 3 Encounters:   08/31/22 (!) 209/87   08/22/22 136/84   05/26/22 138/78       NPO Status: Time of last liquid consumption: 2200                        Time of last solid consumption: 2000                        Date of last liquid consumption: 08/30/22                        Date of last solid food consumption: 08/30/22    BMI:   Wt Readings from Last 3 Encounters:   08/31/22 220 lb (99.8 kg)   08/25/22 220 lb (99.8 kg)   08/17/22 215 lb (97.5 kg)     Body mass index is 36.61 kg/m².     CBC:   Lab Results   Component Value Date/Time    WBC 3.0 08/23/2022 08:02 AM    RBC 4.62 08/23/2022 08:02 AM    HGB 11.1 08/23/2022 08:02 AM    HCT 36.4 08/23/2022 08:02 AM    MCV 78.8 08/23/2022 08:02 AM    RDW 17.2 08/23/2022 08:02 AM     08/23/2022 08:02 AM       CMP:   Lab Results   Component Value Date/Time     08/23/2022 08:02 AM    K 4.8 08/23/2022 08:02 AM     08/23/2022 08:02 AM    CO2 26 08/23/2022 08:02 AM    BUN 21 08/23/2022 08:02 AM    CREATININE 0.9 08/23/2022 08:02 AM    GFRAA >60 08/23/2022 08:02 AM    LABGLOM >60 08/23/2022 08:02 AM    GLUCOSE 126 08/23/2022 08:02 AM    PROT 7.6 08/23/2022 08:02 AM    CALCIUM 9.5 08/23/2022 08:02 AM    BILITOT 0.4 08/23/2022 08:02 AM    ALKPHOS 345 08/23/2022 08:02 AM    AST 28 08/23/2022 08:02 AM    ALT 31 08/23/2022 08:02 AM       POC Tests: No results for input(s): POCGLU, POCNA, POCK, POCCL, POCBUN, POCHEMO, POCHCT in the last 72 hours. Coags:   Lab Results   Component Value Date/Time    PROTIME 11.6 08/23/2022 08:02 AM    INR 1.0 08/23/2022 08:02 AM       HCG (If Applicable): No results found for: PREGTESTUR, PREGSERUM, HCG, HCGQUANT     ABGs: No results found for: PHART, PO2ART, BCD5BJB, KPX7OWH, BEART, Z0JZYZVM     Type & Screen (If Applicable):  No results found for: LABABO, LABRH    Drug/Infectious Status (If Applicable):  No results found for: HIV, HEPCAB    COVID-19 Screening (If Applicable): No results found for: COVID19        Anesthesia Evaluation  Patient summary reviewed no history of anesthetic complications:   Airway: Mallampati: II  TM distance: >3 FB   Neck ROM: full  Mouth opening: > = 3 FB   Dental: normal exam         Pulmonary:Negative Pulmonary ROS and normal exam                               Cardiovascular:    (+) hypertension:,       ECG reviewed  Rhythm: regular  Rate: normal                    Neuro/Psych:   Negative Neuro/Psych ROS              GI/Hepatic/Renal:   (+) liver disease ( [place]):,           Endo/Other:    (+) DiabetesType II DM, , hypothyroidism::., .                 Abdominal:   (+) obese,           Vascular: negative vascular ROS. Other Findings:           Anesthesia Plan      general     ASA 3       Induction: intravenous. MIPS: Prophylactic antiemetics administered. Anesthetic plan and risks discussed with patient. Use of blood products discussed with patient whom consented to blood products. Plan discussed with CRNA and attending. Niko Hi RN   8/31/2022    Chart reviewed, findings discussed with anesthesiologist and or SRNA. Anesthesia plan of care discussed with jericho Rico CRNA      ------DOS anesthesiologist addendum-----  Patient seen and evaluated. Plan for GETA with preoperative right femoral peripheral nerve block discussed with patient. All patient's questions were answered to her satisfaction.   Patient consents to and agrees to GETA with preoperative right femoral peripheral nerve block. Risks and benefits of preoperative right femoral peripheral nerve block discussed with patient. All her questions were answered to her satisfaction. Patient consents to and agrees to right femoral peripheral nerve block.     Marija Cedeño MD  8/31/22

## 2022-08-31 NOTE — PROGRESS NOTES
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 Bed/bath is on 2nd floor with stair lift to access. Pt ambulated with ww PTA. OBJECTIVE:   Initial Evaluation  Date: 22 Treatment Short Term/ Long Term   Goals   AM-PAC 6 Clicks      Was pt agreeable to Eval/treatment? yes     Does pt have pain?  No pain     Bed Mobility  Rolling: min A  Supine to sit: min A  Sit to supine: min A  Scooting: min A  Rolling: mod I  Supine to sit: mod I  Sit to supine: mod I  Scooting: mod I   Transfers Sit to stand: min A  Stand to sit: min A  Stand pivot: min A with ww  Sit to stand: mod I  Stand to sit: mod I  Stand pivot: mod I with AAD   Ambulation    2'x2 with ww min A  (fwd/bwd, side stepping)  15'x2 with AAD mod I   Stair negotiation: ascended and descended  NT  1+1 steps with no handrail and AAD mod I     Strength/ROM:   RLE grossly 2+/5  LLE grossly 4/5  RLE AROM limited by weakness post-op  LLE AROM WFL    Balance:   Static Sitting: Supervision  Dynamic Sitting: SBA  Static Standing: CGA with ww  Dynamic Standing: min A with ww    Pt is A & O x 3  Sensation:  Pt denies numbness and tingling to extremities  Edema:  unremarkable    Therapeutic Exercises:    Bed mobility: supine<>sit, cued for EOB positioning  Transfers: STSx1, cued for hand placement and postural correction  Ambulation: 2'x2, fwd/bwd and side stepping  BLE AROM    Patient education  Pt educated on role of PT, importance of functional mobility during hospital stay, safety with functional mobility    Patient response to education:   Pt verbalized understanding Pt demonstrated skill Pt requires further education in this area   yes partial yes     ASSESSMENT:    Conditions Requiring Skilled Therapeutic Intervention:    [x]Decreased strength     [x]Decreased ROM  [x]Decreased functional mobility  [x]Decreased balance   [x]Decreased endurance   []Decreased posture  []Decreased sensation  []Decreased coordination   []Decreased vision  []Decreased safety awareness   []Increased pain       Comments:    Pt supine in bed upon entering, agreeable to participate. Pt instructed to transfer to EOB, completing transfer with assist of RLE. Pt sitting upright with good static sitting balance. Pt reported no dizziness or lightheadedness with position change. Pt cued for hand placement and was instructed to stand from EOB. Pt demonstrating good static standing balance with ww. Pt cued for sequencing and ww positioning while advancing BLE forward/backward and during side stepping to HOB. Pt with questionable ability to maintain WB status while in standing. Pt was assisted back to supine at the end of session. Pt positioned for comfort. All needs met and call bell in reach prior to exiting.      Treatment:  Patient practiced and was instructed in the following treatment:    Bed mobility training - pt given verbal and tactile cues to facilitate proper sequencing and safety during rolling and supine>sit as well as provided with physical assistance to complete task   STS and pivot transfer training - pt educated on proper hand and foot placement, safety and sequencing, and use of verbal and tactile cues to safely complete sit<>stand and pivot transfers with hands on assistance to complete task safely   Gait training- pt was given verbal and tactile cues to facilitate pt safety with ww use during ambulation as well as provided with physical assistance. Pt's/ family goals   1. Return home    Prognosis is fair for reaching above PT goals. Patient and or family understand(s) diagnosis, prognosis, and plan of care. yes    PHYSICAL THERAPY PLAN OF CARE:    PT POC is established based on physician order and patient diagnosis     Referring provider/PT Order:  Yeni Chappell DO  Diagnosis:  Periprosthetic fracture around internal prosthetic right knee joint, subsequent encounter [M97.11XD]  Closed disp comminuted fracture of shaft of right femur with nonunion [S72.351K]  Open disp comminutd fx shaft left radius, type 3, w/delayed healing [S52.338J]  Specific instructions for next treatment:  Progress as tolerated    Current Treatment Recommendations:     [x] Strengthening to improve independence with functional mobility   [x] ROM to improve independence with functional mobility   [x] Balance Training to improve static/dynamic balance and to reduce fall risk  [x] Endurance Training to improve activity tolerance during functional mobility   [x] Transfer Training to improve safety and independence with all functional transfers   [x] Gait Training to improve gait mechanics, endurance and assess need for appropriate assistive device  [x] Stair Training in preparation for safe discharge home and/or into the community   [] Positioning to prevent skin breakdown and contractures  [x] Safety and Education Training   [x] Patient/Caregiver Education   [] HEP  [] Other     PT long term treatment goals are located in above grid    Frequency of treatments: 2-5x/week x 1-2 weeks.     Time in  1456  Time out  1518    Total Treatment Time  10 minutes     Evaluation Time includes thorough review of current medical information, gathering information on past medical history/social history and prior level of function, completion of standardized testing/informal observation of tasks, assessment of data and education on plan of care and goals.     CPT codes:  [x] Low Complexity PT evaluation 39717  [] Moderate Complexity PT evaluation 40462  [] High Complexity PT evaluation 61542  [] PT Re-evaluation 74059  [] Gait training 71678 -- minutes  [] Manual therapy [id number] -- minutes  [x] Therapeutic activities 32307 10 minutes  [] Therapeutic exercises 43028 -- minutes  [] Neuromuscular reeducation 98630 -- minutes     Meseret Ram, PT, DPT  [id number]

## 2022-09-01 VITALS
RESPIRATION RATE: 18 BRPM | SYSTOLIC BLOOD PRESSURE: 78 MMHG | OXYGEN SATURATION: 93 % | DIASTOLIC BLOOD PRESSURE: 40 MMHG | HEART RATE: 78 BPM | TEMPERATURE: 98.1 F | BODY MASS INDEX: 36.65 KG/M2 | HEIGHT: 65 IN | WEIGHT: 220 LBS

## 2022-09-01 LAB
ANION GAP SERPL CALCULATED.3IONS-SCNC: 11 MMOL/L (ref 7–16)
BASOPHILS ABSOLUTE: 0.03 E9/L (ref 0–0.2)
BASOPHILS RELATIVE PERCENT: 0.7 % (ref 0–2)
BUN BLDV-MCNC: 18 MG/DL (ref 6–23)
CALCIUM SERPL-MCNC: 8.6 MG/DL (ref 8.6–10.2)
CHLORIDE BLD-SCNC: 100 MMOL/L (ref 98–107)
CO2: 27 MMOL/L (ref 22–29)
CREAT SERPL-MCNC: 0.8 MG/DL (ref 0.5–1)
EOSINOPHILS ABSOLUTE: 0.01 E9/L (ref 0.05–0.5)
EOSINOPHILS RELATIVE PERCENT: 0.2 % (ref 0–6)
GFR AFRICAN AMERICAN: >60
GFR NON-AFRICAN AMERICAN: >60 ML/MIN/1.73
GLUCOSE BLD-MCNC: 154 MG/DL (ref 74–99)
HCT VFR BLD CALC: 27.3 % (ref 34–48)
HCT VFR BLD CALC: 27.8 % (ref 34–48)
HEMOGLOBIN: 8.3 G/DL (ref 11.5–15.5)
HEMOGLOBIN: 8.6 G/DL (ref 11.5–15.5)
IMMATURE GRANULOCYTES #: 0.01 E9/L
IMMATURE GRANULOCYTES %: 0.2 % (ref 0–5)
LYMPHOCYTES ABSOLUTE: 1.47 E9/L (ref 1.5–4)
LYMPHOCYTES RELATIVE PERCENT: 36.6 % (ref 20–42)
MCH RBC QN AUTO: 23.4 PG (ref 26–35)
MCHC RBC AUTO-ENTMCNC: 30.4 % (ref 32–34.5)
MCV RBC AUTO: 77.1 FL (ref 80–99.9)
METER GLUCOSE: 146 MG/DL (ref 74–99)
METER GLUCOSE: 153 MG/DL (ref 74–99)
MONOCYTES ABSOLUTE: 1.04 E9/L (ref 0.1–0.95)
MONOCYTES RELATIVE PERCENT: 25.9 % (ref 2–12)
NEUTROPHILS ABSOLUTE: 1.46 E9/L (ref 1.8–7.3)
NEUTROPHILS RELATIVE PERCENT: 36.4 % (ref 43–80)
PDW BLD-RTO: 17.2 FL (ref 11.5–15)
PLATELET # BLD: 256 E9/L (ref 130–450)
PMV BLD AUTO: 12.9 FL (ref 7–12)
POTASSIUM REFLEX MAGNESIUM: 4.4 MMOL/L (ref 3.5–5)
RBC # BLD: 3.54 E12/L (ref 3.5–5.5)
SODIUM BLD-SCNC: 138 MMOL/L (ref 132–146)
WBC # BLD: 4 E9/L (ref 4.5–11.5)

## 2022-09-01 PROCEDURE — 97530 THERAPEUTIC ACTIVITIES: CPT

## 2022-09-01 PROCEDURE — 85025 COMPLETE CBC W/AUTO DIFF WBC: CPT

## 2022-09-01 PROCEDURE — 82962 GLUCOSE BLOOD TEST: CPT

## 2022-09-01 PROCEDURE — 85018 HEMOGLOBIN: CPT

## 2022-09-01 PROCEDURE — 97535 SELF CARE MNGMENT TRAINING: CPT

## 2022-09-01 PROCEDURE — 2580000003 HC RX 258: Performed by: STUDENT IN AN ORGANIZED HEALTH CARE EDUCATION/TRAINING PROGRAM

## 2022-09-01 PROCEDURE — 36415 COLL VENOUS BLD VENIPUNCTURE: CPT

## 2022-09-01 PROCEDURE — 6370000000 HC RX 637 (ALT 250 FOR IP): Performed by: STUDENT IN AN ORGANIZED HEALTH CARE EDUCATION/TRAINING PROGRAM

## 2022-09-01 PROCEDURE — 80048 BASIC METABOLIC PNL TOTAL CA: CPT

## 2022-09-01 PROCEDURE — 85014 HEMATOCRIT: CPT

## 2022-09-01 RX ADMIN — OXYCODONE 5 MG: 5 TABLET ORAL at 10:47

## 2022-09-01 RX ADMIN — OXYCODONE HYDROCHLORIDE 10 MG: 10 TABLET ORAL at 15:17

## 2022-09-01 RX ADMIN — CHOLECALCIFEROL TAB 10 MCG (400 UNIT) 800 UNITS: 10 TAB at 08:56

## 2022-09-01 RX ADMIN — ACETAMINOPHEN 650 MG: 325 TABLET, FILM COATED ORAL at 00:52

## 2022-09-01 RX ADMIN — ASPIRIN 81 MG: 81 TABLET, COATED ORAL at 08:57

## 2022-09-01 RX ADMIN — POLYETHYLENE GLYCOL 3350 17 G: 17 POWDER, FOR SOLUTION ORAL at 09:03

## 2022-09-01 RX ADMIN — HYDROCHLOROTHIAZIDE 25 MG: 25 TABLET ORAL at 09:03

## 2022-09-01 RX ADMIN — LOSARTAN POTASSIUM 100 MG: 50 TABLET, FILM COATED ORAL at 08:57

## 2022-09-01 RX ADMIN — ACETAMINOPHEN 650 MG: 325 TABLET, FILM COATED ORAL at 12:12

## 2022-09-01 RX ADMIN — OXYCODONE HYDROCHLORIDE 10 MG: 10 TABLET ORAL at 05:49

## 2022-09-01 RX ADMIN — Medication 10 ML: at 08:56

## 2022-09-01 RX ADMIN — OXYCODONE HYDROCHLORIDE 10 MG: 10 TABLET ORAL at 00:52

## 2022-09-01 RX ADMIN — ACETAMINOPHEN 650 MG: 325 TABLET, FILM COATED ORAL at 06:22

## 2022-09-01 RX ADMIN — LEVOTHYROXINE SODIUM 200 MCG: 0.07 TABLET ORAL at 06:22

## 2022-09-01 ASSESSMENT — PAIN DESCRIPTION - LOCATION
LOCATION: SHOULDER
LOCATION: SHOULDER
LOCATION: HIP
LOCATION: HIP

## 2022-09-01 ASSESSMENT — PAIN DESCRIPTION - PAIN TYPE: TYPE: SURGICAL PAIN

## 2022-09-01 ASSESSMENT — PAIN SCALES - GENERAL
PAINLEVEL_OUTOF10: 7
PAINLEVEL_OUTOF10: 7
PAINLEVEL_OUTOF10: 4
PAINLEVEL_OUTOF10: 2
PAINLEVEL_OUTOF10: 4
PAINLEVEL_OUTOF10: 7

## 2022-09-01 ASSESSMENT — PAIN - FUNCTIONAL ASSESSMENT: PAIN_FUNCTIONAL_ASSESSMENT: PREVENTS OR INTERFERES SOME ACTIVE ACTIVITIES AND ADLS

## 2022-09-01 ASSESSMENT — PAIN DESCRIPTION - ONSET: ONSET: GRADUAL

## 2022-09-01 ASSESSMENT — PAIN DESCRIPTION - ORIENTATION
ORIENTATION: LEFT
ORIENTATION: RIGHT
ORIENTATION: RIGHT
ORIENTATION: LEFT

## 2022-09-01 ASSESSMENT — PAIN DESCRIPTION - DESCRIPTORS
DESCRIPTORS: THROBBING;POUNDING;ACHING
DESCRIPTORS: ACHING
DESCRIPTORS: SORE;DULL;ACHING
DESCRIPTORS: ACHING;DISCOMFORT;SHARP

## 2022-09-01 ASSESSMENT — PAIN DESCRIPTION - FREQUENCY: FREQUENCY: INTERMITTENT

## 2022-09-01 NOTE — PROGRESS NOTES
Hospitalist Progress Note      PCP: Yasmeen Giles MD    Date of Admission: 8/31/2022    Chief Complaint: medical management post-op    Subjective: Patient was seen at bedside and doing well, Mentions she could not get much sleep due to the door closing apparently. Work with PT/OT      Medications:  Reviewed    Infusion Medications    sodium chloride      dextrose       Scheduled Medications    sodium chloride flush  5-40 mL IntraVENous 2 times per day    polyethylene glycol  17 g Oral Daily    bisacodyl  10 mg Rectal Daily    acetaminophen  650 mg Oral Q6H    aspirin  81 mg Oral BID    atorvastatin  20 mg Oral Nightly    levothyroxine  200 mcg Oral Daily    insulin lispro  0-4 Units SubCUTAneous TID WC    insulin lispro  0-4 Units SubCUTAneous Nightly    losartan  100 mg Oral Daily    And    hydroCHLOROthiazide  25 mg Oral Daily    calcium carbonate  500 mg Oral Daily    And    vitamin D3  800 Units Oral Daily     PRN Meds: sodium chloride flush, sodium chloride, ondansetron **OR** ondansetron, fleet, oxyCODONE **OR** oxyCODONE, morphine **OR** morphine, glucose, dextrose bolus **OR** dextrose bolus, glucagon (rDNA), dextrose      Intake/Output Summary (Last 24 hours) at 9/1/2022 1202  Last data filed at 9/1/2022 0559  Gross per 24 hour   Intake 20 ml   Output 2200 ml   Net -2180 ml       Exam:    BP (!) 122/44   Pulse 78   Temp 98.1 °F (36.7 °C) (Temporal)   Resp 18   Ht 5' 5\" (1.651 m)   Wt 220 lb (99.8 kg)   SpO2 93%   BMI 36.61 kg/m²     General appearance: No apparent distress, appears stated age and cooperative. HEENT: Pupils equal, round, and reactive to light. Conjunctivae/corneas clear. Neck: Supple, with full range of motion. No jugular venous distention. Trachea midline. Respiratory:  Normal respiratory effort. Clear to auscultation, bilaterally without Rales/Wheezes/Rhonchi. Cardiovascular: Regular rate and rhythm with normal S1/S2 without murmurs, rubs or gallops.   Abdomen: Soft, non-tender, non-distended with normal bowel sounds. Musculoskeletal: No clubbing, cyanosis or edema bilaterally, decreased ROM due to pain, dressing intact  Skin: Skin color, texture, turgor normal.  No rashes or lesions. Neurologic:  No focal deficits    Labs:   Recent Labs     09/01/22  0450 09/01/22  0644   WBC 4.0*  --    HGB 8.3* 8.6*   HCT 27.3* 27.8*     --      Recent Labs     09/01/22  0450      K 4.4      CO2 27   BUN 18   CREATININE 0.8   CALCIUM 8.6     No results for input(s): AST, ALT, BILIDIR, BILITOT, ALKPHOS in the last 72 hours. No results for input(s): INR in the last 72 hours. No results for input(s): Drew Beets in the last 72 hours. 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 surgical path pending, anaerobic c/s pending  - Blood pressure/Sugars stable  - PT/OT for dispo planning  - metformin held- on SSI and hypoglycemia protocol during stay , A1c reviewed and recent value of 6.7  - restarted home meds for HTN, hypothyroidism, gout  - Pain management  - Hb preop- 11.1- will repeat and show 8.3-->8.6        DVT Prophylaxis: scds  Diet: ADULT DIET; Regular; 5 carb choices (75 gm/meal);  Low Fat/Low Chol/High Fiber/CRISTINA  Code Status: Prior      Yvette Da Silva MD

## 2022-09-01 NOTE — PLAN OF CARE
Problem: Discharge Planning  Goal: Discharge to home or other facility with appropriate resources  Outcome: Progressing  Flowsheets (Taken 8/31/2022 1551 by Viet Bermudez RN)  Discharge to home or other facility with appropriate resources:   Identify barriers to discharge with patient and caregiver   Identify discharge learning needs (meds, wound care, etc)   Refer to discharge planning if patient needs post-hospital services based on physician order or complex needs related to functional status, cognitive ability or social support system   Arrange for needed discharge resources and transportation as appropriate     Problem: Chronic Conditions and Co-morbidities  Goal: Patient's chronic conditions and co-morbidity symptoms are monitored and maintained or improved  Outcome: Progressing     Problem: Pain  Goal: Verbalizes/displays adequate comfort level or baseline comfort level  Outcome: Progressing     Problem: Safety - Adult  Goal: Free from fall injury  Outcome: Progressing     Problem: Musculoskeletal - Adult  Goal: Return mobility to safest level of function  Outcome: Progressing  Goal: Maintain proper alignment of affected body part  Outcome: Progressing  Goal: Return ADL status to a safe level of function  Outcome: Progressing     Problem: Genitourinary - Adult  Goal: Urinary catheter remains patent  Outcome: Progressing

## 2022-09-01 NOTE — PROGRESS NOTES
Physical Therapy  Rx    Name: Hector Mcnair  : 1955  MRN: [id number]      Date of Service: 2022    Evaluating PT:  Alisia Perez PT, DPT    Room #:  9998/4996-S  Diagnosis:  Periprosthetic fracture around internal prosthetic right knee joint, subsequent encounter [M97.11XD]  Closed disp comminuted fracture of shaft of right femur with nonunion [S72.351K]  Open disp comminutd fx shaft left radius, type 3, w/delayed healing [S52.352J]  PMHx/PSHx:  HLD, HTN, obesity, DM II, hypothyroidism, ovarian CA, L shoulder OA  Procedure/Surgery:  s/p Right distal femoral shaft repair malunion with corrective osteotomy compression technique, Open reduction internal fixation right femoral shaft with lateral compression plate and screws  Precautions:  TTWB RLE, fall risk  Equipment Needs:  Has necessary equipment for home. SUBJECTIVE:    Pt lives with spouse in a 2 story home with 1+1 steps to enter and no handrail. Bed/bath is on 2nd floor with stair lift to access. Pt ambulated with ww PTA. OBJECTIVE:   Initial Evaluation  Date: 22 Treatment  22 Short Term/ Long Term   Goals   AM-PAC 6 Clicks 52/79 56/86    Was pt agreeable to Eval/treatment? yes yes    Does pt have pain? No pain Minimal in knee. . States L shoulder less pain. Bed Mobility  Rolling: min A  Supine to sit: min A  Sit to supine: min A  Scooting: min A Rolling: Ind  Supine to sit: SBA  Sit to supine: SBA  Scooting: Ind Rolling: mod I  Supine to sit: mod I  Sit to supine: mod I  Scooting: mod I   Transfers Sit to stand: min A  Stand to sit: min A  Stand pivot: min A with ww Sit to stand: SBA  Stand to sit: SBA  Stand pivot: SBA with ww cues for sequencing and to maintain TTWB RLE. Sit to stand: mod I  Stand to sit: mod I  Stand pivot: mod I with AAD   Ambulation    2'x2 with ww min A  (fwd/bwd, side stepping) 20 feet with fww Min cues for TTWB RLE.   15'x2 with AAD mod I   Stair negotiation: ascended and descended  NT NT reviewed sequencing for 1 +1 1+1 steps with no handrail and AAD mod I     Strength/ROM:   RLE grossly 2+/5  LLE grossly 4/5  RLE AROM limited by weakness post-op  LLE AROM WFL    Balance:   Static Sitting: Supervision  Dynamic Sitting: SBA  Static Standing: CGA with ww  Dynamic Standing: min A with ww    Pt is A & O x 3  Sensation:  Pt denies numbness and tingling to extremities  Edema:  unremarkable    Therapeutic Exercises:    Bed mobility: supine<>sit, cued for EOB positioning  Transfers: STSx1, cued for hand placement and postural correction  Ambulation: 2'x2, fwd/bwd and side stepping  BLE AROM    Patient education  Pt educated on role of PT, importance of functional mobility during hospital stay, safety with functional mobility    Patient response to education:   Pt verbalized understanding Pt demonstrated skill Pt requires further education in this area   yes partial yes     ASSESSMENT:    Conditions Requiring Skilled Therapeutic Intervention:    [x]Decreased strength     [x]Decreased ROM  [x]Decreased functional mobility  [x]Decreased balance   [x]Decreased endurance   []Decreased posture  []Decreased sensation  []Decreased coordination   []Decreased vision  []Decreased safety awareness   []Increased pain       Comments:    Pt supine in bed upon entering, very agreeable to participate wants to return to home this date. Pt instructed to transfer to EOB, completing transfer with assist of RLE. Pt sitting upright with good static sitting balance. Pt reported no dizziness or lightheadedness with position change. Pt cued for hand placement and was instructed to stand from EOB. Pt demonstrating good static standing balance with ww. Pt cued for sequencing and ww positioning while advancing BLE forward/backward and during side stepping to HOB. Pt with questionable ability to maintain WB status while in standing. Pt was assisted back to supine at the end of session. Pt positioned for comfort.  All needs met and call bell in reach prior to exiting. Treatment:  Patient practiced and was instructed in the following treatment:    Bed mobility training - pt given verbal and tactile cues to facilitate proper sequencing and safety during rolling and supine>sit as well as provided with physical assistance to complete task   STS and pivot transfer training - pt educated on proper hand and foot placement, safety and sequencing, and use of verbal and tactile cues to safely complete sit<>stand and pivot transfers with hands on assistance to complete task safely   Gait training- pt was given verbal and tactile cues to facilitate pt safety with ww use during ambulation as well as provided with physical assistance. Pt's/ family goals   1. Return home    Prognosis is fair for reaching above PT goals. Patient and or family understand(s) diagnosis, prognosis, and plan of care.   yes    PHYSICAL THERAPY PLAN OF CARE:    PT POC is established based on physician order and patient diagnosis     Referring provider/PT Order:  Prem Ye DO  Diagnosis:  Periprosthetic fracture around internal prosthetic right knee joint, subsequent encounter [M97.11XD]  Closed disp comminuted fracture of shaft of right femur with nonunion [S72.351K]  Open disp comminutd fx shaft left radius, type 3, w/delayed healing [S52.488J]  Specific instructions for next treatment:  Progress as tolerated    Current Treatment Recommendations:     [x] Strengthening to improve independence with functional mobility   [x] ROM to improve independence with functional mobility   [x] Balance Training to improve static/dynamic balance and to reduce fall risk  [x] Endurance Training to improve activity tolerance during functional mobility   [x] Transfer Training to improve safety and independence with all functional transfers   [x] Gait Training to improve gait mechanics, endurance and assess need for appropriate assistive device  [x] Stair Training in preparation for safe discharge home and/or into the community   [] Positioning to prevent skin breakdown and contractures  [x] Safety and Education Training   [x] Patient/Caregiver Education   [] HEP  [] Other     PT long term treatment goals are located in above grid    Frequency of treatments: 2-5x/week x 1-2 weeks. Time in  1055  Time out  1120    Total Treatment Time  25 minutes     Evaluation Time includes thorough review of current medical information, gathering information on past medical history/social history and prior level of function, completion of standardized testing/informal observation of tasks, assessment of data and education on plan of care and goals.     CPT codes:  [] Low Complexity PT evaluation 44636  [] Moderate Complexity PT evaluation 24991  [] High Complexity PT evaluation 15770  [] PT Re-evaluation 25826  [] Gait training 81717 -- minutes  [] Manual therapy [id number] -- minutes  [x] Therapeutic activities 70238 25 minutes  [] Therapeutic exercises 70719 -- minutes  [] Neuromuscular reeducation 99903 -- minutes   Ruth Wright, [address] [facility] [place]

## 2022-09-01 NOTE — PLAN OF CARE
Problem: Discharge Planning  Goal: Discharge to home or other facility with appropriate resources  9/1/2022 1703 by Tim Coleman RN  Outcome: Progressing  9/1/2022 0309 by Jovany Novoa RN  Outcome: Progressing  Flowsheets (Taken 8/31/2022 1551 by Holly Russo RN)  Discharge to home or other facility with appropriate resources:   Identify barriers to discharge with patient and caregiver   Identify discharge learning needs (meds, wound care, etc)   Refer to discharge planning if patient needs post-hospital services based on physician order or complex needs related to functional status, cognitive ability or social support system   Arrange for needed discharge resources and transportation as appropriate     Problem: Chronic Conditions and Co-morbidities  Goal: Patient's chronic conditions and co-morbidity symptoms are monitored and maintained or improved  9/1/2022 1703 by Tim Coleman RN  Outcome: Progressing  9/1/2022 0309 by Jovany Novoa RN  Outcome: Progressing     Problem: Pain  Goal: Verbalizes/displays adequate comfort level or baseline comfort level  9/1/2022 1703 by Tim Coleman RN  Outcome: Progressing  9/1/2022 0309 by Jovany Novoa RN  Outcome: Progressing     Problem: Safety - Adult  Goal: Free from fall injury  9/1/2022 1703 by Tim Coleman RN  Outcome: Progressing  9/1/2022 0309 by Jovany Novoa RN  Outcome: Progressing     Problem: Musculoskeletal - Adult  Goal: Return mobility to safest level of function  9/1/2022 1703 by Tim Coleman RN  Outcome: Progressing  9/1/2022 0309 by Jovany Novoa RN  Outcome: Progressing     Problem: Musculoskeletal - Adult  Goal: Maintain proper alignment of affected body part  9/1/2022 1703 by Tim Coleman RN  Outcome: Progressing  9/1/2022 0309 by Jovany Novoa RN  Outcome: Progressing     Problem: Musculoskeletal - Adult  Goal: Return ADL status to a safe level of function  9/1/2022 1703 by Tim Coleman RN  Outcome: Progressing  9/1/2022 0309 by Jovany Novoa RN  Outcome: Progressing

## 2022-09-01 NOTE — PROGRESS NOTES
OCCUPATIONAL THERAPY TREATMENT NOTE    [facility] TREATMENT NOTE      Date:2022  Patient Name: Abdullahi Daniel  MRN: [id number]  : 1955  Room: [address] [place] [address]       Referring Provider:  Etelvina Rincon DO  Specific Provider Orders/Date: OT evaluation and treat 22     Evaluating OT: Ezekiel Banda. Yesika OTR/L #[id number]     Diagnosis: Periprosthetic fracture around internal prosthetic right knee joint, subsequent encounter [M97.11XD]  Closed disp comminuted fracture of shaft of right femur with nonunion [S72.351K]  Open disp comminutd fx shaft left radius, type 3, w/delayed healing [S52.352J]       Surgery: 22  RIGHT FEMUR  REVISION OPEN REDUCTION INTERNAL FIXATION   Past Surgical History         Past Surgical History:   Procedure Laterality Date    COLONOSCOPY        HYSTERECTOMY (CERVIX STATUS UNKNOWN)        JOINT REPLACEMENT         total knees bilateral    KNEE SURGERY Bilateral 2009    MD COLONOSCOPY FLX DX W/COLLJ SPEC WHEN PFRMD N/A 2018     COLONOSCOPY performed by Shana Camacho MD at [place] [address] History:  has a past medical history of Basedow's disease, Chronic nonalcoholic liver disease, Diabetes ([facility] [address] Fracture of right lower extremity, Hyperlipidemia, Hypertension, Hypothyroidism, Malignant neoplasm of ovary ([facility] [address] Obesity, Positive FIT (fecal immunochemical test), Type II or unspecified type diabetes mellitus without mention of complication, not stated as uncontrolled, and Unspecified vitamin D deficiency.       Precautions:  Fall Risk, TTWB to RLE     Assessment of current deficits   [x] Functional mobility             [x]ADLs           [] Strength                  []Cognition   [x] Functional transfers           [x] IADLs         [x] Safety Awareness   [x]Endurance   [] Fine Coordination              [x] Balance      [] Vision/perception   [x]Sensation     []Gross Motor Coordination  [] ROM           [] Delirium                   [] Motor Control      OT PLAN OF CARE   OT POC based on physician orders, patient diagnosis and results of clinical assessment     Frequency/Duration   2-4  days/wk for 1 week PRN   Specific OT Treatment Interventions to include:   * Instruction/training on adapted ADL techniques and AE recommendations to increase functional independence within precautions       * Training on energy conservation strategies, correct breathing pattern and techniques to improve independence/tolerance for self-care routine- issued spirometer for edema and pain control. * Functional transfer/mobility training/DME recommendations for increased independence, safety, and fall prevention  * Patient/Family education to increase follow through with safety techniques and functional independence  * Recommendation of environmental modifications for increased safety with functional transfers/mobility and ADLs  * Therapeutic activities to facilitate/challenge dynamic balance, stand tolerance for increased safety and independence with ADLs  * Therapeutic activities to facilitate gross/fine motor skills for increased independence with ADLs  * Positioning to improve skin integrity, interaction with environment and functional independence     Recommended Adaptive Equipment: pt. owns all A.E. & A.D. Home Living: Pt lives with  present in a 2 story home with 1+1 steps and no hand rails. Bed and bath on second floor with chair lift and half bath up 2 steps and no HR's. A chair lift will be installed.   Bathroom setup: tub shower with seat and HR   Equipment owned: elevated with HR     Prior Level of Function: mod I  with ADLs , assisted with IADLs; ambulated ww  Driving: no  Occupation: retired  Medication management: self  Leisure: shopping     Pain Level: L shoulder min & min L LE pain with movement    Cognition: A&O: 4/4; Follows one step directions              Memory:  good-              Sequencing:  good- Problem solving:  good-              Judgement/safety:  good-                Functional Assessment:  AM-PAC Daily Activity Raw Score: 16/24    Initial Eval Status  Date: 8/31/22 Treatment Status  Date: 9-1-22 STGs = LTGs  Time frame: 2-4days   Feeding Independent  Ind.     Grooming Setup sitting   set up seated Mod I standing with ww   UB Dressing Min A gown change SBA  Mod I    LB Dressing dependent  Max A  Pt. states owns all A.E. Mod I with AE prn   Bathing Simulated max A   Max A sim. Ax. Pt. States she plans to sponge bathe with family assist upon d/c SBA seated with LHS    Toileting Dependent catheter   NT Mod I with ww to elevated commode   Bed Mobility  Supine to sit: min A RLE  Sit to supine: Mod A B LE's  Sup > Sit: SBA Supine to sit: mod I   Sit to supine: mod I    Functional Transfers Sit to stand min A with ww and cues for sequencing and safety  Sit > Stand: SBA  SPT: SBA with ww Mod I with ww   Functional Mobility Min A with ww Min A with ww short home distances in room,  cues for TTWB technique, reviewed precautions with pt. &   Mod I with ww   Balance Sitting:     Static:  good    Dynamic:SBA  Standing: min A  Sitting:     Static:  good    Dynamic:SBA  Standing: min A                                                                        Activity Tolerance Fair limited by R LE weakness  O2 RA 98%  post activity  Fair/+ Good for ADL completion   Visual/  Perceptual Glasses: yes WFL            Safety Good-                                   Good        Comments: Upon arrival pt supine in bed, agreeable to OT, cleared by RN. Therapist facilitated ADL training/ functional transfer/mobility training with focus on safety, technique, precautions. Pt. c/o L shoulder pain s/p injection yesterday, limited AROM at end range but able to PROM to end range without pain, ice provided, pt. states has been helping & \"getting better. \"  Pt.  Instructed RE: safe transfers/mobility, ADLs, role of OT, treatment plan, recs. , prec. Pt. &  demo G understanding. At end of session seated in bedside chair, all needs met, all questions/concerns addressed, RN notified, all lines and tubes intact, call light within reach. Pt has made F+ progress towards set goals. Continue with current plan of care    Treatment Time In: 11:00            Treatment Time Out: 11:24                Treatment Charges: Mins Units   Ther Ex  12733     Manual Therapy 69944     Thera Activities 21147 16 1   ADL/Home Mgt 26699 08 1   Neuro Re-ed [id number]     Group Therapy      Orthotic manage/training  28120     Non-Billable Time     Total Timed Treatment 24 2     Jane Gandhi, OTR/L   License #  [id number]

## 2022-09-01 NOTE — PROGRESS NOTES
Department of Orthopedic Surgery  Resident Progress Note    POD 1. Patient seen and examined. Pain controlled. No new complaints. Denies chest pain, shortness of breath, dizziness/lightheadedness. VITALS:  BP (!) 141/72   Pulse 87   Temp 98.7 °F (37.1 °C) (Temporal)   Resp 18   Ht 5' 5\" (1.651 m)   Wt 220 lb (99.8 kg)   SpO2 95%   BMI 36.61 kg/m²     General: alert and in no acute distress    MUSCULOSKELETAL:   left lower extremity:  Dressing C/D/I  Compartments soft and compressible  +PF/DF/EHL  +2/4 DP & PT pulses, Brisk Cap refill, Toes warm and perfused  Distal sensation grossly intact to Peroneals, Sural, Saphenous, and tibial nrs    CBC:   Lab Results   Component Value Date/Time    WBC 4.0 09/01/2022 04:50 AM    HGB 8.3 09/01/2022 04:50 AM    HCT 27.3 09/01/2022 04:50 AM     09/01/2022 04:50 AM     PT/INR:    Lab Results   Component Value Date/Time    PROTIME 11.6 08/23/2022 08:02 AM    INR 1.0 08/23/2022 08:02 AM       ASSESSMENT  S/P 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 I have performed the key components of the history and physical examination and concur completely with the findings and plans as documented above. Doing well, planning for discharge today. DVT prophylaxis, pain control.   Toe-touch weightbearing only left lower extremity  Follow-up in office in 2 weeks    Electronically signed by   Bernie Espinosa DO  9/1/2022

## 2022-09-01 NOTE — CARE COORDINATION
9/1/22 Met with patient to discuss transition of care. Patient was was admitted for right distal femoral shaft malunion / periprosthetic fracture. A right femur revision was done. Patient states she lives home in a 2 story home with her . Patient voices she has a chair lift, a walker, transport chair, cane, rolator and 3 in 1 at home. Spoke with patient and  about Home health care support as patient is desiring to return home as soon as possible. Patient agreeable and would like a referral called to I. Referral placed to Mery Lew at [facility] with referral given and pending . PCP is Dr. Yoly Gibson and pharmacy is [facility] on [facility]. SW/CM to follow. 12:10pm [facility] is able to accept patient for home PT/OT.     Electronically signed by BRNANON Valdes on 9/1/2022 at 11:19 AM

## 2022-09-02 RX ORDER — ASPIRIN 325 MG
325 TABLET, DELAYED RELEASE (ENTERIC COATED) ORAL 2 TIMES DAILY
Qty: 60 TABLET | Refills: 2 | Status: SHIPPED | OUTPATIENT
Start: 2022-09-02

## 2022-09-03 LAB — CULTURE SURGICAL: NORMAL

## 2022-09-06 ENCOUNTER — TELEPHONE (OUTPATIENT)
Dept: ORTHOPEDIC SURGERY | Age: 67
End: 2022-09-06

## 2022-09-06 LAB — ANAEROBIC CULTURE: NORMAL

## 2022-09-06 NOTE — TELEPHONE ENCOUNTER
MVI called and states that seeing patient today that there is a lot drainage from the proximal incision site. Patient and  state that the drainage has not increased from discharge from hospital.     PT is questioning if wound care or nursing needs to see patient for dressing change/removal?     PT also reports that patient was exceeding the order for TTWB and was extensively educated by PT about what TTWB means. PT wants to verify any R knee restrictions? Passive ROM, resistance, or just focus on strength to be able to properly TTWB? Future Appointments   Date Time Provider Amado Gustafson   9/19/2022 12:30 PM SCHEDULE, SE ORTHO [facility]   9/20/2022 10:45 AM DO Indira Eric McAdoo[facility]   10/17/2022 12:30 PM SCHEDULE, SE ORTHO [facility]   11/17/2022  1:30 PM Carmen Bueno DO [facility]   11/28/2022  8:30 AM Sharon Navarro MD [facility]     Routed to providers for review.

## 2022-09-06 NOTE — TELEPHONE ENCOUNTER
Restrictions would be TTWB, A/PROM as well as strength to be able to maintain weightbearing status. Would verify at proximal incision site no s/s of infection.  Can use compressive dressings if still draining (more of a bolster dressing with compressive tape vs compression wrap based on location)  Electronically signed by Contreras Laureano PA-C on 9/6/2022 at 10:32 AM

## 2022-09-06 NOTE — TELEPHONE ENCOUNTER
Pt called in requesting to speak with a nurse, she has some questions regarding what she is suppose to be doing at home.  Balajiamparo Colon can be reached at [phone]

## 2022-09-07 NOTE — TELEPHONE ENCOUNTER
Called and spoke with patient regarding her dressing, appointment, and WB status. Advised patient to remain TTWB at all times until cleared by office to progress. Patient verbalized understanding.

## 2022-09-07 NOTE — TELEPHONE ENCOUNTER
Called left a voicemail for Roxanne De Jesus to discuss information in previous encounter. Await call back.

## 2022-09-08 ENCOUNTER — TELEPHONE (OUTPATIENT)
Dept: ORTHOPEDIC SURGERY | Age: 67
End: 2022-09-08

## 2022-09-08 NOTE — TELEPHONE ENCOUNTER
Patient called in asking clarification regarding ASA Order. Prescription for  mg, BID, was e-scribed on 9-2-2022. However, patient states when she was discharged from the hospital after surgery on 8-, she was given a prescription for ASA 81 mg, BID. Patient is asking for clarification on which dosage she needs to take.      DOS: 8- Revision ORIF of Right Femur, Left Shoulder Injection  NOV: 9-    Call back # [phone]

## 2022-09-08 NOTE — TELEPHONE ENCOUNTER
Call placed to patient at this time. Explained to patient that Aspirin 325mg BID is recommended by providers. Patient verbalized understanding.     Future Appointments   Date Time Provider Amado Gustafson   9/19/2022 12:30 PM SCHEDULE, SE ORTH[facility]   9/20/2022 10:45 AM DO Elena Pulido [facility]   10/17/2022 12:30 PM SCHEDULE, SE ORTHO [facility]   11/17/2022  1:30 PM Bernarda Simmons DO  Ortho [facility]   11/28/2022  8:30 AM Divya Thomas MD [facility]

## 2022-09-12 NOTE — PROGRESS NOTES
Physician Progress Note      Jayro Calloway  [facility] #:                  [id number]  :                       1955  ADMIT DATE:       2022 5:16 AM  DISCH DATE:        2022 5:03 PM  RESPONDING  PROVIDER #:        Ana Martínez DO          QUERY TEXT:    Patient underwent ORIF right femur malunion. The patient had a Hct/Hgb that   dropped from pre-op labs 36.4/11.1 to 27.3/8.3 during admit to 27.8/8.6 at   discharge. H&H was monitored during admission. After study, was this patient   suspected to have: The medical record reflects the following:  Risk Factors: post op  Clinical Indicators: Patient noted to have the following labs:  pre-op labs   36.4/11.1 to 27.3/8.3 during admit to 27.8/8.6 at discharge. Treatment: monitoring labs    Thank you,  Thelma Pastor, RN, BSN, CCDS, Clinical Documentation Improvement  Options provided:  -- Acute blood loss anemia  -- Chronic blood loss anemia  -- Acute on chronic blood loss anemia  -- Postoperative acute blood loss anemia  -- Precipitous drop in Hemoglobin and Hematocrit  -- Other - I will add my own diagnosis  -- Disagree - Not applicable / Not valid  -- Disagree - Clinically unable to determine / Unknown  -- Refer to Clinical Documentation Reviewer    PROVIDER RESPONSE TEXT:    This patient has acute blood loss anemia. Query created by: Ursula Boeck on 2022 2:08 PM      QUERY TEXT:    The op note for this admission indicates, \"Pre-op and post-op diagnosis: Right   distal femoral shaft malunion with periprosthetic fracture. \"  The body of the   procedure note indicates, \"We exposed the periprosthetic fracture site of the   hardware and the malunion site. Loreatha Press Loreatha Press We now started removing all the retained   broken hardware. \" After study, please confirm the site of the periprosthetic   fracture:     The medical record reflects the following:  Risk Factors: hx fall and surgery  Clinical Indicators: Patient was noted to have a fall in March 2022 and   underwent surgery on 3/14/2022 for right distal femur periprosthetic fracture. 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 \"  The body   of the procedure note indicates, \"We exposed the periprosthetic fracture site   of the hardware and the malunion site. Lucyann Push Lucyann Push We now started removing all the   retained broken hardware. \"  Treatment: Revision of ORIF    Thank you,  Keily Durbin, RN, BSN, CCDS, Clinical Documentation Improvement  Options provided:  -- Right femur malunion with periprosthetic hardware fracture, broken hardware  -- Right femur malunion with periprosthetic fracture of the distal femur  -- Other - I will add my own diagnosis  -- Disagree - Not applicable / Not valid  -- Disagree - Clinically unable to determine / Unknown  -- Refer to Clinical Documentation Reviewer    PROVIDER RESPONSE TEXT:    Right femur malunion with periprosthetic hardware fracture, broken hardware.     Query created by: Joel Ramsey on 9/9/2022 2:11 PM      Electronically signed by:  Gerardo Huertas DO 9/12/2022 9:13 AM

## 2022-09-16 DIAGNOSIS — M97.8XXA PERIPROSTHETIC FRACTURE OF SHAFT OF FEMUR: Primary | ICD-10-CM

## 2022-09-16 DIAGNOSIS — Z96.649 PERIPROSTHETIC FRACTURE OF SHAFT OF FEMUR: Primary | ICD-10-CM

## 2022-09-19 ENCOUNTER — OFFICE VISIT (OUTPATIENT)
Dept: ORTHOPEDIC SURGERY | Age: 67
End: 2022-09-19
Payer: MEDICARE

## 2022-09-19 ENCOUNTER — HOSPITAL ENCOUNTER (OUTPATIENT)
Dept: GENERAL RADIOLOGY | Age: 67
Discharge: HOME | End: 2022-09-21
Payer: MEDICARE

## 2022-09-19 VITALS — HEIGHT: 65 IN | BODY MASS INDEX: 36.61 KG/M2

## 2022-09-19 DIAGNOSIS — M97.8XXA PERIPROSTHETIC FRACTURE OF SHAFT OF FEMUR: ICD-10-CM

## 2022-09-19 DIAGNOSIS — M97.11XD PERIPROSTHETIC FRACTURE AROUND INTERNAL PROSTHETIC RIGHT KNEE JOINT, SUBSEQUENT ENCOUNTER: Primary | ICD-10-CM

## 2022-09-19 DIAGNOSIS — Z96.649 PERIPROSTHETIC FRACTURE OF SHAFT OF FEMUR: ICD-10-CM

## 2022-09-19 PROCEDURE — 73552 X-RAY EXAM OF FEMUR 2/>: CPT

## 2022-09-19 PROCEDURE — 99212 OFFICE O/P EST SF 10 MIN: CPT

## 2022-09-19 PROCEDURE — 99024 POSTOP FOLLOW-UP VISIT: CPT | Performed by: PHYSICIAN ASSISTANT

## 2022-09-19 RX ORDER — ERGOCALCIFEROL 1.25 MG/1
50000 CAPSULE ORAL WEEKLY
Qty: 12 CAPSULE | Refills: 1 | Status: SHIPPED | OUTPATIENT
Start: 2022-09-19

## 2022-09-19 RX ORDER — PHENOL 1.4 %
1 AEROSOL, SPRAY (ML) MUCOUS MEMBRANE DAILY
Qty: 30 TABLET | Refills: 3 | Status: SHIPPED | OUTPATIENT
Start: 2022-09-19

## 2022-09-19 NOTE — PATIENT INSTRUCTIONS
Continue toe-touch weightbearing right lower extremity. Continue aspirin twice daily for DVT prophylaxis. Patient is going to finish home PT then continue with a home exercise program.  May consider outpatient PT after her next visit. If Steri-Strips do not fall off right lower extremity incision in 7 days on their own, okay to remove. Calcium and vitamin D will be sent to your pharmacy. Work on good diabetic control    Continue high-protein diet to help with healing. Follow-up in 4 weeks for reevaluation and x-rays. Call if any questions or concerns.

## 2022-09-19 NOTE — PROGRESS NOTES
Chief Complaint   Patient presents with    Post-Op Check     Revision ORIF, Right femur 8/31/2022. Patient having minimal pain in the leg. She states that she has been compliant with WB status. Patient having in home PT but states that there is only one more visit of home PT at this time. OP:SURGEON: Dr. Alvarez Woodall DO  DATE OF PROCEDURE: 8-31-22  PROCEDURE: 1. Right distal femoral shaft repair malunion with corrective osteotomy compression technique   2. Open reduction internal fixation right femoral shaft with lateral compression plate and screws  3. Left shoulder CSI injection    POD: 3 weeks    Subjective:  Miguel Angel Panda is following up from the above surgery. She is TDWB on right lower extremity. She ambulates with assistive device, walker. Pain to extremity is none and is not taking prescribed pain medication. They denies numbness or tingling to the right lower extremity. Denies calf pain, chest pain, or shortness of breath. Patient continues to use DVT prophylaxis,  mg BID. Patient is participating in therapy, home health care . She is doing quite well after surgery. Denies any pain in the right leg. States that she is making progress in home therapy. She does not smoke but is diabetic. Surgery cultures were negative. Review of Systems -  All pertinent positives/negative in HPI     Objective:    General: Alert and oriented X 3, normocephalic atraumatic, external ears and eye normal, sclera clear, no acute distress, respirations easy and unlabored with no audible wheezes, skin warm and dry, speech and dress appropriate for noted age, affect euthymic.     Extremity:  Right Lower Extremity  Skin clean dry and intact, without signs of infection   Incision well-healed  mild edema noted diffusely in the right leg  Compartments supple throughout thigh and leg  Calf supple and not tender  negative Homans  Demonstrates active motion with hip flexion, knee flexion/extension and ankle dorsi/plantar flexion  Presents in a wheelchair  States sensation intact to touch in sural, deep peroneal, superficial peroneal, saphenous, posterior tibial  nerve distributions to foot/ankle. Palpable dorsalis pedis and posterior tibialis pulses, cap refill brisk in toes, foot warm/perfused. Ht 5' 5\" (1.651 m)   BMI 36.61 kg/m²     XR:   2 views right femur demonstrate ORIF right femoral shaft with lateral compression plate and screws with the hardware in stable position and alignment. No evidence of hardware loosening or failure. Assessment:   Diagnosis Orders   1. Periprosthetic fracture around internal prosthetic right knee joint, subsequent encounter          Plan:  X-rays reviewed and discussed. Continue toe-touch weightbearing right lower extremity. Continue aspirin twice daily for DVT prophylaxis. Patient is going to finish home PT then continue with a home exercise program.  May consider outpatient PT after her next visit. If Steri-Strips do not fall off right lower extremity incision in 7 days on their own, okay to remove. Calcium and vitamin D will be sent to your pharmacy. Continue high-protein diet to help with healing. Follow-up in 4 weeks for reevaluation and x-rays. Call if any questions or concerns. Electronically signed by STEPHANIE Graham on 9/19/2022 at 1:14 PM  Note: This report was completed using [email or web address] voiced recognition software. Every effort has been made to ensure accuracy; however, inadvertent computerized transcription errors may be present.

## 2022-09-20 ENCOUNTER — OFFICE VISIT (OUTPATIENT)
Dept: ORTHOPEDIC SURGERY | Age: 67
End: 2022-09-20
Payer: MEDICARE

## 2022-09-20 VITALS — HEIGHT: 64 IN | BODY MASS INDEX: 37.56 KG/M2 | TEMPERATURE: 98 F | WEIGHT: 220 LBS

## 2022-09-20 DIAGNOSIS — S46.912A STRAIN OF LEFT SHOULDER, INITIAL ENCOUNTER: ICD-10-CM

## 2022-09-20 DIAGNOSIS — M75.42 IMPINGEMENT SYNDROME OF LEFT SHOULDER: ICD-10-CM

## 2022-09-20 DIAGNOSIS — M75.102 NONTRAUMATIC TEAR OF LEFT ROTATOR CUFF, UNSPECIFIED TEAR EXTENT: Primary | ICD-10-CM

## 2022-09-20 DIAGNOSIS — Z71.82 EXERCISE COUNSELING: ICD-10-CM

## 2022-09-20 PROCEDURE — G8427 DOCREV CUR MEDS BY ELIG CLIN: HCPCS | Performed by: ORTHOPAEDIC SURGERY

## 2022-09-20 PROCEDURE — 1036F TOBACCO NON-USER: CPT | Performed by: ORTHOPAEDIC SURGERY

## 2022-09-20 PROCEDURE — G8417 CALC BMI ABV UP PARAM F/U: HCPCS | Performed by: ORTHOPAEDIC SURGERY

## 2022-09-20 PROCEDURE — 1111F DSCHRG MED/CURRENT MED MERGE: CPT | Performed by: ORTHOPAEDIC SURGERY

## 2022-09-20 PROCEDURE — G8400 PT W/DXA NO RESULTS DOC: HCPCS | Performed by: ORTHOPAEDIC SURGERY

## 2022-09-20 PROCEDURE — 1090F PRES/ABSN URINE INCON ASSESS: CPT | Performed by: ORTHOPAEDIC SURGERY

## 2022-09-20 PROCEDURE — 99214 OFFICE O/P EST MOD 30 MIN: CPT | Performed by: ORTHOPAEDIC SURGERY

## 2022-09-20 PROCEDURE — 3017F COLORECTAL CA SCREEN DOC REV: CPT | Performed by: ORTHOPAEDIC SURGERY

## 2022-09-20 PROCEDURE — 1123F ACP DISCUSS/DSCN MKR DOCD: CPT | Performed by: ORTHOPAEDIC SURGERY

## 2022-09-20 NOTE — PROGRESS NOTES
Chief Complaint   Patient presents with    Shoulder Pain     Lt shoulder pain for about a month ago she was here and used rollator to the office visit and since then she has had pain. HPI:    Patient is 79 y.o. female complaining of a new issue of left shoulder pain and weakness for several weeks after using walker in the office. She denies a specific traumatic injury to the left shoulder. Previous treatments include rest, ice, and anti-inflammatory medication and HEP without much relief. She denies any other orthopedic complaints covering from recent right femur ORIF revision. ROS:    Skin: (-) rash,(-) psoriasis,(-) eczema, (-)skin cancer. Neurologic: (-)numbness, (-)tingling, (-)headaches, (-) LOC. Cardiovascular: (-) Chest pain, (-) swelling in legs/feet, (-) SOB, (-) cramping in legs/feet with walking. 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    Overview:  [facility], 2001    Chronic nonalcoholic liver disease 15/44/2989    Diabetes ([facility] [address] 2019    Fracture of right lower extremity     Hyperlipidemia     Hypertension     Hypothyroidism     Malignant neoplasm of ovary ([facility] [address] 2001    Obesity     Positive FIT (fecal immunochemical test) 04/26/2018    Type II or unspecified type diabetes mellitus without mention of complication, not stated as uncontrolled     Unspecified vitamin D deficiency 01/14/2016     Past Surgical History:   Procedure Laterality Date    COLONOSCOPY      FEMUR FRACTURE SURGERY Right 8/31/2022    RIGHT FEMUR  REVISION OPEN REDUCTION INTERNAL FIXATION . LEFT SHOULDER STEROID INJECTION.  performed by Nereyda Jones DO at [address] [place] [address] (CERVIX STATUS UNKNOWN)      JOINT REPLACEMENT      total knees bilateral    KNEE SURGERY Bilateral 2006/ 2009    NE COLONOSCOPY FLX DX W/COLLJ SPEC WHEN PFRMD N/A 5/30/2018    COLONOSCOPY performed by Dunia Hanson MD at [facility]       Current Outpatient Medications:     calcium carbonate (CALCIUM 600) 600 MG TABS tablet, Take 1 tablet by mouth daily, Disp: 30 tablet, Rfl: 3    vitamin D (ERGOCALCIFEROL) 1.25 MG (90121 UT) CAPS capsule, Take 1 capsule by mouth once a week, Disp: 12 capsule, Rfl: 1    aspirin 325 MG EC tablet, Take 1 tablet by mouth in the morning and at bedtime, Disp: 60 tablet, Rfl: 2    Misc.  Devices MISC, STAIR LIFT Dx: Arthritis pain, femur fracture, gait instability, Disp: 1 each, Rfl: 0    traMADol (ULTRAM) 50 MG tablet, Take 50 mg by mouth every 6 hours as needed for Pain., Disp: , Rfl:     diclofenac sodium (VOLTAREN) 1 % GEL, Apply 2 g topically 4 times daily as needed for Pain, Disp: 350 g, Rfl: 3    metFORMIN (GLUCOPHAGE) 1000 MG tablet, Take 1 tablet by mouth 2 times daily (with meals), Disp: 180 tablet, Rfl: 1    atorvastatin (LIPITOR) 20 MG tablet, TAKE 1 TABLET DAILY, Disp: 90 tablet, Rfl: 3    levothyroxine (SYNTHROID) 200 MCG tablet, Take 1 tablet by mouth Daily, Disp: 132 tablet, Rfl: 1    levothyroxine (SYNTHROID) 150 MCG tablet, Take 1 tablet by mouth Twice a Week, Disp: 90 tablet, Rfl: 1    allopurinol (ZYLOPRIM) 100 MG tablet, take 1 tablet by mouth once daily, Disp: 90 tablet, Rfl: 1    losartan-hydroCHLOROthiazide (HYZAAR) 100-25 MG per tablet, take 1 tablet by mouth once daily, Disp: 90 tablet, Rfl: 1    clindamycin (CLEOCIN T) 1 % lotion, apply to face twice a day, Disp: , Rfl:     CALCIUM 600+D3 600-800 MG-UNIT TABS, TAKE 1 TABLET BY MOUTH TWICE DAILY WITH BREAKFAST AND SUPPER, Disp: , Rfl:     Ascorbic Acid (VITAMIN C) 1000 MG tablet, TAKE 1 TABLET BY MOUTH DAILY FOR 30 DAYS, Disp: , Rfl:     Blood Glucose Monitoring Suppl (ONE TOUCH ULTRA 2) w/Device KIT, Check blood sugar qam, Disp: 1 kit, Rfl: 0    ONE TOUCH ULTRASOFT LANCETS MISC, Check blood sugar every morning, Disp: 150 each, Rfl: 3    blood glucose test strips (ONETOUCH ULTRA) strip, Check blood sugar every morning, Disp: 150 each, Rfl: 3    ketoconazole (NIZORAL) 2 % cream, Apply topically daily. , Disp: 60 g, Rfl: 5    metroNIDAZOLE, TOPICAL, 0.75 % LOTN, Apply to face BID, Disp: 3 Bottle, Rfl: 3    triamcinolone (KENALOG) 0.1 % cream, Apply bid to affected areas prn, Disp: 1200 g, Rfl: 3    vitamin D (ERGOCALCIFEROL) 37791 units CAPS capsule, Take 1 capsule by mouth once a week, Disp: 12 capsule, Rfl: 1    Handicap Placard MISC, by Does not apply route Unable to ambulate more than 60 feet without difficulty Expires 10/31/2022, Disp: 1 each, Rfl: 0    Misc. Devices MISC, 1 each by Does not apply route once as needed (On shower chair), Disp: 1 each, Rfl: 0  Allergies   Allergen Reactions    Iodine      fish allergy.  IVP dye as well     Social History     Socioeconomic History    Marital status:      Spouse name: Not on file    Number of children: Not on file    Years of education: Not on file    Highest education level: Not on file   Occupational History    Not on file   Tobacco Use    Smoking status: Never    Smokeless tobacco: Never   Vaping Use    Vaping Use: Never used   Substance and Sexual Activity    Alcohol use: Yes     Comment: social    Drug use: No    Sexual activity: Not Currently   Other Topics Concern    Not on file   Social History Narrative    Not on file     Social Determinants of Health     Financial Resource Strain: Low Risk     Difficulty of Paying Living Expenses: Not hard at all   Food Insecurity: No Food Insecurity    Worried About Running Out of Food in the Last Year: Never true    Ran Out of Food in the Last Year: Never true   Transportation Needs: Not on file   Physical Activity: Insufficiently Active    Days of Exercise per Week: 2 days    Minutes of Exercise per Session: 40 min   Stress: Not on file   Social Connections: Not on file   Intimate Partner Violence: Not on file   Housing Stability: Not on file     Family History   Problem Relation Age of Onset    Early Death Mother            Physical Exam:    Temp 98 °F (36.7 °C)   Ht 5' 4\" (1.626 m)   Wt 220 lb (99.8 kg)   BMI 37.76 kg/m²     GENERAL: alert, appears stated age, cooperative, no acute distress    HEENT: Head is normocephalic, atraumatic. PERRLA. SKIN: Clean, dry, intact. There is not any cellulitis or cutaneous lesions noted in the upper extremities    PULMONARY: breathing is regular and unlabored, no acute distress    CV: The bilateral upper and lower extremities are warm and well-perfused with brisk capillary refill. 2+ pulses UE and LE bilateral.     PSYCHIATRY: Pleasant mood, appropriate behavior, follows commands    NEURO: Sensation is intact distally with light touch with no alteration. Motor exam of the upper extremities show elbow flexion and extension, wrist flexion and extension, and finger abduction grossly intact 5/5. Upper extremity reflexes are bilaterally symmetrical and within normal limits. LYMPH: No lymphedema present distally in upper or lower extremity. MUSCULOSKELETAL:  Shoulder Exam:  Examination of the Left shoulder shows: There is not a deformity. There is not erythema. There is not soft tissue swelling. Deltoid region is  tender to palpation. AC Joint is  tender to palpation. Clavicle is not tender to palpation. Bicipital Groove is  tender to palpation. Pectoralis  is not tender to palpation. Scapula/ trapezius is  tender to palpation.   Right:  ROM Full, Strength: Supraspinatus 5/5, Infraspinatus 5/5, Subscapularis 5/5  Left:  /120/60, Strength: Supraspinatus 4/5, Infraspinatus 5/5, Subscapularis 5/5  Right Shoulder:  Crepitus:  no   Tenderness:  none   Effusion:   none   Impingement: negative   Empty Can:  negative   Speed's:  negative      Apprehension:  negative   Cross Arm Sign:  negative   Bowman's:  negative       Neer's:  negative      Belly Press Test:  negative      Drop Arm Test:  negative     Left Shoulder:  Crepitus:  no   Tenderness:  mild   Effusion:   non   Impingement: positive   Empty Can:  positive   Speed's: positive   Apprehension:  not tested   Cross Arm Sign:  positive   Aurora's:  positive      Neer's:  positive      Belly Press Test:  negative   Drop Arm Test:  positive           Imaging:      XR SHOULDER LEFT (MIN 2 VIEWS)    Result Date: 8/22/2022  EXAMINATION: THREE XRAY VIEWS OF THE LEFT SHOULDER 8/22/2022 11:51 am COMPARISON: None. HISTORY: ORDERING SYSTEM PROVIDED HISTORY: Chronic left shoulder pain TECHNOLOGIST PROVIDED HISTORY: Reason for exam:->left shoulder pain FINDINGS: Osteoarthritis is mild to moderate at the [facility] joint and mild at the glenohumeral joint. A bony spur projecting inferiorly from the acromion may predispose to rotator cuff injury. Normal soft tissues. Osteoarthritis at the [facility] and glenohumeral joints as noted. Acromial morphology which may predispose to rotator cuff injury. FLUORO FOR SURGICAL PROCEDURES    Result Date: 8/31/2022  EXAMINATION: SPOT FLUOROSCOPIC IMAGES 8/31/2022 10:19 am TECHNIQUE: Fluoroscopy was provided by the radiology department for procedure. Radiologist was not present during examination. FLUOROSCOPY DOSE AND TYPE OR TIME AND EXPOSURES: Fluoroscopy time equals 74.4 seconds. Total dose equals 10.74 mGy COMPARISON: None HISTORY: ORDERING SYSTEM PROVIDED HISTORY: ORIF rt.femur TECHNOLOGIST PROVIDED HISTORY: Reason for exam:->ORIF rt.femur What reading provider will be dictating this exam?->CRC Intraprocedural imaging. FINDINGS: 9 spot images of the knee were obtained. Intraprocedural fluoroscopic spot images as above. See separate procedure report for more information. Haritha Lubin was seen today for shoulder pain. Diagnoses and all orders for this visit:    Nontraumatic tear of left rotator cuff, unspecified tear extent  -     MRI SHOULDER LEFT WO CONTRAST; Future    Impingement syndrome of left shoulder    Strain of left shoulder, initial encounter    Exercise counseling      Patient seen and examined. X-rays reviewed. Patient has exam and history consistent with rotator cuff pathology. MRI recommended for further evaluation and management of possible rotator cuff tear. Return to clinic after MRI      In a 15 minute assessment and discussion, patient was counseled on weight loss, healthy diet, and physical activity relating to this condition. She was educated with options in detail including nutrition, joining a health club/ weight loss program, and use of cardio equipment such as the Arc Trainer and the importance of use as well as range of motion and HEP exercises for weight loss and general health. Clover Felty,           25 minutes was spent with patient. 50% or greater was spent counseling the patient.

## 2022-10-14 DIAGNOSIS — S72.351K CLOSED DISP COMMINUTED FRACTURE OF SHAFT OF RIGHT FEMUR WITH NONUNION: Primary | ICD-10-CM

## 2022-10-17 ENCOUNTER — HOSPITAL ENCOUNTER (OUTPATIENT)
Dept: GENERAL RADIOLOGY | Age: 67
Discharge: HOME | End: 2022-10-19
Payer: MEDICARE

## 2022-10-17 ENCOUNTER — OFFICE VISIT (OUTPATIENT)
Dept: ORTHOPEDIC SURGERY | Age: 67
End: 2022-10-17
Payer: MEDICARE

## 2022-10-17 VITALS — WEIGHT: 220 LBS | HEIGHT: 64 IN | BODY MASS INDEX: 37.56 KG/M2

## 2022-10-17 DIAGNOSIS — S72.351K CLOSED DISP COMMINUTED FRACTURE OF SHAFT OF RIGHT FEMUR WITH NONUNION: Primary | ICD-10-CM

## 2022-10-17 DIAGNOSIS — S72.351K CLOSED DISP COMMINUTED FRACTURE OF SHAFT OF RIGHT FEMUR WITH NONUNION: ICD-10-CM

## 2022-10-17 PROCEDURE — 73552 X-RAY EXAM OF FEMUR 2/>: CPT

## 2022-10-17 PROCEDURE — 99024 POSTOP FOLLOW-UP VISIT: CPT | Performed by: PHYSICIAN ASSISTANT

## 2022-10-17 PROCEDURE — 99212 OFFICE O/P EST SF 10 MIN: CPT

## 2022-10-17 NOTE — PROGRESS NOTES
Chief Complaint   Patient presents with    Post-Op Check     Pt states no pn with femur, she is having some right knee pn        OP:SURGEON: Dr. Malia Lawler DO  DATE OF PROCEDURE: 8-31-22  PROCEDURE: 1. Right distal femoral shaft repair malunion with corrective osteotomy compression technique   2. Open reduction internal fixation right femoral shaft with lateral compression plate and screws  3. Left shoulder CSI injection    POD: 7 weeks    Subjective:  Adriane Dias is following up from the above surgery. She is TDWB on right lower extremity. She ambulates with assistive device, walker. Pain to extremity is none and is not taking prescribed pain medication. They denies numbness or tingling to the right lower extremity. Denies calf pain, chest pain, or shortness of breath. Patient continues to use DVT prophylaxis, ASA 81 mg BID. Patient is not participating in therapy at this time. She is doing well after surgery. States that she is putting some partial weightbearing on the right leg when getting around her house without increased pain. Also states that she has some intermittent discomfort in the right shin area more so when she is toe-touch weightbearing and she would like to be able to ambulate with a flatfoot. Review of Systems -  All pertinent positives/negative in HPI     Objective:    General: Alert and oriented X 3, normocephalic atraumatic, external ears and eye normal, sclera clear, no acute distress, respirations easy and unlabored with no audible wheezes, skin warm and dry, speech and dress appropriate for noted age, affect euthymic. Extremity:  Right Lower Extremity  Skin clean dry and intact, without signs of infection   Incision well-healed  no edema noted  Compartments supple throughout thigh and leg  Calf supple and not tender  negative Homans  Demonstrates active motion with hip flexion, knee flexion/extension and ankle dorsi/plantar flexion. Presents in a wheelchair.   States sensation intact to touch in sural, deep peroneal, superficial peroneal, saphenous, posterior tibial  nerve distributions to foot/ankle. Palpable dorsalis pedis and posterior tibialis pulses, cap refill brisk in toes, foot warm/perfused. Ht 5' 4\" (1.626 m)   Wt 220 lb (99.8 kg)   BMI 37.76 kg/m²     XR:   2 views right femur demonstrate ORIF right femoral shaft fracture with lateral compression plate and screws with hardware in stable position and alignment. No evidence of hardware loosening or failure. Good healing noted at the fracture site. Knee prosthesis with long tibial stem component appears in stable position and alignment. Assessment:   Diagnosis Orders   1. 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 Okay for toe-touch to 25% weightbearing right lower extremity. Continue home exercise program.    Recommend baby aspirin for DVT prophylaxis. Follow-up in 4 weeks for reevaluation, x-rays and possible progression of weightbearing status. Call if any questions or concerns. Electronically signed by STEPHANIE Vera on 10/17/2022 at 1:17 PM  Note: This report was completed using [employer] voiced recognition software. Every effort has been made to ensure accuracy; however, inadvertent computerized transcription errors may be present.

## 2022-10-17 NOTE — PATIENT INSTRUCTIONS
Okay for toe-touch to 25% weightbearing right lower extremity. Continue home exercise program.    Recommend baby aspirin for DVT prophylaxis. Follow-up in 4 weeks for reevaluation, x-rays and possible progression of weightbearing status. Call if any questions or concerns.

## 2022-10-21 DIAGNOSIS — E03.9 HYPOTHYROIDISM (ACQUIRED): ICD-10-CM

## 2022-10-24 RX ORDER — LEVOTHYROXINE SODIUM 0.2 MG/1
TABLET ORAL
Qty: 132 TABLET | Refills: 1 | Status: SHIPPED
Start: 2022-10-24 | End: 2022-11-28

## 2022-10-31 DIAGNOSIS — I10 ESSENTIAL HYPERTENSION: ICD-10-CM

## 2022-11-01 RX ORDER — LOSARTAN POTASSIUM AND HYDROCHLOROTHIAZIDE 25; 100 MG/1; MG/1
TABLET ORAL
Qty: 90 TABLET | Refills: 1 | Status: SHIPPED | OUTPATIENT
Start: 2022-11-01

## 2022-11-12 RX ORDER — ALLOPURINOL 100 MG/1
TABLET ORAL
Qty: 90 TABLET | Refills: 1 | Status: SHIPPED | OUTPATIENT
Start: 2022-11-12

## 2022-11-16 DIAGNOSIS — S72.351K CLOSED DISP COMMINUTED FRACTURE OF SHAFT OF RIGHT FEMUR WITH NONUNION: Primary | ICD-10-CM

## 2022-11-17 ENCOUNTER — HOSPITAL ENCOUNTER (OUTPATIENT)
Dept: GENERAL RADIOLOGY | Age: 67
Discharge: HOME | End: 2022-11-19
Payer: MEDICARE

## 2022-11-17 ENCOUNTER — OFFICE VISIT (OUTPATIENT)
Dept: ORTHOPEDIC SURGERY | Age: 67
End: 2022-11-17
Payer: MEDICARE

## 2022-11-17 VITALS — BODY MASS INDEX: 36.65 KG/M2 | WEIGHT: 220 LBS | HEIGHT: 65 IN

## 2022-11-17 DIAGNOSIS — S72.351K CLOSED DISP COMMINUTED FRACTURE OF SHAFT OF RIGHT FEMUR WITH NONUNION: ICD-10-CM

## 2022-11-17 DIAGNOSIS — S72.351K CLOSED DISP COMMINUTED FRACTURE OF SHAFT OF RIGHT FEMUR WITH NONUNION: Primary | ICD-10-CM

## 2022-11-17 PROCEDURE — 99024 POSTOP FOLLOW-UP VISIT: CPT | Performed by: ORTHOPAEDIC SURGERY

## 2022-11-17 PROCEDURE — 99212 OFFICE O/P EST SF 10 MIN: CPT

## 2022-11-17 PROCEDURE — 73552 X-RAY EXAM OF FEMUR 2/>: CPT

## 2022-11-17 NOTE — PROGRESS NOTES
Chief Complaint   Patient presents with    Follow-up     Periprosthetic fx of shaft of femur Rt. Doing well, ambulating with w/w most of the time. Is using stairs backwards to get in and out of house. OP:SURGEON: Dr. Neymar Araiza DO  DATE OF PROCEDURE: 8-31-22  PROCEDURE: 1. Right distal femoral shaft repair malunion with corrective osteotomy compression technique   2. Open reduction internal fixation right femoral shaft with lateral compression plate and screws  3. Left shoulder CSI injection      Subjective:  Yazan Jaimes is following up from the above surgery. She is partial weightbearing on the right lower extremity. Admits that she has put some full weight on it at times without any real pain or discomfort. Continues use her walker mostly for ambulation. Pain to extremity is none and is not taking prescribed pain medication. They denies numbness or tingling to the right lower extremity. Denies calf pain, chest pain, or shortness of breath. Patient has been using aspirin for DVT prophylaxis. Has been using calcium and vitamin D supplementation. States her leg feels much better than prior to surgery. She is eager to start full weightbearing permanently. Also states that she has some intermittent pains in the right shin which has been for some time now since she had a revision knee surgery, states she was told that there is always chance that the stem can fracture through the shin bone and she is fearful of this. Review of Systems -  All pertinent positives/negative in HPI     Objective:    General: Alert and oriented X 3, normocephalic atraumatic, external ears and eye normal, sclera clear, no acute distress, respirations easy and unlabored with no audible wheezes, skin warm and dry, speech and dress appropriate for noted age, affect euthymic.     Extremity:  Right Lower Extremity  Skin clean dry and intact, without signs of infection   Incision well-healed  no edema noted  Nontender to palpation over distal femur fracture site  Compartments supple throughout thigh and leg  Calf supple and not tender  negative Homans  No pain with hip logroll, good active knee ROM 3-115  Demonstrates active motion with hip flexion, knee flexion/extension and ankle dorsi/plantar flexion. States sensation intact to touch in sural, deep peroneal, superficial peroneal, saphenous, posterior tibial  nerve distributions to foot/ankle. Palpable dorsalis pedis and posterior tibialis pulses, cap refill brisk in toes, foot warm/perfused. Ht 5' 5\" (1.651 m)   Wt 220 lb (99.8 kg)   BMI 36.61 kg/m²     XR:   Right femur demonstrate ORIF right femoral shaft fracture with lateral compression plate and screws with hardware in stable position and alignment. No evidence of hardware loosening or failure. Does appear to be interval bridging callus formation at the main fracture line. Knee prosthesis with long tibial stem component appears in stable position and alignment, is some translational position of the stem relative to her native tibial canal axis which is unchanged. Assessment:   Diagnosis Orders   1. 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 Discussed she can start progressive weightbearing with assistive device  Discussed and recommended for PT however patient would like to try to regain her strength and motion on her own  Discussed if any increasing pain or discomfort she is to limit her weightbearing and contact us for more immediate follow-up  Would like to see her back in the office in 2-3 months for repeat evaluation with repeat x-rays  Discussed with patient she can wean off her aspirin for DVT prophylaxis at this point as she is ambulatory. Okay for toe-touch to 25% weightbearing right lower extremity. Repeat x-rays of femur and tibia next visit  Call if any questions or concerns.     Electronically signed by Marco Beltre DO on 11/17/2022    Note: This report was completed using [employer] voiced recognition software. Every effort has been made to ensure accuracy; however, inadvertent computerized transcription errors may be present.

## 2022-11-28 ENCOUNTER — OFFICE VISIT (OUTPATIENT)
Dept: FAMILY MEDICINE CLINIC | Age: 67
End: 2022-11-28
Payer: MEDICARE

## 2022-11-28 ENCOUNTER — PATIENT MESSAGE (OUTPATIENT)
Dept: FAMILY MEDICINE CLINIC | Age: 67
End: 2022-11-28

## 2022-11-28 VITALS
RESPIRATION RATE: 20 BRPM | DIASTOLIC BLOOD PRESSURE: 82 MMHG | HEART RATE: 69 BPM | HEIGHT: 65 IN | BODY MASS INDEX: 36.61 KG/M2 | OXYGEN SATURATION: 95 % | SYSTOLIC BLOOD PRESSURE: 128 MMHG

## 2022-11-28 DIAGNOSIS — E03.9 HYPOTHYROIDISM (ACQUIRED): ICD-10-CM

## 2022-11-28 DIAGNOSIS — E11.65 TYPE 2 DIABETES MELLITUS WITH HYPERGLYCEMIA, WITHOUT LONG-TERM CURRENT USE OF INSULIN (HCC): ICD-10-CM

## 2022-11-28 DIAGNOSIS — E11.65 TYPE 2 DIABETES MELLITUS WITH HYPERGLYCEMIA, WITHOUT LONG-TERM CURRENT USE OF INSULIN (HCC): Primary | ICD-10-CM

## 2022-11-28 DIAGNOSIS — R92.1 BREAST CALCIFICATIONS ON MAMMOGRAM: Primary | ICD-10-CM

## 2022-11-28 LAB
HBA1C MFR BLD: 6.2 %
HCT VFR BLD CALC: 36.1 % (ref 34–48)
HEMOGLOBIN: 11 G/DL (ref 11.5–15.5)
MCH RBC QN AUTO: 25.1 PG (ref 26–35)
MCHC RBC AUTO-ENTMCNC: 30.5 % (ref 32–34.5)
MCV RBC AUTO: 82.4 FL (ref 80–99.9)
PDW BLD-RTO: 20.8 FL (ref 11.5–15)
PLATELET # BLD: 165 E9/L (ref 130–450)
PMV BLD AUTO: ABNORMAL FL (ref 7–12)
RBC # BLD: 4.38 E12/L (ref 3.5–5.5)
TSH SERPL DL<=0.05 MIU/L-ACNC: 0.44 UIU/ML (ref 0.27–4.2)
WBC # BLD: 3.1 E9/L (ref 4.5–11.5)

## 2022-11-28 PROCEDURE — G8417 CALC BMI ABV UP PARAM F/U: HCPCS | Performed by: FAMILY MEDICINE

## 2022-11-28 PROCEDURE — G8400 PT W/DXA NO RESULTS DOC: HCPCS | Performed by: FAMILY MEDICINE

## 2022-11-28 PROCEDURE — 3074F SYST BP LT 130 MM HG: CPT | Performed by: FAMILY MEDICINE

## 2022-11-28 PROCEDURE — 2022F DILAT RTA XM EVC RTNOPTHY: CPT | Performed by: FAMILY MEDICINE

## 2022-11-28 PROCEDURE — 3017F COLORECTAL CA SCREEN DOC REV: CPT | Performed by: FAMILY MEDICINE

## 2022-11-28 PROCEDURE — 3044F HG A1C LEVEL LT 7.0%: CPT | Performed by: FAMILY MEDICINE

## 2022-11-28 PROCEDURE — G8427 DOCREV CUR MEDS BY ELIG CLIN: HCPCS | Performed by: FAMILY MEDICINE

## 2022-11-28 PROCEDURE — 83036 HEMOGLOBIN GLYCOSYLATED A1C: CPT | Performed by: FAMILY MEDICINE

## 2022-11-28 PROCEDURE — 99214 OFFICE O/P EST MOD 30 MIN: CPT | Performed by: FAMILY MEDICINE

## 2022-11-28 PROCEDURE — 1036F TOBACCO NON-USER: CPT | Performed by: FAMILY MEDICINE

## 2022-11-28 PROCEDURE — 1090F PRES/ABSN URINE INCON ASSESS: CPT | Performed by: FAMILY MEDICINE

## 2022-11-28 PROCEDURE — 1123F ACP DISCUSS/DSCN MKR DOCD: CPT | Performed by: FAMILY MEDICINE

## 2022-11-28 PROCEDURE — G8484 FLU IMMUNIZE NO ADMIN: HCPCS | Performed by: FAMILY MEDICINE

## 2022-11-28 PROCEDURE — 3078F DIAST BP <80 MM HG: CPT | Performed by: FAMILY MEDICINE

## 2022-11-28 RX ORDER — LEVOTHYROXINE SODIUM 0.15 MG/1
150 TABLET ORAL
Qty: 90 TABLET | Refills: 1 | Status: SHIPPED | OUTPATIENT
Start: 2022-11-28

## 2022-11-28 RX ORDER — LEVOTHYROXINE SODIUM 0.2 MG/1
TABLET ORAL
Qty: 132 TABLET | Refills: 1
Start: 2022-11-28

## 2022-11-28 RX ORDER — KETOCONAZOLE 20 MG/G
CREAM TOPICAL
Qty: 60 G | Refills: 5 | Status: SHIPPED | OUTPATIENT
Start: 2022-11-28

## 2022-11-28 RX ORDER — INDOMETHACIN 75 MG/1
75 CAPSULE, EXTENDED RELEASE ORAL 2 TIMES DAILY WITH MEALS
Qty: 180 CAPSULE | Refills: 3 | Status: SHIPPED | OUTPATIENT
Start: 2022-11-28

## 2022-11-28 RX ORDER — TRIAMCINOLONE ACETONIDE 1 MG/G
CREAM TOPICAL
Qty: 1200 G | Refills: 3 | Status: SHIPPED | OUTPATIENT
Start: 2022-11-28

## 2022-11-28 RX ORDER — METRONIDAZOLE 7.5 MG/G
LOTION TOPICAL
Qty: 3 EACH | Refills: 3 | Status: SHIPPED | OUTPATIENT
Start: 2022-11-28

## 2022-11-28 ASSESSMENT — ENCOUNTER SYMPTOMS
SHORTNESS OF BREATH: 0
VOMITING: 0
DIARRHEA: 0
NAUSEA: 0

## 2022-11-28 NOTE — PROGRESS NOTES
OFFICE PROGRESS NOTE      SUBJECTIVE:        Patient ID:   Sanford Dent is a 79 y.o. female who presents for   Chief Complaint   Patient presents with    Diabetes         HPI:   Patient is here to follow up on diabetes. Fasting blood sugars:not checking Midday blood sugars: not checking. Patient checks blood glucose 0 times per day. Patient is following diabetic diet. Patient is a nonsmoker. Last ophthalmology visit: 2021--will be scheduling soon. Patient is taking a daily statin. Recent lab results reviewed including CMP, CBC, TSH, and lipid panel which are remarkable for mild anemia. Last urine microalbumin: 5/2022. Patient concerned that thyroid dose may need adjusted since she has had recent weight gain. Prior to Admission medications    Medication Sig Start Date End Date Taking? Authorizing Provider   ketoconazole (NIZORAL) 2 % cream Apply topically daily.  11/28/22  Yes Lorene Lauren MD   metroNIDAZOLE, TOPICAL, 0.75 % LOTN Apply to face BID 11/28/22  Yes Lorene Lauren MD   triamcinolone (KENALOG) 0.1 % cream Apply bid to affected areas prn 11/28/22  Yes Lorene Lauren MD   indomethacin (INDOCIN SR) 75 MG extended release capsule Take 1 capsule by mouth 2 times daily (with meals) 11/28/22  Yes Lorene Lauren MD   levothyroxine (SYNTHROID) 150 MCG tablet Take 1 tablet by mouth Twice a Week 11/28/22  Yes Lorene Lauren MD   metFORMIN (GLUCOPHAGE) 1000 MG tablet Take 1 tablet by mouth 2 times daily (with meals) 11/28/22  Yes Lorene Lauren MD   levothyroxine (SYNTHROID) 200 MCG tablet take 2 tablets by mouth once daily [address] [address] then take 1 tablet once daily ON OTHER DAYS 11/28/22  Yes Lorene Lauren MD   allopurinol (ZYLOPRIM) 100 MG tablet take 1 tablet by mouth once daily 11/12/22  Yes Lorene Lauren MD   losartan-hydroCHLOROthiazide (HYZAAR) 100-25 MG per tablet take 1 tablet by mouth once daily 11/1/22  Yes Selina Collazo MD   Handicap Placard MISC by Does not apply route Unable to ambulate more than 60 feet without difficulty  Expires 12/31/2027 10/12/22  Yes Selina Collazo MD   calcium carbonate (CALCIUM 600) 600 MG TABS tablet Take 1 tablet by mouth daily 9/19/22  Yes STEPHANIE Devlin   vitamin D (ERGOCALCIFEROL) 1.25 MG (84710 UT) CAPS capsule Take 1 capsule by mouth once a week 9/19/22  Yes STEPHANIE Devlin   aspirin 325 MG EC tablet Take 1 tablet by mouth in the morning and at bedtime 9/2/22  Yes Khoa Keller PA-C   Misc. Devices MISC STAIR LIFT  Dx: Arthritis pain, femur fracture, gait instability 8/29/22  Yes Selina Collazo MD   traMADol (ULTRAM) 50 MG tablet Take 50 mg by mouth every 6 hours as needed for Pain. Yes Historical Provider, MD   diclofenac sodium (VOLTAREN) 1 % GEL Apply 2 g topically 4 times daily as needed for Pain 8/23/22  Yes Selina Collazo MD   atorvastatin (LIPITOR) 20 MG tablet TAKE 1 TABLET DAILY 5/26/22  Yes Selina Collazo MD   clindamycin (CLEOCIN T) 1 % lotion apply to face twice a day 1/7/22  Yes Historical Provider, MD   CALCIUM 600+D3 600-800 MG-UNIT TABS TAKE 1 TABLET BY MOUTH TWICE DAILY WITH BREAKFAST AND SUPPER 3/17/22  Yes Historical Provider, MD   Ascorbic Acid (VITAMIN C) 1000 MG tablet TAKE 1 TABLET BY MOUTH DAILY FOR 30 DAYS 3/16/22  Yes Historical Provider, MD   Blood Glucose Monitoring Suppl (ONE TOUCH ULTRA 2) w/Device KIT Check blood sugar qam 8/16/21  Yes Selina Collazo MD   ONE TOUCH ULTRASOFT LANCETS MISC Check blood sugar every morning 8/16/21  Yes Selina Collazo MD   blood glucose test strips [facility] ULTRA) strip Check blood sugar every morning 8/16/21  Yes Selina Collazo MD   vitamin D (ERGOCALCIFEROL) 25200 units CAPS capsule Take 1 capsule by mouth once a week 10/3/18  Yes Selina Collazo MD   Misc. Devices MISC 1 each by Does not apply route once as needed (On shower chair) 9/19/22 9/19/22  STEPHANIE Monk     Social History     Socioeconomic History    Marital status:      Spouse name: None    Number of children: None    Years of education: None    Highest education level: None   Tobacco Use    Smoking status: Never    Smokeless tobacco: Never   Vaping Use    Vaping Use: Never used   Substance and Sexual Activity    Alcohol use: Yes     Comment: social    Drug use: No    Sexual activity: Not Currently     Social Determinants of Health     Financial Resource Strain: Low Risk     Difficulty of Paying Living Expenses: Not hard at all   Food Insecurity: No Food Insecurity    Worried About Running Out of Food in the Last Year: Never true    Ran Out of Food in the Last Year: Never true   Physical Activity: Insufficiently Active    Days of Exercise per Week: 2 days    Minutes of Exercise per Session: 40 min       I have reviewed Gilma's allergies, medications, problem list, medical, social and family history and have updated as needed in the electronic medical record    Current Outpatient Medications   Medication Sig Dispense Refill    ketoconazole (NIZORAL) 2 % cream Apply topically daily.  60 g 5    metroNIDAZOLE, TOPICAL, 0.75 % LOTN Apply to face BID 3 each 3    triamcinolone (KENALOG) 0.1 % cream Apply bid to affected areas prn 1200 g 3    indomethacin (INDOCIN SR) 75 MG extended release capsule Take 1 capsule by mouth 2 times daily (with meals) 180 capsule 3    levothyroxine (SYNTHROID) 150 MCG tablet Take 1 tablet by mouth Twice a Week 90 tablet 1    metFORMIN (GLUCOPHAGE) 1000 MG tablet Take 1 tablet by mouth 2 times daily (with meals) 180 tablet 1    levothyroxine (SYNTHROID) 200 MCG tablet take 2 tablets by mouth once daily [address] then take 1 tablet once daily ON OTHER DAYS 132 tablet 1    allopurinol (ZYLOPRIM) 100 MG tablet take 1 tablet by mouth once daily 90 tablet 1 losartan-hydroCHLOROthiazide (HYZAAR) 100-25 MG per tablet take 1 tablet by mouth once daily 90 tablet 1    Handicap Placard MISC by Does not apply route Unable to ambulate more than 60 feet without difficulty  Expires 12/31/2027 1 each 0    calcium carbonate (CALCIUM 600) 600 MG TABS tablet Take 1 tablet by mouth daily 30 tablet 3    vitamin D (ERGOCALCIFEROL) 1.25 MG (76609 UT) CAPS capsule Take 1 capsule by mouth once a week 12 capsule 1    aspirin 325 MG EC tablet Take 1 tablet by mouth in the morning and at bedtime 60 tablet 2    Misc. Devices MISC STAIR LIFT  Dx: Arthritis pain, femur fracture, gait instability 1 each 0    traMADol (ULTRAM) 50 MG tablet Take 50 mg by mouth every 6 hours as needed for Pain. diclofenac sodium (VOLTAREN) 1 % GEL Apply 2 g topically 4 times daily as needed for Pain 350 g 3    atorvastatin (LIPITOR) 20 MG tablet TAKE 1 TABLET DAILY 90 tablet 3    clindamycin (CLEOCIN T) 1 % lotion apply to face twice a day      CALCIUM 600+D3 600-800 MG-UNIT TABS TAKE 1 TABLET BY MOUTH TWICE DAILY WITH BREAKFAST AND SUPPER      Ascorbic Acid (VITAMIN C) 1000 MG tablet TAKE 1 TABLET BY MOUTH DAILY FOR 30 DAYS      Blood Glucose Monitoring Suppl (ONE TOUCH ULTRA 2) w/Device KIT Check blood sugar qam 1 kit 0    ONE TOUCH ULTRASOFT LANCETS MISC Check blood sugar every morning 150 each 3    blood glucose test strips (ONETOUCH ULTRA) strip Check blood sugar every morning 150 each 3    vitamin D (ERGOCALCIFEROL) 53755 units CAPS capsule Take 1 capsule by mouth once a week 12 capsule 1    Misc. Devices MISC 1 each by Does not apply route once as needed (On shower chair) 1 each 0     No current facility-administered medications for this visit. Review Of Systems:    Review of Systems   Eyes:  Negative for visual disturbance. Respiratory:  Negative for shortness of breath. Cardiovascular:  Positive for leg swelling (mild recently). Negative for chest pain and palpitations. Gastrointestinal:  Negative for diarrhea, nausea and vomiting. Genitourinary:  Negative for difficulty urinating, dysuria and frequency. Skin:  Negative for rash. Psychiatric/Behavioral:  Negative for dysphoric mood. OBJECTIVE:     VS:  Wt Readings from Last 3 Encounters:   11/17/22 220 lb (99.8 kg)   10/17/22 220 lb (99.8 kg)   09/20/22 220 lb (99.8 kg)     Vitals:    11/28/22 0831   BP: 128/82   Pulse: 69   Resp: 20   SpO2: 95%       Physical Exam  Vitals reviewed. Constitutional:       General: She is not in acute distress. Appearance: She is well-developed. Comments: Sitting in wheelchair   Neck:      Vascular: No carotid bruit. Cardiovascular:      Rate and Rhythm: Normal rate and regular rhythm. Heart sounds: Normal heart sounds. No murmur heard. No gallop. Pulmonary:      Effort: Pulmonary effort is normal.      Breath sounds: Normal breath sounds. No wheezing or rales. Abdominal:      General: Bowel sounds are normal. There is no distension. Palpations: Abdomen is soft. Tenderness: There is no abdominal tenderness. Musculoskeletal:      Cervical back: Neck supple. Right lower leg: No edema. Left lower leg: No edema. Skin:     General: Skin is warm and dry. Neurological:      Mental Status: She is alert and oriented to person, place, and time. Results for orders placed or performed in visit on 11/28/22   POCT glycosylated hemoglobin (Hb A1C)   Result Value Ref Range    Hemoglobin A1C 6.2 %         Chaney Brisk was seen today for diabetes. Diagnoses and all orders for this visit:    Type 2 diabetes mellitus with hyperglycemia, without long-term current use of insulin (HCC)  -     POCT glycosylated hemoglobin (Hb A1C)  -     CBC; Future  -     metFORMIN (GLUCOPHAGE) 1000 MG tablet; Take 1 tablet by mouth 2 times daily (with meals)    Hypothyroidism (acquired)  -     levothyroxine (SYNTHROID) 150 MCG tablet;  Take 1 tablet by mouth Twice a Week  -     TSH; Future  -     levothyroxine (SYNTHROID) 200 MCG tablet; take 2 tablets by mouth once daily [address] [address] then take 1 tablet once daily ON OTHER DAYS        Phone/MyChart follow up if tests abnormal.    Return in about 6 months (around 5/28/2023) for diabetes. I have reviewed my findings and recommendations with Holly Walton.     Noramn Moon MD, M.D

## 2022-11-29 ENCOUNTER — PATIENT MESSAGE (OUTPATIENT)
Dept: FAMILY MEDICINE CLINIC | Age: 67
End: 2022-11-29

## 2022-11-29 DIAGNOSIS — J01.90 ACUTE BACTERIAL SINUSITIS: Primary | ICD-10-CM

## 2022-11-29 DIAGNOSIS — B96.89 ACUTE BACTERIAL SINUSITIS: Primary | ICD-10-CM

## 2022-11-29 NOTE — TELEPHONE ENCOUNTER
From: Tomas Bailon  To: Dr. Ally Victor  Sent: 11/29/2022 4:54 AM EST  Subject: Z Pack    Hi, I am now sick with the same thing Angeline Olmstead has. Cough and cold. It seems to be more in my chest. Can you please call in a Z Pack for me to [facility] on [address].  Thanks

## 2022-11-29 NOTE — TELEPHONE ENCOUNTER
From: Leroy Espinosa  To: Dr. Irina Cheema  Sent: 11/28/2022 6:56 PM EST  Subject: Mammogram     Hi, I totally forgot when I was there today, that I need to get another mammogram on my left breast sometime in Dec. Is there any way you can mail me an order for that? I usually have it done on [address].  Thanks

## 2022-11-30 RX ORDER — AZITHROMYCIN 250 MG/1
TABLET, FILM COATED ORAL
Qty: 6 TABLET | Refills: 0 | Status: SHIPPED | OUTPATIENT
Start: 2022-11-30

## 2023-01-12 ENCOUNTER — OFFICE VISIT (OUTPATIENT)
Dept: ORTHOPEDIC SURGERY | Age: 68
End: 2023-01-12
Payer: MEDICARE

## 2023-01-12 ENCOUNTER — HOSPITAL ENCOUNTER (OUTPATIENT)
Dept: GENERAL RADIOLOGY | Age: 68
Discharge: HOME | End: 2023-01-14
Payer: MEDICARE

## 2023-01-12 DIAGNOSIS — E66.01 SEVERE OBESITY (BMI 35.0-39.9) WITH COMORBIDITY (HCC): ICD-10-CM

## 2023-01-12 DIAGNOSIS — Z96.649 PERIPROSTHETIC FRACTURE OF SHAFT OF FEMUR: Primary | ICD-10-CM

## 2023-01-12 DIAGNOSIS — M21.70 LEG LENGTH DISCREPANCY: ICD-10-CM

## 2023-01-12 DIAGNOSIS — S72.351K CLOSED DISP COMMINUTED FRACTURE OF SHAFT OF RIGHT FEMUR WITH NONUNION: Primary | ICD-10-CM

## 2023-01-12 DIAGNOSIS — M25.562 LEFT KNEE PAIN, UNSPECIFIED CHRONICITY: ICD-10-CM

## 2023-01-12 DIAGNOSIS — S72.351K CLOSED DISP COMMINUTED FRACTURE OF SHAFT OF RIGHT FEMUR WITH NONUNION: ICD-10-CM

## 2023-01-12 DIAGNOSIS — M97.8XXA PERIPROSTHETIC FRACTURE OF SHAFT OF FEMUR: Primary | ICD-10-CM

## 2023-01-12 PROCEDURE — 73552 X-RAY EXAM OF FEMUR 2/>: CPT

## 2023-01-12 PROCEDURE — 99212 OFFICE O/P EST SF 10 MIN: CPT | Performed by: ORTHOPAEDIC SURGERY

## 2023-01-12 NOTE — PROGRESS NOTES
Yomi Meraz is a 79 y.o. female who presents for follow up of Right Femur ORIF     SURGEON: Dr. Honor Cheadle, DO  Date of Injury/Surgery:  8-  Date last seen in office:  11-    Symptoms: better  New complaints: Pt expressed having minor episodes of fatigue when ambulating for extended periods of time. Pt described having numbness constantly on the anterior aspect of the Right Thigh. Pt has concerns about altered gait when ambulating without a device. Weightbearing: right lower Partial weight bearing and Full weight bearing      Assistive device Walker - standard  Participating in therapy (location if yes)?  no    Refills Needed: None  Order/Referral Needed: N/A

## 2023-01-12 NOTE — PROGRESS NOTES
Chief Complaint   Patient presents with    Follow-up     Hx-Revision ORIF Right Femur. DOS: 6-        OP:SURGEON: Dr. Sharona Rosado DO  DATE OF PROCEDURE: 8-31-22  PROCEDURE: 1. Right distal femoral shaft repair malunion with corrective osteotomy compression technique   2. Open reduction internal fixation right femoral shaft with lateral compression plate and screws  3. Left shoulder CSI injection     POD: 4+ months    Subjective:  Anthony Ramos is following up from the above surgery. She is WBAT on right lower extremity. She ambulates with assistive device, walker. Pain to extremity is mild and is not taking prescribed pain medication. They denies numbness or tingling to the right lower extremity. Denies calf pain, chest pain, or shortness of breath. Patient has finished DVT prophylaxis. Patient is not participating in therapy at this time. She is doing well after surgery. Denies any pain in the right leg. She does complain of difficulty ambulating and states that she feels that she is waddling using the wheeled walker. Patient states that she had a left knee replacement around 15 years ago at [facility]. Over the past few months she has had increased pain in the left knee and she states she was told she may have a cyst.  At her next office visit here she requests evaluation for the left knee. Review of Systems -  All pertinent positives/negative in HPI     Objective:    General: Alert and oriented X 3, normocephalic atraumatic, external ears and eye normal, sclera clear, no acute distress, respirations easy and unlabored with no audible wheezes, skin warm and dry, speech and dress appropriate for noted age, affect euthymic.     Extremity:  Right Lower Extremity  Skin clean dry and intact, without signs of infection   Incision well-healed  no edema noted  Compartments supple throughout thigh and leg  Calf supple and not tender  negative Homans  Demonstrates active motion with hip flexion, knee flexion/extension and ankle dorsi/plantar flexion  Ambulates with a waddling type gait using the walker. She does have a leg length discrepancy with the right leg being approximately 1 cm shorter than the left  States sensation intact to touch in sural, deep peroneal, superficial peroneal, saphenous, posterior tibial  nerve distributions to foot/ankle. Palpable dorsalis pedis and posterior tibialis pulses, cap refill brisk in toes, foot warm/perfused. There were no vitals taken for this visit. XR:   2 views right femur demonstrate ORIF right femoral shaft fracture with lateral compression plate and screws with hardware in stable position and alignment. No evidence of hardware loosening or failure. Does appear to be interval bridging callus formation at the main fracture line. Knee prosthesis with long tibial stem component appears in stable position and alignment and some translation position of the stem relative to her native tibial canal axis which is unchanged. Assessment:   Diagnosis Orders   1. Periprosthetic fracture of shaft of femur [id number]. 8XXA, [id number] (ICD-10-CM)]        2. 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 Severe obesity (BMI 35.0-39. 9) with comorbidity ([address]        4. Leg length discrepancy          Plan:  X-rays reviewed and discussed. Continue weightbearing as tolerated right lower extremity. Patient will be set up with  orthotics for a right shoe lift for leg length discrepancy. We will plan for evaluation and x-rays of the left knee at her next office visit. Follow-up in 3 months for reevaluation and x-rays including left knee films. Call if any questions or concerns. Electronically signed by STEPHANIE Monk on 1/12/2023 at 11:18 AM  Note: This report was completed using [employer] voiced recognition software.   Every effort has been made to ensure accuracy; however, inadvertent computerized transcription errors may be present.

## 2023-01-12 NOTE — PROGRESS NOTES
Chief Complaint   Patient presents with    Follow-up     Hx-Revision ORIF Right Femur. DOS: 2022        OP:SURGEON: {OTSurgeons:99827}  DATE OF PROCEDURE: ***  PROCEDURE:***    POD: {TIME; 1 WEEK TO 3 MONTHS:0860578878}    Subjective:  Sanford Dent is following up from the above surgery. {He/she (caps):52031} is {Weight Bearin} on {Anatomy; location extremity:77434} extremity ***. {He/she (caps):21593} ambulates {With/no:51383} assistive device, ***. Pain to extremity is {description:346622} and {IS/IS OLX:06682} taking prescribed pain medication, {postoppainmed:76085}. They {denies/complains:56113} {Numbness:50328} to the {Anatomy; location extremity:73588} extremity. Denies calf pain, chest pain, or shortness of breath. Patient {Continues/Finished:62722} DVT prophylaxis, {PostOAC:82517}. Patient {IS/IS ZIY:05119} participating in therapy, {postop therapy\:95780}. ***     Review of Systems -  All pertinent positives/negative in HPI     Objective:    General: Alert and oriented X 3, normocephalic atraumatic, external ears and eye normal, sclera clear, no acute distress, respirations easy and unlabored with no audible wheezes, skin warm and dry, speech and dress appropriate for noted age, affect euthymic. Extremity:  {RIGHT LEFT BILATERAL CAPS YVEIT:94935} Lower Extremity  Skin clean dry and intact, {WITH/WITHOUT:02413} signs of infection ***  Incision {Exam; incision:57179} ***  {MILD:46912} edema noted  Compartments supple throughout thigh and leg  Calf supple and {TENDER NOT NFPXAC:52959}  {Desc; negative/positive:21999} Homans  Demonstrates active ***  ***  States sensation intact to touch in {LE Nerve:71011} nerve distributions to foot/ankle. Palpable dorsalis pedis and posterior tibialis pulses, cap refill brisk in toes, foot warm/perfused. ***    There were no vitals taken for this visit. XR:   ***    Assessment:  {No diagnosis found.  (Refresh or delete this SmartLink)}    Plan:  ***  Follow up in {Time; days to months:99279} for ***     Electronically signed by STEPHANIE Rios on 1/12/2023 at 10:59 AM  Note: This report was completed using [employer] voiced recognition software. Every effort has been made to ensure accuracy; however, inadvertent computerized transcription errors may be present.

## 2023-01-12 NOTE — PATIENT INSTRUCTIONS
Continue weightbearing as tolerated right lower extremity. Patient will be set up with  orthotics for a right shoe lift for leg length discrepancy. We will plan for evaluation and x-rays of the left knee at her next office visit. Follow-up in 3 months for reevaluation and x-rays including left knee films. Call if any questions or concerns.

## 2023-01-18 DIAGNOSIS — R92.1 BREAST CALCIFICATIONS ON MAMMOGRAM: ICD-10-CM

## 2023-01-18 DIAGNOSIS — R92.1 BREAST CALCIFICATIONS ON MAMMOGRAM: Primary | ICD-10-CM

## 2023-01-18 LAB — MAMMOGRAPHY, EXTERNAL: NORMAL

## 2023-02-13 DIAGNOSIS — E11.65 TYPE 2 DIABETES MELLITUS WITH HYPERGLYCEMIA, WITHOUT LONG-TERM CURRENT USE OF INSULIN (HCC): ICD-10-CM

## 2023-05-18 ENCOUNTER — HOSPITAL ENCOUNTER (OUTPATIENT)
Dept: GENERAL RADIOLOGY | Age: 68
Discharge: HOME | End: 2023-05-20
Payer: MEDICARE

## 2023-05-18 ENCOUNTER — OFFICE VISIT (OUTPATIENT)
Dept: ORTHOPEDIC SURGERY | Age: 68
End: 2023-05-18
Payer: MEDICARE

## 2023-05-18 DIAGNOSIS — S72.351K CLOSED DISP COMMINUTED FRACTURE OF SHAFT OF RIGHT FEMUR WITH NONUNION: ICD-10-CM

## 2023-05-18 DIAGNOSIS — M97.8XXA PERIPROSTHETIC FRACTURE OF SHAFT OF FEMUR: Primary | ICD-10-CM

## 2023-05-18 DIAGNOSIS — Z96.652 HISTORY OF TOTAL KNEE ARTHROPLASTY, LEFT: ICD-10-CM

## 2023-05-18 DIAGNOSIS — Z96.649 PERIPROSTHETIC FRACTURE OF SHAFT OF FEMUR: Primary | ICD-10-CM

## 2023-05-18 DIAGNOSIS — M25.562 LEFT KNEE PAIN, UNSPECIFIED CHRONICITY: ICD-10-CM

## 2023-05-18 PROCEDURE — G8417 CALC BMI ABV UP PARAM F/U: HCPCS | Performed by: PHYSICIAN ASSISTANT

## 2023-05-18 PROCEDURE — 1123F ACP DISCUSS/DSCN MKR DOCD: CPT | Performed by: PHYSICIAN ASSISTANT

## 2023-05-18 PROCEDURE — 1036F TOBACCO NON-USER: CPT | Performed by: PHYSICIAN ASSISTANT

## 2023-05-18 PROCEDURE — 1090F PRES/ABSN URINE INCON ASSESS: CPT | Performed by: PHYSICIAN ASSISTANT

## 2023-05-18 PROCEDURE — 73560 X-RAY EXAM OF KNEE 1 OR 2: CPT

## 2023-05-18 PROCEDURE — 99213 OFFICE O/P EST LOW 20 MIN: CPT | Performed by: PHYSICIAN ASSISTANT

## 2023-05-18 PROCEDURE — 3017F COLORECTAL CA SCREEN DOC REV: CPT | Performed by: PHYSICIAN ASSISTANT

## 2023-05-18 PROCEDURE — 73552 X-RAY EXAM OF FEMUR 2/>: CPT

## 2023-05-18 PROCEDURE — 99213 OFFICE O/P EST LOW 20 MIN: CPT

## 2023-05-18 PROCEDURE — G8400 PT W/DXA NO RESULTS DOC: HCPCS | Performed by: PHYSICIAN ASSISTANT

## 2023-05-18 PROCEDURE — G8427 DOCREV CUR MEDS BY ELIG CLIN: HCPCS | Performed by: PHYSICIAN ASSISTANT

## 2023-05-18 NOTE — PATIENT INSTRUCTIONS
Weightbearing as tolerated bilateral lower extremities. Continue using wheeled walker/cane as needed for support and balance. If worsening symptoms in the left knee, may consider bone scan to rule out aseptic hardware loosening. Follow-up in 4 weeks for reevaluation with Dr. Sarai Tolentino, no x-rays. Call if any questions or concerns.

## 2023-05-18 NOTE — PROGRESS NOTES
Chief Complaint   Patient presents with    Follow-up     ORIF right femur 8/31/22, leg is doing well  Left knee hx of replacement 2007, having pain with certain activity, needs to be cautious with knee        OP:SURGEON: Dr. Jose Richmond DO  DATE OF PROCEDURE: 8-31-22  PROCEDURE: 1. Right distal femoral shaft repair malunion with corrective osteotomy compression technique   2. Open reduction internal fixation right femoral shaft with lateral compression plate and screws  3. Left shoulder CSI injection     POD: 8.5 months    Subjective:  Abdullahi Daniel is following up from the above surgery. She is WBAT on right lower extremity. She ambulates with assistive device, walker. Pain to extremity is mild and is not taking prescribed pain medication. They denies numbness or tingling to the right lower extremity. Denies calf pain, chest pain, or shortness of breath. Patient has finished DVT prophylaxis. Patient is not participating in therapy at this time. Patient is doing well after surgery. She does ambulate with a walker and at times a cane for stability. She is interested in weaning off the cane but states that she feels still feels somewhat unsteady on her feet. She denies any pain in the right thigh or right knee areas. She does have some intermittent discomfort in the left knee medially. Patient states that she had left TKA done around 16 years ago by an outside orthopedic surgeon. Review of Systems -  All pertinent positives/negative in HPI     Objective:    General: Alert and oriented X 3, normocephalic atraumatic, external ears and eye normal, sclera clear, no acute distress, respirations easy and unlabored with no audible wheezes, skin warm and dry, speech and dress appropriate for noted age, affect euthymic.     Extremity:  Right Lower Extremity  Skin clean dry and intact, without signs of infection   Incision well-healed  no edema noted  Compartments supple throughout thigh and leg  Calf supple and

## 2023-05-19 ENCOUNTER — TELEPHONE (OUTPATIENT)
Dept: ORTHOPEDIC SURGERY | Age: 68
End: 2023-05-19

## 2023-05-19 NOTE — TELEPHONE ENCOUNTER
I called and spoke to the patient. Told her that I had spoken to Dr. Dillon Young about her symptomatology and radiographic findings. No acute orthopedic intervention recommended at this time. Patient states that her left TKA was done in 2007 by an outside surgeon whom she believes may have passed away. All questions answered to her satisfaction. She has already scheduled an upcoming appointment with Dr. Dillon Young.

## 2023-05-30 ENCOUNTER — OFFICE VISIT (OUTPATIENT)
Dept: FAMILY MEDICINE CLINIC | Age: 68
End: 2023-05-30
Payer: MEDICARE

## 2023-05-30 VITALS
WEIGHT: 235 LBS | OXYGEN SATURATION: 97 % | HEART RATE: 77 BPM | HEIGHT: 65 IN | DIASTOLIC BLOOD PRESSURE: 80 MMHG | BODY MASS INDEX: 39.15 KG/M2 | SYSTOLIC BLOOD PRESSURE: 138 MMHG | RESPIRATION RATE: 20 BRPM

## 2023-05-30 DIAGNOSIS — E03.9 HYPOTHYROIDISM (ACQUIRED): ICD-10-CM

## 2023-05-30 DIAGNOSIS — E11.9 TYPE 2 DIABETES MELLITUS WITHOUT COMPLICATION, WITHOUT LONG-TERM CURRENT USE OF INSULIN (HCC): Primary | ICD-10-CM

## 2023-05-30 LAB
HBA1C MFR BLD: 7 %
TSH SERPL-MCNC: 0.84 UIU/ML (ref 0.27–4.2)

## 2023-05-30 PROCEDURE — 1090F PRES/ABSN URINE INCON ASSESS: CPT | Performed by: FAMILY MEDICINE

## 2023-05-30 PROCEDURE — G8400 PT W/DXA NO RESULTS DOC: HCPCS | Performed by: FAMILY MEDICINE

## 2023-05-30 PROCEDURE — 3051F HG A1C>EQUAL 7.0%<8.0%: CPT | Performed by: FAMILY MEDICINE

## 2023-05-30 PROCEDURE — 1036F TOBACCO NON-USER: CPT | Performed by: FAMILY MEDICINE

## 2023-05-30 PROCEDURE — 83036 HEMOGLOBIN GLYCOSYLATED A1C: CPT | Performed by: FAMILY MEDICINE

## 2023-05-30 PROCEDURE — G8427 DOCREV CUR MEDS BY ELIG CLIN: HCPCS | Performed by: FAMILY MEDICINE

## 2023-05-30 PROCEDURE — 99214 OFFICE O/P EST MOD 30 MIN: CPT | Performed by: FAMILY MEDICINE

## 2023-05-30 PROCEDURE — G8417 CALC BMI ABV UP PARAM F/U: HCPCS | Performed by: FAMILY MEDICINE

## 2023-05-30 PROCEDURE — 1123F ACP DISCUSS/DSCN MKR DOCD: CPT | Performed by: FAMILY MEDICINE

## 2023-05-30 PROCEDURE — 3075F SYST BP GE 130 - 139MM HG: CPT | Performed by: FAMILY MEDICINE

## 2023-05-30 PROCEDURE — 3079F DIAST BP 80-89 MM HG: CPT | Performed by: FAMILY MEDICINE

## 2023-05-30 PROCEDURE — 2022F DILAT RTA XM EVC RTNOPTHY: CPT | Performed by: FAMILY MEDICINE

## 2023-05-30 PROCEDURE — 3017F COLORECTAL CA SCREEN DOC REV: CPT | Performed by: FAMILY MEDICINE

## 2023-05-30 RX ORDER — LEVOTHYROXINE SODIUM 0.2 MG/1
TABLET ORAL
Qty: 90 TABLET | Refills: 1 | Status: SHIPPED | OUTPATIENT
Start: 2023-05-30

## 2023-05-30 RX ORDER — LEVOTHYROXINE SODIUM 0.15 MG/1
150 TABLET ORAL
Qty: 90 TABLET | Refills: 1
Start: 2023-06-01

## 2023-05-30 SDOH — ECONOMIC STABILITY: INCOME INSECURITY: HOW HARD IS IT FOR YOU TO PAY FOR THE VERY BASICS LIKE FOOD, HOUSING, MEDICAL CARE, AND HEATING?: NOT HARD AT ALL

## 2023-05-30 SDOH — ECONOMIC STABILITY: FOOD INSECURITY: WITHIN THE PAST 12 MONTHS, THE FOOD YOU BOUGHT JUST DIDN'T LAST AND YOU DIDN'T HAVE MONEY TO GET MORE.: NEVER TRUE

## 2023-05-30 SDOH — ECONOMIC STABILITY: HOUSING INSECURITY
IN THE LAST 12 MONTHS, WAS THERE A TIME WHEN YOU DID NOT HAVE A STEADY PLACE TO SLEEP OR SLEPT IN A SHELTER (INCLUDING NOW)?: NO

## 2023-05-30 SDOH — ECONOMIC STABILITY: FOOD INSECURITY: WITHIN THE PAST 12 MONTHS, YOU WORRIED THAT YOUR FOOD WOULD RUN OUT BEFORE YOU GOT MONEY TO BUY MORE.: NEVER TRUE

## 2023-05-30 ASSESSMENT — PATIENT HEALTH QUESTIONNAIRE - PHQ9
1. LITTLE INTEREST OR PLEASURE IN DOING THINGS: 0
SUM OF ALL RESPONSES TO PHQ QUESTIONS 1-9: 0
SUM OF ALL RESPONSES TO PHQ9 QUESTIONS 1 & 2: 0
SUM OF ALL RESPONSES TO PHQ QUESTIONS 1-9: 0
2. FEELING DOWN, DEPRESSED OR HOPELESS: 0

## 2023-05-30 ASSESSMENT — ENCOUNTER SYMPTOMS
SHORTNESS OF BREATH: 0
DIARRHEA: 0
NAUSEA: 0
VOMITING: 0

## 2023-05-30 NOTE — PROGRESS NOTES
OFFICE PROGRESS NOTE      SUBJECTIVE:        Patient ID:   Jono Wylie is a 76 y.o. female whopresents for   Chief Complaint   Patient presents with    Diabetes           HPI:   Patient is here to follow up on diabetes. Fasting blood sugars:not checking Midday blood sugars: not checking. Patient checks blood glucose 0 times per day. Patient is following diabetic diet. Patient is a nonsmoker. Last ophthalmology visit: 4/2023. Patient is taking a daily statin. Last urine microalbumin: 5/2022. Patient recently increased her thyroid dose to taking an extra 150 mcg per week, normally only twice weekly, but has been taking 3 weekly for a while due to weight gain. Prior to Admission medications    Medication Sig Start Date End Date Taking? Authorizing Provider   levothyroxine (SYNTHROID) 200 MCG tablet Take 1 tablets by mouth once daily 5/30/23  Yes Tasha Ball MD   metFORMIN (GLUCOPHAGE) 1000 MG tablet Take 1 tablet by mouth 2 times daily (with meals) 5/30/23  Yes Tasha Ball MD   levothyroxine (SYNTHROID) 150 MCG tablet Take 1 tablet by mouth Twice a Week 6/1/23  Yes Tasha Ball MD   ketoconazole (NIZORAL) 2 % cream Apply topically daily.  11/28/22  Yes Tasha Ball MD   metroNIDAZOLE, TOPICAL, 0.75 % LOTN Apply to face BID 11/28/22  Yes Tasha Ball MD   triamcinolone (KENALOG) 0.1 % cream Apply bid to affected areas prn 11/28/22  Yes Tasha Ball MD   indomethacin (INDOCIN SR) 75 MG extended release capsule Take 1 capsule by mouth 2 times daily (with meals) 11/28/22  Yes Tasha Ball MD   allopurinol (ZYLOPRIM) 100 MG tablet take 1 tablet by mouth once daily 11/12/22  Yes Tasha Ball MD   losartan-hydroCHLOROthiazide [facility]) 100-25 MG per tablet take 1 tablet by mouth once daily 11/1/22  Yes Tasha Ball MD   Handicap Placard MISC by Does not apply route Unable to ambulate

## 2023-06-01 ENCOUNTER — PATIENT MESSAGE (OUTPATIENT)
Dept: FAMILY MEDICINE CLINIC | Age: 68
End: 2023-06-01

## 2023-06-01 DIAGNOSIS — Z12.31 ENCOUNTER FOR SCREENING MAMMOGRAM FOR MALIGNANT NEOPLASM OF BREAST: Primary | ICD-10-CM

## 2023-06-01 DIAGNOSIS — R92.0 ABNORMAL MAMMOGRAM WITH MICROCALCIFICATION: ICD-10-CM

## 2023-06-25 ENCOUNTER — PATIENT MESSAGE (OUTPATIENT)
Dept: FAMILY MEDICINE CLINIC | Age: 68
End: 2023-06-25

## 2023-06-25 DIAGNOSIS — Z12.31 ENCOUNTER FOR SCREENING MAMMOGRAM FOR MALIGNANT NEOPLASM OF BREAST: Primary | ICD-10-CM

## 2023-08-17 ENCOUNTER — PATIENT MESSAGE (OUTPATIENT)
Dept: FAMILY MEDICINE CLINIC | Age: 68
End: 2023-08-17

## 2023-08-17 DIAGNOSIS — E11.65 TYPE 2 DIABETES MELLITUS WITH HYPERGLYCEMIA, WITHOUT LONG-TERM CURRENT USE OF INSULIN (HCC): ICD-10-CM

## 2023-08-17 DIAGNOSIS — E03.9 HYPOTHYROIDISM (ACQUIRED): ICD-10-CM

## 2023-08-17 DIAGNOSIS — I10 ESSENTIAL HYPERTENSION: ICD-10-CM

## 2023-08-17 DIAGNOSIS — E11.9 TYPE 2 DIABETES MELLITUS WITHOUT COMPLICATION, WITHOUT LONG-TERM CURRENT USE OF INSULIN (HCC): ICD-10-CM

## 2023-08-21 RX ORDER — ATORVASTATIN CALCIUM 20 MG/1
TABLET, FILM COATED ORAL
Qty: 90 TABLET | Refills: 3 | Status: SHIPPED | OUTPATIENT
Start: 2023-08-21

## 2023-08-21 RX ORDER — LOSARTAN POTASSIUM AND HYDROCHLOROTHIAZIDE 25; 100 MG/1; MG/1
1 TABLET ORAL DAILY
Qty: 90 TABLET | Refills: 1 | Status: SHIPPED | OUTPATIENT
Start: 2023-08-21

## 2023-08-21 RX ORDER — LEVOTHYROXINE SODIUM 0.15 MG/1
150 TABLET ORAL
Qty: 150 TABLET | Refills: 1 | Status: SHIPPED | OUTPATIENT
Start: 2023-08-21

## 2023-08-21 RX ORDER — ALLOPURINOL 100 MG/1
100 TABLET ORAL DAILY
Qty: 90 TABLET | Refills: 1 | Status: SHIPPED | OUTPATIENT
Start: 2023-08-21

## 2023-08-28 DIAGNOSIS — E11.9 TYPE 2 DIABETES MELLITUS WITHOUT COMPLICATION, WITHOUT LONG-TERM CURRENT USE OF INSULIN (HCC): ICD-10-CM

## 2023-08-28 LAB
ALBUMIN SERPL-MCNC: 4.2 G/DL (ref 3.5–5.2)
ALP BLD-CCNC: 183 U/L (ref 35–104)
ALT SERPL-CCNC: 38 U/L (ref 0–32)
ANION GAP SERPL CALCULATED.3IONS-SCNC: 16 MMOL/L (ref 7–16)
AST SERPL-CCNC: 40 U/L (ref 0–31)
BILIRUB SERPL-MCNC: 0.4 MG/DL (ref 0–1.2)
BUN BLDV-MCNC: 21 MG/DL (ref 6–23)
CALCIUM SERPL-MCNC: 8.9 MG/DL (ref 8.6–10.2)
CHLORIDE BLD-SCNC: 98 MMOL/L (ref 98–107)
CHOLESTEROL: 146 MG/DL
CO2: 21 MMOL/L (ref 22–29)
CREAT SERPL-MCNC: 0.8 MG/DL (ref 0.5–1)
GFR SERPL CREATININE-BSD FRML MDRD: >60 ML/MIN/1.73M2
GLUCOSE BLD-MCNC: 148 MG/DL (ref 74–99)
HCT VFR BLD CALC: 40.3 % (ref 34–48)
HDLC SERPL-MCNC: 38 MG/DL
HEMOGLOBIN: 11.9 G/DL (ref 11.5–15.5)
LDL CHOLESTEROL: 74 MG/DL
MCH RBC QN AUTO: 25.3 PG (ref 26–35)
MCHC RBC AUTO-ENTMCNC: 29.5 G/DL (ref 32–34.5)
MCV RBC AUTO: 85.7 FL (ref 80–99.9)
PDW BLD-RTO: 16 % (ref 11.5–15)
PLATELET, FLUORESCENCE: 161 K/UL (ref 130–450)
PMV BLD AUTO: 13.7 FL (ref 7–12)
POTASSIUM SERPL-SCNC: 4.3 MMOL/L (ref 3.5–5)
RBC # BLD: 4.7 M/UL (ref 3.5–5.5)
SODIUM BLD-SCNC: 135 MMOL/L (ref 132–146)
TOTAL PROTEIN: 7.8 G/DL (ref 6.4–8.3)
TRIGL SERPL-MCNC: 168 MG/DL
VLDLC SERPL CALC-MCNC: 34 MG/DL
WBC # BLD: 2.8 K/UL (ref 4.5–11.5)

## 2023-08-29 LAB
CREATININE URINE: 41.4 MG/DL (ref 29–226)
MICROALBUMIN/CREAT 24H UR: <12 MG/L (ref 0–19)
MICROALBUMIN/CREAT UR-RTO: NORMAL MCG/MG CREAT (ref 0–30)

## 2023-08-31 ENCOUNTER — OFFICE VISIT (OUTPATIENT)
Dept: FAMILY MEDICINE CLINIC | Age: 68
End: 2023-08-31
Payer: MEDICARE

## 2023-08-31 VITALS
WEIGHT: 235 LBS | SYSTOLIC BLOOD PRESSURE: 138 MMHG | HEART RATE: 20 BPM | DIASTOLIC BLOOD PRESSURE: 78 MMHG | BODY MASS INDEX: 39.15 KG/M2 | OXYGEN SATURATION: 99 % | HEIGHT: 65 IN | RESPIRATION RATE: 20 BRPM

## 2023-08-31 DIAGNOSIS — E11.9 TYPE 2 DIABETES MELLITUS WITHOUT COMPLICATION, WITHOUT LONG-TERM CURRENT USE OF INSULIN (HCC): Primary | ICD-10-CM

## 2023-08-31 DIAGNOSIS — E03.9 HYPOTHYROIDISM (ACQUIRED): ICD-10-CM

## 2023-08-31 LAB — HBA1C MFR BLD: 7.2 %

## 2023-08-31 PROCEDURE — G8400 PT W/DXA NO RESULTS DOC: HCPCS | Performed by: FAMILY MEDICINE

## 2023-08-31 PROCEDURE — G8417 CALC BMI ABV UP PARAM F/U: HCPCS | Performed by: FAMILY MEDICINE

## 2023-08-31 PROCEDURE — 1123F ACP DISCUSS/DSCN MKR DOCD: CPT | Performed by: FAMILY MEDICINE

## 2023-08-31 PROCEDURE — 3017F COLORECTAL CA SCREEN DOC REV: CPT | Performed by: FAMILY MEDICINE

## 2023-08-31 PROCEDURE — 2022F DILAT RTA XM EVC RTNOPTHY: CPT | Performed by: FAMILY MEDICINE

## 2023-08-31 PROCEDURE — 3075F SYST BP GE 130 - 139MM HG: CPT | Performed by: FAMILY MEDICINE

## 2023-08-31 PROCEDURE — 1036F TOBACCO NON-USER: CPT | Performed by: FAMILY MEDICINE

## 2023-08-31 PROCEDURE — 83036 HEMOGLOBIN GLYCOSYLATED A1C: CPT | Performed by: FAMILY MEDICINE

## 2023-08-31 PROCEDURE — 99214 OFFICE O/P EST MOD 30 MIN: CPT | Performed by: FAMILY MEDICINE

## 2023-08-31 PROCEDURE — 3051F HG A1C>EQUAL 7.0%<8.0%: CPT | Performed by: FAMILY MEDICINE

## 2023-08-31 PROCEDURE — G8427 DOCREV CUR MEDS BY ELIG CLIN: HCPCS | Performed by: FAMILY MEDICINE

## 2023-08-31 PROCEDURE — 3078F DIAST BP <80 MM HG: CPT | Performed by: FAMILY MEDICINE

## 2023-08-31 PROCEDURE — 1090F PRES/ABSN URINE INCON ASSESS: CPT | Performed by: FAMILY MEDICINE

## 2023-08-31 ASSESSMENT — ENCOUNTER SYMPTOMS
CONSTIPATION: 1
NAUSEA: 0
SHORTNESS OF BREATH: 0
DIARRHEA: 0
VOMITING: 0
ABDOMINAL DISTENTION: 1

## 2023-08-31 NOTE — PROGRESS NOTES
(with meals) 11/28/22  Yes Newton Floyd MD   Handicap Placard MISC by Does not apply route Unable to ambulate more than 60 feet without difficulty  Expires 12/31/2027 10/12/22  Yes MD Juanita Aliciac. Devices MISC STAIR LIFT  Dx: Arthritis pain, femur fracture, gait instability 8/29/22  Yes Newton Floyd MD   diclofenac sodium (VOLTAREN) 1 % GEL Apply 2 g topically 4 times daily as needed for Pain 8/23/22  Yes Newton Floyd MD   clindamycin (CLEOCIN T) 1 % lotion apply to face twice a day 1/7/22  Yes Opal Taylor MD   Blood Glucose Monitoring Suppl (ONE TOUCH ULTRA 2) w/Device KIT Check blood sugar qam 8/16/21  Yes Newton Floyd MD   ONE TOUCH ULTRASOFT LANCETS MISC Check blood sugar every morning 8/16/21  Yes Newton Floyd MD   blood glucose test strips [facility] ULTRA) strip Check blood sugar every morning 8/16/21  Yes MD Juan M Alicia.  Devices MISC 1 each by Does not apply route once as needed (On shower chair) 9/19/22 9/19/22  STEPHANIE iJmenez     Social History     Socioeconomic History    Marital status:      Spouse name: None    Number of children: None    Years of education: None    Highest education level: None   Tobacco Use    Smoking status: Never    Smokeless tobacco: Never   Vaping Use    Vaping Use: Never used   Substance and Sexual Activity    Alcohol use: Yes     Comment: social    Drug use: No    Sexual activity: Not Currently     Social Determinants of Health     Financial Resource Strain: Low Risk     Difficulty of Paying Living Expenses: Not hard at all   Food Insecurity: No Food Insecurity    Worried About Running Out of Food in the Last Year: Never true    Ran Out of Food in the Last Year: Never true   Transportation Needs: Unknown    Lack of Transportation (Non-Medical): No   Housing Stability: Unknown    Unstable Housing in the Last Year: No       I have reviewed

## 2023-09-14 ENCOUNTER — TELEPHONE (OUTPATIENT)
Age: 68
End: 2023-09-14

## 2023-09-15 ENCOUNTER — OFFICE VISIT (OUTPATIENT)
Age: 68
End: 2023-09-15
Payer: MEDICARE

## 2023-09-15 VITALS
BODY MASS INDEX: 39.99 KG/M2 | HEART RATE: 102 BPM | DIASTOLIC BLOOD PRESSURE: 96 MMHG | SYSTOLIC BLOOD PRESSURE: 167 MMHG | WEIGHT: 240 LBS | HEIGHT: 65 IN

## 2023-09-15 DIAGNOSIS — Z12.72 SCREENING FOR CANCER OF VAGINA AFTER REMOVAL OF CERVIX WITH REMAINING UTERUS: ICD-10-CM

## 2023-09-15 DIAGNOSIS — Z90.712 SCREENING FOR CANCER OF VAGINA AFTER REMOVAL OF CERVIX WITH REMAINING UTERUS: ICD-10-CM

## 2023-09-15 DIAGNOSIS — N90.89 VULVAR MASS: Primary | ICD-10-CM

## 2023-09-15 LAB
BILIRUBIN URINE: NEGATIVE
COLOR: YELLOW
COMMENT: NORMAL
GLUCOSE URINE: NEGATIVE MG/DL
KETONES, URINE: NEGATIVE MG/DL
LEUKOCYTE ESTERASE, URINE: NEGATIVE
NITRITE, URINE: NEGATIVE
PH UA: 7 (ref 5–9)
PROTEIN UA: NEGATIVE MG/DL
SPECIFIC GRAVITY UA: 1.01 (ref 1–1.03)
TURBIDITY: CLEAR
URINE HGB: NEGATIVE
UROBILINOGEN, URINE: 0.2 EU/DL (ref 0–1)

## 2023-09-15 PROCEDURE — 1123F ACP DISCUSS/DSCN MKR DOCD: CPT | Performed by: LEGAL MEDICINE

## 2023-09-15 PROCEDURE — 1090F PRES/ABSN URINE INCON ASSESS: CPT | Performed by: LEGAL MEDICINE

## 2023-09-15 PROCEDURE — 3017F COLORECTAL CA SCREEN DOC REV: CPT | Performed by: LEGAL MEDICINE

## 2023-09-15 PROCEDURE — 3080F DIAST BP >= 90 MM HG: CPT | Performed by: LEGAL MEDICINE

## 2023-09-15 PROCEDURE — G8400 PT W/DXA NO RESULTS DOC: HCPCS | Performed by: LEGAL MEDICINE

## 2023-09-15 PROCEDURE — 99204 OFFICE O/P NEW MOD 45 MIN: CPT | Performed by: LEGAL MEDICINE

## 2023-09-15 PROCEDURE — G8417 CALC BMI ABV UP PARAM F/U: HCPCS | Performed by: LEGAL MEDICINE

## 2023-09-15 PROCEDURE — 3077F SYST BP >= 140 MM HG: CPT | Performed by: LEGAL MEDICINE

## 2023-09-15 PROCEDURE — 1036F TOBACCO NON-USER: CPT | Performed by: LEGAL MEDICINE

## 2023-09-15 PROCEDURE — G8427 DOCREV CUR MEDS BY ELIG CLIN: HCPCS | Performed by: LEGAL MEDICINE

## 2023-09-15 NOTE — PROGRESS NOTES
Darwin Byrne     Patient presents for she felt pressure a week ago and put her finger in her vagina and thought she felt a lump. She does not regularly insert her finger into her vagina. No problems with her bowel or urinary function. Pelvic pain. No hematuria. No dysuria. Is not sexually active. Has not had a pelvic exam for over 5 years. No night sweats. Was diagnosed with stage IIc ovarian cancer in 2001. Underwent a [place]/BSO at [facility] clinic at that time. Underwent 6 months of chemotherapy thereafter. Then, had a CAT scan every year for 15 years and there was no evidence of disease. Gyn Onc then released her for regular follow-up. Had a normal colonoscopy. I encouraged her to show me where she had felt the lump by pointing to the direct area where she had palpated, but she declined. 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    Overview:  CELIO Paredes [address] Rx, 2001    Chronic nonalcoholic liver disease 20/35/0231    Diabetes ([address]) 2019    Fracture of right lower extremity     Hyperlipidemia     Hypertension     Hypothyroidism     Malignant neoplasm of ovary ([address]) 2001    Obesity     Positive FIT (fecal immunochemical test) 04/26/2018    Type II or unspecified type diabetes mellitus without mention of complication, not stated as uncontrolled     Unspecified vitamin D deficiency 01/14/2016        Past Surgical History:   Procedure Laterality Date    COLONOSCOPY      FEMUR FRACTURE SURGERY Right 8/31/2022    RIGHT FEMUR  REVISION OPEN REDUCTION INTERNAL FIXATION . LEFT SHOULDER STEROID INJECTION.  performed by Keenan Ramirez DO at [address] [place] [address] (CERVIX STATUS UNKNOWN)      JOINT REPLACEMENT      total knees bilateral    KNEE SURGERY Bilateral 2006/ 2009    CO COLONOSCOPY FLX DX W/COLLJ SPEC WHEN PFRMD N/A 5/30/2018    COLONOSCOPY performed by Chayito Sanchez MD at [address] [place] [address]        Family History   Problem Relation Age of Onset

## 2023-09-18 ENCOUNTER — TELEPHONE (OUTPATIENT)
Age: 68
End: 2023-09-18

## 2023-09-18 NOTE — TELEPHONE ENCOUNTER
Patient has some concerns of the vulvar mass. She did not realize that the area you were concerned with was an actual mass. She does have an appt for the biopsy 9/28/23 for a vulvar colposcopy.

## 2023-09-21 LAB — GYNECOLOGY CYTOLOGY REPORT: NORMAL

## 2023-09-28 ENCOUNTER — OFFICE VISIT (OUTPATIENT)
Age: 68
End: 2023-09-28

## 2023-09-28 VITALS — DIASTOLIC BLOOD PRESSURE: 91 MMHG | SYSTOLIC BLOOD PRESSURE: 163 MMHG | HEART RATE: 80 BPM

## 2023-09-28 DIAGNOSIS — N90.89 VULVAR LESION: Primary | ICD-10-CM

## 2023-09-28 NOTE — PATIENT INSTRUCTIONS
Apply pressure with the pad for 6 hours to the area after the procedure. Clean the area daily with soap and water and after each bowel movement. Pour warm water on the area after urination and pat it dry. Nothing per vagina for 2 weeks. No swimming for 2 weeks. I will contact you with the report of the biopsy within 2 weeks. Once I have contacted you, we can cancel the 4-week follow-up appointment if you are not having any problems.

## 2023-09-28 NOTE — PROGRESS NOTES
Vulvar Biopsy     Oscar Torres 76 y.o. presents today for vulvar biopsy. She has had vulvar lesion for  unknown duration of time    Vitals:  BP (!) 175/91   Pulse 86       The risks, benefits and anticipated outcomes of the procedure,the risks and benefits of the alternatives to the procedure and the roles and tasks of the personnel to be involved were discussed with the patient and the patient does consent to the procedure and does agree to proceed. The equipment necessary to proceed is available and in working order. Verbal consent obtained. Timeout was done    Vulva:        Procedure note:  COLPOSCOPY AFTER APPLICATION OF 5% ACETIC ACID WAS PERFORMED. 5 x 2 cm irregular melanocytic lesion noted on the left labia majora. Sample biopsy was obtained at the 2 o'clock position. After area sterilly cleansed with peroxide, infiltrated at base with 1% Lidocaine. BIOPSIES WERE OBTAINED FROM _________ 2:00 _____________________ [facility]. HEMOSTASIS WAS ACHIEVED WITH    ________ tamponade _______________________. Hemostatic at the end of the procedure. Patient tolerated procedure well. Specimen was labeled and sent to Pathology for analysis. Proper care of area was reviewed. Assessment:        Diagnosis Orders   1. Vulvar lesion [N90.89]              Plan:       Tissue sent to pathology. Patient willfollow up for results in 4 weeks. Proper care of the area was reviewed with the patient.     Kinjal Manzo MD  9/28/23, 9:51 AM EDT

## 2023-10-03 ENCOUNTER — TELEPHONE (OUTPATIENT)
Age: 68
End: 2023-10-03

## 2023-10-03 LAB — SURGICAL PATHOLOGY REPORT: NORMAL

## 2023-11-07 ENCOUNTER — OFFICE VISIT (OUTPATIENT)
Dept: FAMILY MEDICINE CLINIC | Age: 68
End: 2023-11-07
Payer: MEDICARE

## 2023-11-07 VITALS
SYSTOLIC BLOOD PRESSURE: 136 MMHG | HEIGHT: 65 IN | RESPIRATION RATE: 20 BRPM | HEART RATE: 83 BPM | OXYGEN SATURATION: 97 % | DIASTOLIC BLOOD PRESSURE: 94 MMHG | BODY MASS INDEX: 39.94 KG/M2

## 2023-11-07 DIAGNOSIS — E11.9 TYPE 2 DIABETES MELLITUS WITHOUT COMPLICATION, WITHOUT LONG-TERM CURRENT USE OF INSULIN (HCC): ICD-10-CM

## 2023-11-07 DIAGNOSIS — D70.1 CHEMOTHERAPY-INDUCED NEUTROPENIA (HCC): Primary | ICD-10-CM

## 2023-11-07 DIAGNOSIS — T45.1X5A CHEMOTHERAPY-INDUCED NEUTROPENIA (HCC): Primary | ICD-10-CM

## 2023-11-07 PROBLEM — S52.352J: Status: RESOLVED | Noted: 2022-08-31 | Resolved: 2023-11-07

## 2023-11-07 PROBLEM — S72.351K CLOSED DISP COMMINUTED FRACTURE OF SHAFT OF RIGHT FEMUR WITH NONUNION: Status: RESOLVED | Noted: 2022-08-31 | Resolved: 2023-11-07

## 2023-11-07 PROCEDURE — G8427 DOCREV CUR MEDS BY ELIG CLIN: HCPCS | Performed by: FAMILY MEDICINE

## 2023-11-07 PROCEDURE — G8484 FLU IMMUNIZE NO ADMIN: HCPCS | Performed by: FAMILY MEDICINE

## 2023-11-07 PROCEDURE — 99214 OFFICE O/P EST MOD 30 MIN: CPT | Performed by: FAMILY MEDICINE

## 2023-11-07 PROCEDURE — 1123F ACP DISCUSS/DSCN MKR DOCD: CPT | Performed by: FAMILY MEDICINE

## 2023-11-07 PROCEDURE — 2022F DILAT RTA XM EVC RTNOPTHY: CPT | Performed by: FAMILY MEDICINE

## 2023-11-07 PROCEDURE — 1036F TOBACCO NON-USER: CPT | Performed by: FAMILY MEDICINE

## 2023-11-07 PROCEDURE — 3051F HG A1C>EQUAL 7.0%<8.0%: CPT | Performed by: FAMILY MEDICINE

## 2023-11-07 PROCEDURE — 1090F PRES/ABSN URINE INCON ASSESS: CPT | Performed by: FAMILY MEDICINE

## 2023-11-07 PROCEDURE — 3017F COLORECTAL CA SCREEN DOC REV: CPT | Performed by: FAMILY MEDICINE

## 2023-11-07 PROCEDURE — G8400 PT W/DXA NO RESULTS DOC: HCPCS | Performed by: FAMILY MEDICINE

## 2023-11-07 PROCEDURE — 3080F DIAST BP >= 90 MM HG: CPT | Performed by: FAMILY MEDICINE

## 2023-11-07 PROCEDURE — G8417 CALC BMI ABV UP PARAM F/U: HCPCS | Performed by: FAMILY MEDICINE

## 2023-11-07 PROCEDURE — 3075F SYST BP GE 130 - 139MM HG: CPT | Performed by: FAMILY MEDICINE

## 2023-11-07 RX ORDER — ATORVASTATIN CALCIUM 20 MG/1
TABLET, FILM COATED ORAL
Qty: 90 TABLET | Refills: 3
Start: 2023-11-07

## 2023-11-07 ASSESSMENT — ENCOUNTER SYMPTOMS
VOMITING: 0
SHORTNESS OF BREATH: 0
DIARRHEA: 0
NAUSEA: 0

## 2023-11-07 NOTE — PROGRESS NOTES
[address] [facility], [address]   [address] [facility]   Livan Wheeler MD     Patient: Zena Ulloa Birth: 1955  Visit Date: 11/7/23    Nay Valle is a 76y.o. year old female here today for   Chief Complaint   Patient presents with    Abnormal Lab     Wants you to go over labs  that the Dr did not explain she is worried       HPI  Patient had recent labs per hematology. Concerned about low white count. Patient had lab work in October but hematologist has not told her what she needs to do about it. Patient doing well on current regimen for DM. Recent lab results reviewed, including CMP, CBC, TSH, and lipid panel which are remarkable for hyperlipidemia and leukopenia. Review of Systems   Constitutional:  Negative for chills and fever. Eyes:  Positive for visual disturbance (blurry vision in mornings). Respiratory:  Negative for shortness of breath. Cardiovascular:  Negative for chest pain, palpitations and leg swelling. Gastrointestinal:  Negative for diarrhea, nausea and vomiting. Genitourinary:  Negative for difficulty urinating, dysuria and frequency. Skin:  Positive for rash (rosacea). Psychiatric/Behavioral:  Negative for dysphoric mood. Past medical, surgical, social and/or family historyreviewed, updated as needed, and are non-contributory (unless otherwise stated). Medications, allergies, and problem list also reviewed and updated as needed in patient's record.      Current Outpatient Medications   Medication Sig Dispense Refill    metFORMIN (GLUCOPHAGE) 1000 MG tablet Take 1 tablet by mouth 2 times daily (with meals) 180 tablet 1    atorvastatin (LIPITOR) 20 MG tablet TAKE 1 TABLET DAILY 90 tablet 3    levothyroxine (SYNTHROID) 150 MCG tablet Take 1 tablet by mouth three times a week 150 tablet 1    allopurinol (ZYLOPRIM) 100 MG tablet Take 1 tablet by mouth daily 90 tablet 1

## 2023-12-12 ENCOUNTER — OFFICE VISIT (OUTPATIENT)
Dept: FAMILY MEDICINE CLINIC | Age: 68
End: 2023-12-12
Payer: MEDICARE

## 2023-12-12 VITALS
HEIGHT: 65 IN | HEART RATE: 83 BPM | BODY MASS INDEX: 39.94 KG/M2 | DIASTOLIC BLOOD PRESSURE: 78 MMHG | OXYGEN SATURATION: 99 % | RESPIRATION RATE: 20 BRPM | SYSTOLIC BLOOD PRESSURE: 136 MMHG

## 2023-12-12 DIAGNOSIS — M25.512 CHRONIC LEFT SHOULDER PAIN: ICD-10-CM

## 2023-12-12 DIAGNOSIS — E03.9 HYPOTHYROIDISM (ACQUIRED): ICD-10-CM

## 2023-12-12 DIAGNOSIS — G89.29 CHRONIC LEFT SHOULDER PAIN: ICD-10-CM

## 2023-12-12 DIAGNOSIS — E11.9 TYPE 2 DIABETES MELLITUS WITHOUT COMPLICATION, WITHOUT LONG-TERM CURRENT USE OF INSULIN (HCC): Primary | ICD-10-CM

## 2023-12-12 DIAGNOSIS — I10 ESSENTIAL HYPERTENSION: ICD-10-CM

## 2023-12-12 LAB — HBA1C MFR BLD: 7.4 %

## 2023-12-12 PROCEDURE — 99214 OFFICE O/P EST MOD 30 MIN: CPT | Performed by: FAMILY MEDICINE

## 2023-12-12 PROCEDURE — 3075F SYST BP GE 130 - 139MM HG: CPT | Performed by: FAMILY MEDICINE

## 2023-12-12 PROCEDURE — G8417 CALC BMI ABV UP PARAM F/U: HCPCS | Performed by: FAMILY MEDICINE

## 2023-12-12 PROCEDURE — 3051F HG A1C>EQUAL 7.0%<8.0%: CPT | Performed by: FAMILY MEDICINE

## 2023-12-12 PROCEDURE — G8400 PT W/DXA NO RESULTS DOC: HCPCS | Performed by: FAMILY MEDICINE

## 2023-12-12 PROCEDURE — G8427 DOCREV CUR MEDS BY ELIG CLIN: HCPCS | Performed by: FAMILY MEDICINE

## 2023-12-12 PROCEDURE — 3017F COLORECTAL CA SCREEN DOC REV: CPT | Performed by: FAMILY MEDICINE

## 2023-12-12 PROCEDURE — 1090F PRES/ABSN URINE INCON ASSESS: CPT | Performed by: FAMILY MEDICINE

## 2023-12-12 PROCEDURE — 3078F DIAST BP <80 MM HG: CPT | Performed by: FAMILY MEDICINE

## 2023-12-12 PROCEDURE — 1036F TOBACCO NON-USER: CPT | Performed by: FAMILY MEDICINE

## 2023-12-12 PROCEDURE — 83036 HEMOGLOBIN GLYCOSYLATED A1C: CPT | Performed by: FAMILY MEDICINE

## 2023-12-12 PROCEDURE — G8484 FLU IMMUNIZE NO ADMIN: HCPCS | Performed by: FAMILY MEDICINE

## 2023-12-12 PROCEDURE — 2022F DILAT RTA XM EVC RTNOPTHY: CPT | Performed by: FAMILY MEDICINE

## 2023-12-12 PROCEDURE — 1123F ACP DISCUSS/DSCN MKR DOCD: CPT | Performed by: FAMILY MEDICINE

## 2023-12-12 RX ORDER — KETOCONAZOLE 20 MG/G
CREAM TOPICAL
Qty: 60 G | Refills: 5 | Status: SHIPPED | OUTPATIENT
Start: 2023-12-12

## 2023-12-12 RX ORDER — TRAMADOL HYDROCHLORIDE 50 MG/1
50 TABLET ORAL EVERY 6 HOURS PRN
Qty: 28 TABLET | Refills: 0 | Status: SHIPPED | OUTPATIENT
Start: 2023-12-12 | End: 2023-12-19

## 2023-12-12 RX ORDER — LEVOTHYROXINE SODIUM 0.15 MG/1
150 TABLET ORAL
Qty: 150 TABLET | Refills: 1 | Status: SHIPPED | OUTPATIENT
Start: 2023-12-13

## 2023-12-12 RX ORDER — METRONIDAZOLE 7.5 MG/G
LOTION TOPICAL
Qty: 3 EACH | Refills: 3 | Status: SHIPPED | OUTPATIENT
Start: 2023-12-12

## 2023-12-12 RX ORDER — TRIAMCINOLONE ACETONIDE 1 MG/G
CREAM TOPICAL
Qty: 1200 G | Refills: 3 | Status: SHIPPED | OUTPATIENT
Start: 2023-12-12

## 2023-12-12 RX ORDER — ALLOPURINOL 100 MG/1
100 TABLET ORAL DAILY
Qty: 90 TABLET | Refills: 1 | Status: SHIPPED | OUTPATIENT
Start: 2023-12-12

## 2023-12-12 RX ORDER — INDOMETHACIN 75 MG/1
75 CAPSULE, EXTENDED RELEASE ORAL 2 TIMES DAILY WITH MEALS
Qty: 180 CAPSULE | Refills: 3 | Status: SHIPPED | OUTPATIENT
Start: 2023-12-12

## 2023-12-12 RX ORDER — LOSARTAN POTASSIUM AND HYDROCHLOROTHIAZIDE 25; 100 MG/1; MG/1
1 TABLET ORAL DAILY
Qty: 90 TABLET | Refills: 1 | Status: SHIPPED | OUTPATIENT
Start: 2023-12-12

## 2023-12-12 RX ORDER — LEVOTHYROXINE SODIUM 0.2 MG/1
TABLET ORAL
Qty: 90 TABLET | Refills: 1 | Status: SHIPPED | OUTPATIENT
Start: 2023-12-12

## 2023-12-12 RX ORDER — ATORVASTATIN CALCIUM 20 MG/1
TABLET, FILM COATED ORAL
Qty: 90 TABLET | Refills: 3 | Status: SHIPPED | OUTPATIENT
Start: 2023-12-12

## 2023-12-12 ASSESSMENT — ENCOUNTER SYMPTOMS
ABDOMINAL DISTENTION: 1
SHORTNESS OF BREATH: 0
DIARRHEA: 0
NAUSEA: 0
VOMITING: 0

## 2023-12-23 DIAGNOSIS — E03.9 HYPOTHYROIDISM (ACQUIRED): ICD-10-CM

## 2023-12-26 ENCOUNTER — TELEPHONE (OUTPATIENT)
Dept: ORTHOPEDIC SURGERY | Age: 68
End: 2023-12-26

## 2023-12-26 RX ORDER — LEVOTHYROXINE SODIUM 0.2 MG/1
TABLET ORAL
Qty: 90 TABLET | Refills: 1 | Status: SHIPPED | OUTPATIENT
Start: 2023-12-26

## 2023-12-26 NOTE — TELEPHONE ENCOUNTER
Future Appointments   Date Time Provider [address]   1/18/2024  1:45 PM SCHEDULE, [facility]   3/25/2024  8:30 AM Vickie Ortiz MD [facility]

## 2023-12-26 NOTE — TELEPHONE ENCOUNTER
Patient called the office requesting an appointment to be seen because she is currently experiencing pain and swelling in her left knee. Patient stated she was under care by physician for her left knee. 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 Mervat Cannon DO  DATE OF PROCEDURE: 8-31-22  PROCEDURE: 1. Right distal femoral shaft repair malunion with corrective osteotomy compression technique   2. Open reduction internal fixation right femoral shaft with lateral compression plate and screws  3. Left shoulder CSI injection      Per telephone encounter per Mukesh Sood on 5/19/23   I called and spoke to the patient. Told her that I had spoken to Dr. Everett Gtz about her symptomatology and radiographic findings. No acute orthopedic intervention recommended at this time. Patient states that her left TKA was done in 2007 by an outside surgeon whom she believes may have passed away. All questions answered to her satisfaction. She has already scheduled an upcoming appointment with Dr. Everett Gtz. Routed to providers for further recommendation and review.

## 2024-01-02 ENCOUNTER — OFFICE VISIT (OUTPATIENT)
Dept: FAMILY MEDICINE CLINIC | Age: 69
End: 2024-01-02
Payer: MEDICARE

## 2024-01-02 VITALS
RESPIRATION RATE: 17 BRPM | TEMPERATURE: 97.6 F | SYSTOLIC BLOOD PRESSURE: 140 MMHG | HEART RATE: 85 BPM | OXYGEN SATURATION: 96 % | WEIGHT: 240 LBS | DIASTOLIC BLOOD PRESSURE: 80 MMHG | BODY MASS INDEX: 39.94 KG/M2

## 2024-01-02 DIAGNOSIS — M25.562 ACUTE PAIN OF LEFT KNEE: Primary | ICD-10-CM

## 2024-01-02 PROCEDURE — 1036F TOBACCO NON-USER: CPT

## 2024-01-02 PROCEDURE — 1090F PRES/ABSN URINE INCON ASSESS: CPT

## 2024-01-02 PROCEDURE — 3017F COLORECTAL CA SCREEN DOC REV: CPT

## 2024-01-02 PROCEDURE — 1123F ACP DISCUSS/DSCN MKR DOCD: CPT

## 2024-01-02 PROCEDURE — G8484 FLU IMMUNIZE NO ADMIN: HCPCS

## 2024-01-02 PROCEDURE — 3079F DIAST BP 80-89 MM HG: CPT

## 2024-01-02 PROCEDURE — 99213 OFFICE O/P EST LOW 20 MIN: CPT

## 2024-01-02 PROCEDURE — 3077F SYST BP >= 140 MM HG: CPT

## 2024-01-02 PROCEDURE — G8400 PT W/DXA NO RESULTS DOC: HCPCS

## 2024-01-02 PROCEDURE — G8427 DOCREV CUR MEDS BY ELIG CLIN: HCPCS

## 2024-01-02 PROCEDURE — G8417 CALC BMI ABV UP PARAM F/U: HCPCS

## 2024-01-02 ASSESSMENT — PATIENT HEALTH QUESTIONNAIRE - PHQ9
2. FEELING DOWN, DEPRESSED OR HOPELESS: 0
1. LITTLE INTEREST OR PLEASURE IN DOING THINGS: 0
SUM OF ALL RESPONSES TO PHQ QUESTIONS 1-9: 0
SUM OF ALL RESPONSES TO PHQ9 QUESTIONS 1 & 2: 0
SUM OF ALL RESPONSES TO PHQ QUESTIONS 1-9: 0

## 2024-01-02 NOTE — PROGRESS NOTES
Chief Complaint       Knee Pain (Left knee pain started a week ago, wanted an x-ray to see what is going on)      History of Present Illness   Source of history provided by:  patient.      Gilma Graham is a 68 y.o. old female presenting to the walk in clinic for evaluation of left knee pain for the past 7 days. States the pain is located over the right patella aspect and does not radiate. States the pain is progressive. Denies any known injury to the knee. Denies associated swelling. Reports moderate pain with ROM. Pain is not exacerbated by ambulation. Denies any weakness, paresthesias, calf pain/edema, foot/ankle pain, hip pain, back pain, abrasions, fever, chills, rash, or any other symptoms. There has has been a history or prior knee problems. Reports history of previous knee surgery.  Has been taking nothing OTC without relief.    ROS    Unless otherwise stated in this report or unable to obtain because of the patient's clinical or mental status as evidenced by the medical record, this patients's positive and negative responses for Review of Systems, constitutional, psych, eyes, ENT, cardiovascular, respiratory, gastrointestinal, neurological, genitourinary, musculoskeletal, integument systems and systems related to the presenting problem are either stated in the preceding or were not pertinent or were negative for the symptoms and/or complaints related to the medical problem.lizz.    Physical Exam         VS:  BP (!) 140/80   Pulse 85   Temp 97.6 °F (36.4 °C) (Infrared)   Resp 17   Wt 108.9 kg (240 lb)   SpO2 96%   BMI 39.94 kg/m²    Oxygen Saturation Interpretation: Normal.    Constitutional:  Alert, development consistent with age.  Lungs: CTAB without wheezing, rales, or rhonchi.  CV: RRR without pathologic murmurs or gallops.  Knee:  Inspection: left knee without obvious deformity.              Tenderness:  Mild TTP over patella.               Swelling/Effusion: No edema noted over patella.

## 2024-01-03 ENCOUNTER — PATIENT MESSAGE (OUTPATIENT)
Dept: FAMILY MEDICINE CLINIC | Age: 69
End: 2024-01-03

## 2024-01-03 DIAGNOSIS — M25.512 CHRONIC LEFT SHOULDER PAIN: ICD-10-CM

## 2024-01-03 DIAGNOSIS — G89.29 CHRONIC LEFT SHOULDER PAIN: ICD-10-CM

## 2024-01-04 NOTE — TELEPHONE ENCOUNTER
From: Gilma Graham  To: Dr. Vonnie Ortiz  Sent: 1/3/2024 9:33 PM EST  Subject: Left knee    Hi, I went to the walk in clinic there on Tues. with a lot of pain in my left knee. It was replaced in 2007 and has been giving me problems for a year. I got some X-rays and was told I would be called with the results. No one has called me as of yet. I was wondering if you could read the radiologist report and explain to me what is going on with my knee. I do have an appointment with Dr. Bey, but not till the 18th. I was wondering what I can do till then to alleviate the pain. Thanks

## 2024-01-05 ENCOUNTER — TELEPHONE (OUTPATIENT)
Dept: FAMILY MEDICINE CLINIC | Age: 69
End: 2024-01-05

## 2024-01-05 NOTE — TELEPHONE ENCOUNTER
I spoke with Patient about results , explained that I had no missed calls or messages, feel call was being routed else where. Also explained I was off on Tues and I apologized may=out the delay of the call. She stated she understood and was ok.

## 2024-01-08 RX ORDER — TRAMADOL HYDROCHLORIDE 50 MG/1
50 TABLET ORAL EVERY 6 HOURS PRN
Qty: 28 TABLET | Refills: 0 | Status: SHIPPED | OUTPATIENT
Start: 2024-01-08 | End: 2024-01-15

## 2024-01-18 ENCOUNTER — OFFICE VISIT (OUTPATIENT)
Dept: ORTHOPEDIC SURGERY | Age: 69
End: 2024-01-18
Payer: MEDICARE

## 2024-01-18 ENCOUNTER — HOSPITAL ENCOUNTER (OUTPATIENT)
Dept: GENERAL RADIOLOGY | Age: 69
Discharge: HOME | End: 2024-01-20
Payer: MEDICARE

## 2024-01-18 DIAGNOSIS — S72.351K CLOSED DISP COMMINUTED FRACTURE OF SHAFT OF RIGHT FEMUR WITH NONUNION: Primary | ICD-10-CM

## 2024-01-18 DIAGNOSIS — S72.351K CLOSED DISP COMMINUTED FRACTURE OF SHAFT OF RIGHT FEMUR WITH NONUNION: ICD-10-CM

## 2024-01-18 DIAGNOSIS — M97.8XXA PERIPROSTHETIC FRACTURE OF SHAFT OF FEMUR: ICD-10-CM

## 2024-01-18 DIAGNOSIS — Z96.652 HISTORY OF TOTAL KNEE ARTHROPLASTY, LEFT: Primary | ICD-10-CM

## 2024-01-18 DIAGNOSIS — Z96.649 PERIPROSTHETIC FRACTURE OF SHAFT OF FEMUR: ICD-10-CM

## 2024-01-18 PROCEDURE — 99212 OFFICE O/P EST SF 10 MIN: CPT | Performed by: ORTHOPAEDIC SURGERY

## 2024-01-18 PROCEDURE — 73552 X-RAY EXAM OF FEMUR 2/>: CPT

## 2024-01-18 PROCEDURE — 73560 X-RAY EXAM OF KNEE 1 OR 2: CPT

## 2024-01-18 PROCEDURE — G8400 PT W/DXA NO RESULTS DOC: HCPCS | Performed by: ORTHOPAEDIC SURGERY

## 2024-01-18 PROCEDURE — G8484 FLU IMMUNIZE NO ADMIN: HCPCS | Performed by: ORTHOPAEDIC SURGERY

## 2024-01-18 PROCEDURE — 1090F PRES/ABSN URINE INCON ASSESS: CPT | Performed by: ORTHOPAEDIC SURGERY

## 2024-01-18 PROCEDURE — G8427 DOCREV CUR MEDS BY ELIG CLIN: HCPCS | Performed by: ORTHOPAEDIC SURGERY

## 2024-01-18 PROCEDURE — 99213 OFFICE O/P EST LOW 20 MIN: CPT | Performed by: ORTHOPAEDIC SURGERY

## 2024-01-18 PROCEDURE — G8417 CALC BMI ABV UP PARAM F/U: HCPCS | Performed by: ORTHOPAEDIC SURGERY

## 2024-01-18 PROCEDURE — 1123F ACP DISCUSS/DSCN MKR DOCD: CPT | Performed by: ORTHOPAEDIC SURGERY

## 2024-01-18 PROCEDURE — 3017F COLORECTAL CA SCREEN DOC REV: CPT | Performed by: ORTHOPAEDIC SURGERY

## 2024-01-18 PROCEDURE — 1036F TOBACCO NON-USER: CPT | Performed by: ORTHOPAEDIC SURGERY

## 2024-01-18 NOTE — PROGRESS NOTES
Chief Complaint   Patient presents with    Follow-up     Wants to see Dr. Bey.  Patient here for LT Knee in 2007 pain for about a year.  She went to sit down and had a lot of pain.  She went to the Walk In clinic d/t pain in the knee and is here to see what  thoughts are.       SUBJECTIVE: 68-year-old female presents to the clinic for left knee pain that she states has been worsening over the last 3 weeks.  She states that the pain was gradual over the last year but over the last 3 weeks has become severe to the point where she is having trouble ambulating.  Patient has a history of left total knee arthroplasty from  physician in 2008 who is now retired.  Patient was seen recently in outpatient walk-in clinic where imaging was taken for the same knee pain.  Patient states that conservative methods of treatment such as over-the-counter pain meds have not helped her pain subside at all.  Patient is now using a walker at baseline.  She does have a history of right distal femur ORIF with us in the past and she states she is having no pain to her right lower extremity at this time.  She denies any new numbness or tingling down either lower extremity.  Has no other orthopedic complaints at this time.      Review of Systems -   General ROS: negative for - chills, fatigue, fever or night sweats  Respiratory ROS: no cough, shortness of breath, or wheezing  Cardiovascular ROS: no chest pain or dyspnea on exertion  Gastrointestinal ROS: no abdominal pain, change in bowel habits, or black or bloody stools  Genitourinary: no hematuria, dysuria, or incontinence   Musculoskeletal ROS:see above  Neurological ROS: no TIA or stroke symptoms       OBJECTIVE:   Alert and oriented X 3, no acute distress, respirations easy and unlabored with no audible wheezes, skin warm and dry, speech and dress appropriate for noted age, affect euthymic.    Extremity:  Left lower extremity:  +2/4 DP PT pulse good cap refill EXTR more  perfused  Compartment soft compressible  Skin circumferentially intact, noticeable well-healed incision scar from left total knee arthroplasty.  Distal sensation intact L4-S1 dermatomes  Positive EHL/DF/PF  Patient able to actively flex knee from 0 degrees to 90 degrees, this is limited past 90 degrees due to pain.  I am able to actively flex knee just past 90 degrees which is limited due to pain.  Tenderness to palpation over medial tibia.  No tenderness to palpation anywhere else over left knee.  Knee appears stable when testing varus valgus stability.    Right lower extremity:  +2/4 DP PT pulse good cap refill extremity warm and perfused  Compartment soft compressible  Skin circumferentially intact, noticeable well-healed incisions from right total knee arthroplasty and right distal femur ORIF.  Distal sensation intact L4-S1 dermatomes  Positive EHL/DF/PF  Patient able to actively flex and extend at the knee ankle and hip with no pain.  No tenderness to palpation anywhere over right lower extremity.    XR: 1/18/24   Left knee: Demonstrates status post left total knee arthroplasty that has signs of lysis in the tibial component.  There is lucency noted between the tibial component and cement.  It appears the component has shifted backwards.  Femoral component of total knee arthroplasty appears stable.  There is also noted calcifications that have occurred proximal to the patella.  No other abnormalities noted.    X-ray right femur: Demonstrates status post multiple revision right total knee arthroplasty and right distal femur open reduction internal fixation.  All components appear stable no signs of screw pullout.  There is 1 broken screw seen at the very top of the plate that was not seen on previous imaging.  No other abnormalities noted.        There were no vitals taken for this visit.    ASSESSMENT:     Diagnosis Orders   1. History of total knee arthroplasty, left  NM BONE SCAN 3 PHASE      Status post

## 2024-01-19 ENCOUNTER — TELEPHONE (OUTPATIENT)
Dept: ORTHOPEDIC SURGERY | Age: 69
End: 2024-01-19

## 2024-01-19 DIAGNOSIS — Z96.652 HISTORY OF TOTAL KNEE ARTHROPLASTY, LEFT: Primary | ICD-10-CM

## 2024-01-19 NOTE — TELEPHONE ENCOUNTER
Received call from patient requesting callback to discuss the bone scan that was ordered at her OV on 1/19/2024. Patient inquiring about the procedure and would like callback regarding this.    Routed to providers for recommendations.

## 2024-01-22 PROBLEM — Z96.652 HISTORY OF TOTAL KNEE ARTHROPLASTY, LEFT: Status: ACTIVE | Noted: 2024-01-22

## 2024-01-23 ENCOUNTER — HOSPITAL ENCOUNTER (OUTPATIENT)
Dept: NUCLEAR MEDICINE | Age: 69
Discharge: HOME | End: 2024-01-23
Payer: MEDICARE

## 2024-01-23 DIAGNOSIS — Z96.652 HISTORY OF TOTAL KNEE ARTHROPLASTY, LEFT: ICD-10-CM

## 2024-01-23 PROCEDURE — 3430000000 HC RX DIAGNOSTIC RADIOPHARMACEUTICAL: Performed by: RADIOLOGY

## 2024-01-23 PROCEDURE — 78315 BONE IMAGING 3 PHASE: CPT

## 2024-01-23 PROCEDURE — A9503 TC99M MEDRONATE: HCPCS | Performed by: RADIOLOGY

## 2024-01-23 RX ORDER — TC 99M MEDRONATE 20 MG/10ML
25 INJECTION, POWDER, LYOPHILIZED, FOR SOLUTION INTRAVENOUS
Status: COMPLETED | OUTPATIENT
Start: 2024-01-23 | End: 2024-01-23

## 2024-01-23 RX ADMIN — TC 99M MEDRONATE 25 MILLICURIE: 20 INJECTION, POWDER, LYOPHILIZED, FOR SOLUTION INTRAVENOUS at 11:11

## 2024-01-26 NOTE — TELEPHONE ENCOUNTER
I called and spoke to patient regarding her bone scan results.  Results reviewed in detail.  All questions answered to her satisfaction.  Discussed right knee shows mild increased activity and greatest activity at the left knee tibial component possibly indicative of inflammation.  Most of her pain is in the left knee.  Denies fever, chills, night sweats, skin warmth or redness. Dr. Bey discussed referral to Dr. Ovalle for second opinion.  Patient is already scheduled to see Dr. Ovalle in early March.  ESR and CRP ordered, patient can go to any [facility] facility to have these labs drawn.  She will follow-up in our office as needed.  Discussed for her to contact the office if she notices any red flags for infection to include fever, chills, night sweats, skin warmth or redness.

## 2024-01-29 ENCOUNTER — PATIENT MESSAGE (OUTPATIENT)
Dept: FAMILY MEDICINE CLINIC | Age: 69
End: 2024-01-29

## 2024-01-29 DIAGNOSIS — Z96.652 HISTORY OF TOTAL KNEE ARTHROPLASTY, LEFT: ICD-10-CM

## 2024-01-29 LAB
C-REACTIVE PROTEIN: 19 MG/L (ref 0–5)
SEDIMENTATION RATE, ERYTHROCYTE: 58 MM/HR (ref 0–20)
TSH SERPL DL<=0.05 MIU/L-ACNC: 0.28 UIU/ML (ref 0.27–4.2)

## 2024-01-30 NOTE — TELEPHONE ENCOUNTER
From: Gilma Graham  To: Dr. Vonnie Ortiz  Sent: 1/29/2024 6:25 PM EST  Subject: SED Rate    Hi, can you please look at the results for my SED rate. It’s high too. Is this something I need to be concerned about. ? You know how I worry. lol

## 2024-02-01 NOTE — TELEPHONE ENCOUNTER
From: Gilma Graham  To: Dr. Vonnie Ortiz  Sent: 1/29/2024 5:27 PM EST  Subject: TSH    Hi, I got my blood work done for my TSH. They had the wrong doctors name as to who gave the order. I wanted to make sure you looked at the results. It’s right on the borderline. Please let me know if I need to change anything. Thanks

## 2024-02-05 ENCOUNTER — TELEPHONE (OUTPATIENT)
Dept: ORTHOPEDIC SURGERY | Age: 69
End: 2024-02-05

## 2024-02-05 NOTE — TELEPHONE ENCOUNTER
I called and spoke to the patient regarding her lab results.  Results reviewed in detail, all questions answered to her satisfaction.  Patient's inflammatory labs are elevated and this appears to be chronic as there was some elevation to her inflammatory labs in 2022 and patient states that her ESR and CRP have been chronically elevated for years.  She has had multiple surgeries including revision surgery.  Denies any red flags for infection to include fever, chills, night sweats, skin redness or warmth.  She does have an appointment with Dr. Ovalle in March for second opinion.  Advised her to contact our office if any questions or concerns.  Patient is in agreement with this plan.

## 2024-02-05 NOTE — TELEPHONE ENCOUNTER
Pt called and LVM requesting call back on lab results completed. Routing to providers for review.

## 2024-03-07 ENCOUNTER — OFFICE VISIT (OUTPATIENT)
Dept: ORTHOPEDIC SURGERY | Age: 69
End: 2024-03-07

## 2024-03-07 VITALS — HEIGHT: 65 IN | WEIGHT: 240 LBS | BODY MASS INDEX: 39.99 KG/M2

## 2024-03-07 DIAGNOSIS — Z96.652 HISTORY OF TOTAL KNEE ARTHROPLASTY, LEFT: Primary | ICD-10-CM

## 2024-03-07 NOTE — PROGRESS NOTES
[facility]  ORTHOPAEDICS   DATE OF VISIT: 03/07/24  New Knee Patient     Referring Provider:   No referring provider defined for this encounter.    CHIEF COMPLAINT:   Chief Complaint   Patient presents with    New Patient     Patient is being seen today for L knee pain. Previous L KTA in 2007        HPI:      Gilma Graham is a 68 y.o. year old female who is seen today  for evaluation of left knee pain.  Of note the patient does have a history of bilateral total knee arthroplasties.  The right has undergone 2 previous revisions for loosening which is resulted in a longstem prosthesis on the tibial component side.  With respect to her left side original index procedure occurred in 2008 in [place] by a surgeon who is now retired.  She had been evaluated initially by one of my partners for possible loosening of her left tibial baseplate given she was experiencing pain at this location on clinical exam.  Bone scan had been ordered which did show increased uptake under the medial aspect of the tibial baseplate which is consistent with her pain.    Patient states that currently the knee is less bothersome and she is returning to similar activities without discomfort recently.    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    Overview:  CELIO Paredes [facility] Rx, 2001    Chronic nonalcoholic liver disease 01/14/2016    Diabetes (HCC) 2019    Fracture of right lower extremity     Hyperlipidemia     Hypertension     Hypothyroidism     Malignant neoplasm of ovary (HCC) 2001    Obesity     Positive FIT (fecal immunochemical test) 04/26/2018    Type II or unspecified type diabetes mellitus without mention of complication, not stated as uncontrolled     Unspecified vitamin D deficiency 01/14/2016       PAST SURGICAL HISTORY  Past Surgical History:   Procedure Laterality Date    COLONOSCOPY      FEMUR FRACTURE SURGERY Right 8/31/2022    RIGHT FEMUR  REVISION OPEN REDUCTION INTERNAL

## 2024-03-25 ENCOUNTER — OFFICE VISIT (OUTPATIENT)
Dept: FAMILY MEDICINE CLINIC | Age: 69
End: 2024-03-25
Payer: MEDICARE

## 2024-03-25 VITALS
HEIGHT: 65 IN | RESPIRATION RATE: 20 BRPM | DIASTOLIC BLOOD PRESSURE: 84 MMHG | SYSTOLIC BLOOD PRESSURE: 138 MMHG | BODY MASS INDEX: 39.99 KG/M2 | HEART RATE: 77 BPM | OXYGEN SATURATION: 97 % | WEIGHT: 240 LBS

## 2024-03-25 DIAGNOSIS — E03.9 HYPOTHYROIDISM (ACQUIRED): ICD-10-CM

## 2024-03-25 DIAGNOSIS — Z00.00 MEDICARE ANNUAL WELLNESS VISIT, SUBSEQUENT: Primary | ICD-10-CM

## 2024-03-25 DIAGNOSIS — E11.9 TYPE 2 DIABETES MELLITUS WITHOUT COMPLICATION, WITHOUT LONG-TERM CURRENT USE OF INSULIN (HCC): ICD-10-CM

## 2024-03-25 PROBLEM — T45.1X5A CHEMOTHERAPY-INDUCED NEUTROPENIA (HCC): Status: RESOLVED | Noted: 2023-11-07 | Resolved: 2024-03-25

## 2024-03-25 PROBLEM — D70.1 CHEMOTHERAPY-INDUCED NEUTROPENIA (HCC): Status: RESOLVED | Noted: 2023-11-07 | Resolved: 2024-03-25

## 2024-03-25 LAB — HBA1C MFR BLD: 7.1 %

## 2024-03-25 PROCEDURE — 3051F HG A1C>EQUAL 7.0%<8.0%: CPT | Performed by: FAMILY MEDICINE

## 2024-03-25 PROCEDURE — 3079F DIAST BP 80-89 MM HG: CPT | Performed by: FAMILY MEDICINE

## 2024-03-25 PROCEDURE — G8484 FLU IMMUNIZE NO ADMIN: HCPCS | Performed by: FAMILY MEDICINE

## 2024-03-25 PROCEDURE — 3075F SYST BP GE 130 - 139MM HG: CPT | Performed by: FAMILY MEDICINE

## 2024-03-25 PROCEDURE — 83036 HEMOGLOBIN GLYCOSYLATED A1C: CPT | Performed by: FAMILY MEDICINE

## 2024-03-25 PROCEDURE — G0439 PPPS, SUBSEQ VISIT: HCPCS | Performed by: FAMILY MEDICINE

## 2024-03-25 PROCEDURE — 3017F COLORECTAL CA SCREEN DOC REV: CPT | Performed by: FAMILY MEDICINE

## 2024-03-25 PROCEDURE — 1123F ACP DISCUSS/DSCN MKR DOCD: CPT | Performed by: FAMILY MEDICINE

## 2024-03-25 RX ORDER — TRIAMCINOLONE ACETONIDE 1 MG/G
CREAM TOPICAL
Qty: 453.6 G | Refills: 3 | Status: SHIPPED | OUTPATIENT
Start: 2024-03-25

## 2024-03-25 RX ORDER — LEVOTHYROXINE SODIUM 0.15 MG/1
150 TABLET ORAL
Qty: 150 TABLET | Refills: 1
Start: 2024-03-25

## 2024-03-25 RX ORDER — LEVOTHYROXINE SODIUM 0.2 MG/1
TABLET ORAL
Qty: 90 TABLET | Refills: 1 | Status: SHIPPED | OUTPATIENT
Start: 2024-03-25

## 2024-03-25 RX ORDER — ATORVASTATIN CALCIUM 20 MG/1
TABLET, FILM COATED ORAL
Qty: 90 TABLET | Refills: 3
Start: 2024-03-25

## 2024-03-25 ASSESSMENT — PATIENT HEALTH QUESTIONNAIRE - PHQ9
2. FEELING DOWN, DEPRESSED OR HOPELESS: NOT AT ALL
1. LITTLE INTEREST OR PLEASURE IN DOING THINGS: NOT AT ALL
SUM OF ALL RESPONSES TO PHQ9 QUESTIONS 1 & 2: 0
SUM OF ALL RESPONSES TO PHQ QUESTIONS 1-9: 0

## 2024-03-25 ASSESSMENT — LIFESTYLE VARIABLES: HOW OFTEN DO YOU HAVE A DRINK CONTAINING ALCOHOL: MONTHLY OR LESS

## 2024-03-25 NOTE — PROGRESS NOTES
Medicare Annual Wellness Visit    Gilma Graham is here for Medicare AWV    Assessment & Plan   Medicare annual wellness visit, subsequent  Type 2 diabetes mellitus without complication, without long-term current use of insulin (HCC)  -     POCT glycosylated hemoglobin (Hb A1C)  -     CBC; Future  -     Comprehensive Metabolic Panel; Future  -     Lipid Panel; Future  -     AMB EXT URINE MICROALBUMIN; Future  -     metFORMIN (GLUCOPHAGE) 1000 MG tablet; Take 1 tablet by mouth 2 times daily (with meals)  -     atorvastatin (LIPITOR) 20 MG tablet; TAKE 1 TABLET DAILY,   Hypothyroidism (acquired)  -     levothyroxine (SYNTHROID) 200 MCG tablet; take 1 tablet by mouth once daily,   -     TSH; Future  -     levothyroxine (SYNTHROID) 150 MCG tablet; Take 1 tablet by mouth three times a week    Recommendations for Preventive Services Due: see orders and patient instructions/AVS.  Recommended screening schedule for the next 5-10 years is provided to the patient in written form: see Patient Instructions/AVS.     Return in 3 months (on 6/25/2024) for f/u DM and Medicare Annual Wellness Visit in 1 year.     Subjective   Patient doing well on current regimen for Diabetes.    Patient's complete Health Risk Assessment and screening values have been reviewed and are found in Flowsheets. The following problems were reviewed today and where indicated follow up appointments were made and/or referrals ordered.    Positive Risk Factor Screenings with Interventions:                Activity, Diet, and Weight:  On average, how many days per week do you engage in moderate to strenuous exercise (like a brisk walk)?: 2 days  On average, how many minutes do you engage in exercise at this level?: 60 min    Do you eat balanced/healthy meals regularly?: Yes    Body mass index is 39.94 kg/m². (!) Abnormal    Obesity Interventions:  See AVS for additional education material                               Objective   Vitals:    03/25/24 [id number]

## 2024-04-30 LAB — DIABETIC RETINOPATHY: NEGATIVE

## 2024-05-03 ENCOUNTER — TELEPHONE (OUTPATIENT)
Dept: FAMILY MEDICINE CLINIC | Age: 69
End: 2024-05-03

## 2024-05-03 NOTE — TELEPHONE ENCOUNTER
Patient called for an appt stating she's been having Lt sided abdominal pain, which is dull and started about 1 1/2 wks ago.  Patient stated it's become more frequent and daily.  Appt made for 5/6/24 at 9:00 am.  I advised patient to go to the ED if symptoms increase or worsen.  Patient was agreeable.     Last seen 3/25/2024  Next appt 5/6/2024

## 2024-05-06 ENCOUNTER — OFFICE VISIT (OUTPATIENT)
Dept: FAMILY MEDICINE CLINIC | Age: 69
End: 2024-05-06
Payer: MEDICARE

## 2024-05-06 ENCOUNTER — PATIENT MESSAGE (OUTPATIENT)
Dept: FAMILY MEDICINE CLINIC | Age: 69
End: 2024-05-06

## 2024-05-06 VITALS
DIASTOLIC BLOOD PRESSURE: 84 MMHG | RESPIRATION RATE: 20 BRPM | HEART RATE: 80 BPM | SYSTOLIC BLOOD PRESSURE: 140 MMHG | HEIGHT: 65 IN | BODY MASS INDEX: 39.94 KG/M2 | OXYGEN SATURATION: 97 %

## 2024-05-06 DIAGNOSIS — R10.32 LEFT LOWER QUADRANT ABDOMINAL PAIN: Primary | ICD-10-CM

## 2024-05-06 DIAGNOSIS — E03.9 HYPOTHYROIDISM (ACQUIRED): ICD-10-CM

## 2024-05-06 LAB
ALBUMIN: 4.2 G/DL (ref 3.5–5.2)
ALP BLD-CCNC: 184 U/L (ref 35–104)
ALT SERPL-CCNC: 30 U/L (ref 0–32)
ANION GAP SERPL CALCULATED.3IONS-SCNC: 16 MMOL/L (ref 7–16)
AST SERPL-CCNC: 38 U/L (ref 0–31)
BASOPHILS ABSOLUTE: 0.03 K/UL (ref 0–0.2)
BASOPHILS RELATIVE PERCENT: 1 % (ref 0–2)
BILIRUB SERPL-MCNC: 0.3 MG/DL (ref 0–1.2)
BILIRUBIN, POC: NORMAL
BLOOD URINE, POC: NORMAL
BUN BLDV-MCNC: 23 MG/DL (ref 6–23)
CALCIUM SERPL-MCNC: 10 MG/DL (ref 8.6–10.2)
CHLORIDE BLD-SCNC: 100 MMOL/L (ref 98–107)
CLARITY, POC: CLEAR
CO2: 23 MMOL/L (ref 22–29)
COLOR, POC: NORMAL
CREAT SERPL-MCNC: 0.8 MG/DL (ref 0.5–1)
EOSINOPHILS ABSOLUTE: 0.06 K/UL (ref 0.05–0.5)
EOSINOPHILS RELATIVE PERCENT: 2 % (ref 0–6)
GFR, ESTIMATED: 76 ML/MIN/1.73M2
GLUCOSE BLD-MCNC: 111 MG/DL (ref 74–99)
GLUCOSE URINE, POC: NORMAL
HCT VFR BLD CALC: 40.5 % (ref 34–48)
HEMOGLOBIN: 11.7 G/DL (ref 11.5–15.5)
KETONES, POC: NORMAL
LEUKOCYTE EST, POC: NORMAL
LYMPHOCYTES ABSOLUTE: 1.19 K/UL (ref 1.5–4)
LYMPHOCYTES RELATIVE PERCENT: 36 % (ref 20–42)
MCH RBC QN AUTO: 23.8 PG (ref 26–35)
MCHC RBC AUTO-ENTMCNC: 28.9 G/DL (ref 32–34.5)
MCV RBC AUTO: 82.3 FL (ref 80–99.9)
MONOCYTES ABSOLUTE: 0.52 K/UL (ref 0.1–0.95)
MONOCYTES RELATIVE PERCENT: 16 % (ref 2–12)
NEUTROPHILS ABSOLUTE: 1.51 K/UL (ref 1.8–7.3)
NEUTROPHILS RELATIVE PERCENT: 46 % (ref 43–80)
NITRITE, POC: NORMAL
PDW BLD-RTO: 16.7 % (ref 11.5–15)
PH, POC: 8
PLATELET, FLUORESCENCE: 175 K/UL (ref 130–450)
PMV BLD AUTO: ABNORMAL FL (ref 7–12)
POTASSIUM SERPL-SCNC: 5 MMOL/L (ref 3.5–5)
PROTEIN, POC: NORMAL
RBC # BLD: 4.92 M/UL (ref 3.5–5.5)
RBC # BLD: ABNORMAL 10*6/UL
SODIUM BLD-SCNC: 139 MMOL/L (ref 132–146)
SPECIFIC GRAVITY, POC: 1.01
TOTAL PROTEIN: 8 G/DL (ref 6.4–8.3)
UROBILINOGEN, POC: 0.2
WBC # BLD: 3.3 K/UL (ref 4.5–11.5)

## 2024-05-06 PROCEDURE — 3077F SYST BP >= 140 MM HG: CPT | Performed by: FAMILY MEDICINE

## 2024-05-06 PROCEDURE — 3017F COLORECTAL CA SCREEN DOC REV: CPT | Performed by: FAMILY MEDICINE

## 2024-05-06 PROCEDURE — G8417 CALC BMI ABV UP PARAM F/U: HCPCS | Performed by: FAMILY MEDICINE

## 2024-05-06 PROCEDURE — 1123F ACP DISCUSS/DSCN MKR DOCD: CPT | Performed by: FAMILY MEDICINE

## 2024-05-06 PROCEDURE — 99214 OFFICE O/P EST MOD 30 MIN: CPT | Performed by: FAMILY MEDICINE

## 2024-05-06 PROCEDURE — 1090F PRES/ABSN URINE INCON ASSESS: CPT | Performed by: FAMILY MEDICINE

## 2024-05-06 PROCEDURE — G8427 DOCREV CUR MEDS BY ELIG CLIN: HCPCS | Performed by: FAMILY MEDICINE

## 2024-05-06 PROCEDURE — 3079F DIAST BP 80-89 MM HG: CPT | Performed by: FAMILY MEDICINE

## 2024-05-06 PROCEDURE — 1036F TOBACCO NON-USER: CPT | Performed by: FAMILY MEDICINE

## 2024-05-06 PROCEDURE — 81002 URINALYSIS NONAUTO W/O SCOPE: CPT | Performed by: FAMILY MEDICINE

## 2024-05-06 PROCEDURE — G8400 PT W/DXA NO RESULTS DOC: HCPCS | Performed by: FAMILY MEDICINE

## 2024-05-06 RX ORDER — LEVOTHYROXINE SODIUM 0.15 MG/1
150 TABLET ORAL
Qty: 150 TABLET | Refills: 1
Start: 2024-05-06

## 2024-05-06 RX ORDER — LEVOTHYROXINE SODIUM 0.2 MG/1
TABLET ORAL
Qty: 90 TABLET | Refills: 1
Start: 2024-05-06

## 2024-05-06 ASSESSMENT — ENCOUNTER SYMPTOMS
ABDOMINAL PAIN: 1
CONSTIPATION: 0
VOMITING: 0
HEMATOCHEZIA: 0
DIARRHEA: 0
NAUSEA: 0

## 2024-05-06 NOTE — PROGRESS NOTES
Palpations: Abdomen is soft.      Tenderness: There is no abdominal tenderness. There is no guarding or rebound.   Skin:     General: Skin is warm and dry.   Neurological:      Mental Status: She is alert.           ASSESSMENT/PLAN  Gilma was seen today for abdominal pain.    Diagnoses and all orders for this visit:    Left lower quadrant abdominal pain  -     POCT Urinalysis no Micro  -     Comprehensive Metabolic Panel  -     CBC with Auto Differential  -     CT ABDOMEN WO CONTRAST Additional Contrast? Oral; Future    Hypothyroidism (acquired)  -     levothyroxine (SYNTHROID) 200 MCG tablet; take 1 tablet by mouth once daily  -     levothyroxine (SYNTHROID) 150 MCG tablet; Take 1 tablet by mouth three times a week        /MyChart follow up if tests abnormal.    Return for scheduled appointment. or sooner if necessary.            I have reviewed my findings and recommendations with Gilma.     Vonnie Ortiz MD, M.D

## 2024-05-07 NOTE — TELEPHONE ENCOUNTER
From: Gilma Graham  To: Dr. Vonnie Ortiz  Sent: 5/6/2024 6:09 PM EDT  Subject: Blood work    Hi, I was just wondering if any of my blood work results should be cause for concern. Some things were high, some low. Can you please let me know what you think after you review it. Thanks

## 2024-06-11 ENCOUNTER — PATIENT MESSAGE (OUTPATIENT)
Dept: FAMILY MEDICINE CLINIC | Age: 69
End: 2024-06-11

## 2024-06-12 NOTE — TELEPHONE ENCOUNTER
From: Gilma Graham  To: Dr. Vonnie Ortiz  Sent: 6/11/2024 5:49 PM EDT  Subject: Ct scan    Hi, I have not heard back about the results of my Ct scan. I just want to make sure that everything looked ok. The pain I was having in my abdomen has gone away. Thanks

## 2024-07-10 ENCOUNTER — OFFICE VISIT (OUTPATIENT)
Dept: FAMILY MEDICINE CLINIC | Age: 69
End: 2024-07-10
Payer: MEDICARE

## 2024-07-10 VITALS
HEART RATE: 82 BPM | DIASTOLIC BLOOD PRESSURE: 84 MMHG | SYSTOLIC BLOOD PRESSURE: 138 MMHG | RESPIRATION RATE: 20 BRPM | OXYGEN SATURATION: 97 % | BODY MASS INDEX: 39.99 KG/M2 | WEIGHT: 240 LBS | HEIGHT: 65 IN

## 2024-07-10 DIAGNOSIS — Z12.31 ENCOUNTER FOR SCREENING MAMMOGRAM FOR MALIGNANT NEOPLASM OF BREAST: ICD-10-CM

## 2024-07-10 DIAGNOSIS — E66.01 SEVERE OBESITY (BMI 35.0-39.9) WITH COMORBIDITY (HCC): ICD-10-CM

## 2024-07-10 DIAGNOSIS — I10 ESSENTIAL HYPERTENSION: ICD-10-CM

## 2024-07-10 DIAGNOSIS — E11.9 TYPE 2 DIABETES MELLITUS WITHOUT COMPLICATION, WITHOUT LONG-TERM CURRENT USE OF INSULIN (HCC): Primary | ICD-10-CM

## 2024-07-10 LAB — HBA1C MFR BLD: 7.2 %

## 2024-07-10 PROCEDURE — 3051F HG A1C>EQUAL 7.0%<8.0%: CPT | Performed by: FAMILY MEDICINE

## 2024-07-10 PROCEDURE — G8427 DOCREV CUR MEDS BY ELIG CLIN: HCPCS | Performed by: FAMILY MEDICINE

## 2024-07-10 PROCEDURE — G8400 PT W/DXA NO RESULTS DOC: HCPCS | Performed by: FAMILY MEDICINE

## 2024-07-10 PROCEDURE — 1090F PRES/ABSN URINE INCON ASSESS: CPT | Performed by: FAMILY MEDICINE

## 2024-07-10 PROCEDURE — 3017F COLORECTAL CA SCREEN DOC REV: CPT | Performed by: FAMILY MEDICINE

## 2024-07-10 PROCEDURE — 83036 HEMOGLOBIN GLYCOSYLATED A1C: CPT | Performed by: FAMILY MEDICINE

## 2024-07-10 PROCEDURE — 1036F TOBACCO NON-USER: CPT | Performed by: FAMILY MEDICINE

## 2024-07-10 PROCEDURE — 1123F ACP DISCUSS/DSCN MKR DOCD: CPT | Performed by: FAMILY MEDICINE

## 2024-07-10 PROCEDURE — 2022F DILAT RTA XM EVC RTNOPTHY: CPT | Performed by: FAMILY MEDICINE

## 2024-07-10 PROCEDURE — G8417 CALC BMI ABV UP PARAM F/U: HCPCS | Performed by: FAMILY MEDICINE

## 2024-07-10 PROCEDURE — 99214 OFFICE O/P EST MOD 30 MIN: CPT | Performed by: FAMILY MEDICINE

## 2024-07-10 PROCEDURE — 3079F DIAST BP 80-89 MM HG: CPT | Performed by: FAMILY MEDICINE

## 2024-07-10 PROCEDURE — 3075F SYST BP GE 130 - 139MM HG: CPT | Performed by: FAMILY MEDICINE

## 2024-07-10 RX ORDER — METRONIDAZOLE 7.5 MG/G
LOTION TOPICAL
Qty: 3 EACH | Refills: 3 | Status: SHIPPED | OUTPATIENT
Start: 2024-07-10

## 2024-07-10 RX ORDER — LOSARTAN POTASSIUM AND HYDROCHLOROTHIAZIDE 25; 100 MG/1; MG/1
1 TABLET ORAL DAILY
Qty: 90 TABLET | Refills: 1 | Status: SHIPPED | OUTPATIENT
Start: 2024-07-10

## 2024-07-10 RX ORDER — ATORVASTATIN CALCIUM 20 MG/1
TABLET, FILM COATED ORAL
Qty: 90 TABLET | Refills: 3 | Status: SHIPPED | OUTPATIENT
Start: 2024-07-10

## 2024-07-10 RX ORDER — ALLOPURINOL 100 MG/1
100 TABLET ORAL DAILY
Qty: 90 TABLET | Refills: 1 | Status: SHIPPED | OUTPATIENT
Start: 2024-07-10

## 2024-07-10 SDOH — ECONOMIC STABILITY: INCOME INSECURITY: HOW HARD IS IT FOR YOU TO PAY FOR THE VERY BASICS LIKE FOOD, HOUSING, MEDICAL CARE, AND HEATING?: NOT HARD AT ALL

## 2024-07-10 SDOH — ECONOMIC STABILITY: FOOD INSECURITY: WITHIN THE PAST 12 MONTHS, THE FOOD YOU BOUGHT JUST DIDN'T LAST AND YOU DIDN'T HAVE MONEY TO GET MORE.: NEVER TRUE

## 2024-07-10 SDOH — ECONOMIC STABILITY: FOOD INSECURITY: WITHIN THE PAST 12 MONTHS, YOU WORRIED THAT YOUR FOOD WOULD RUN OUT BEFORE YOU GOT MONEY TO BUY MORE.: NEVER TRUE

## 2024-07-10 ASSESSMENT — ENCOUNTER SYMPTOMS
DIARRHEA: 0
NAUSEA: 0
VOMITING: 0
SHORTNESS OF BREATH: 0

## 2024-07-10 NOTE — PROGRESS NOTES
sugar qam 1 kit 0    ONE TOUCH ULTRASOFT LANCETS MISC Check blood sugar every morning 150 each 3    blood glucose test strips (ONETOUCH ULTRA) strip Check blood sugar every morning 150 each 3    Misc. Devices MISC 1 each by Does not apply route once as needed (On shower chair) 1 each 0     No current facility-administered medications for this visit.     Facility-Administered Medications Ordered in Other Visits   Medication Dose Route Frequency Provider Last Rate Last Admin    iopamidol (ISOVUE-370) 76 % injection 18 mL  18 mL IntraVENous ONCE PRN Duke Arana DO             Review Of Systems:    Review of Systems   Eyes:  Negative for visual disturbance.   Respiratory:  Negative for shortness of breath.    Cardiovascular:  Negative for chest pain, palpitations and leg swelling.   Gastrointestinal:  Negative for diarrhea, nausea and vomiting.   Genitourinary:  Negative for difficulty urinating, dysuria and frequency.   Skin:  Negative for rash.   Psychiatric/Behavioral:  Negative for dysphoric mood.            OBJECTIVE:     VS:  Wt Readings from Last 3 Encounters:   07/10/24 108.9 kg (240 lb)   03/25/24 108.9 kg (240 lb)   03/07/24 108.9 kg (240 lb)     Vitals:    07/10/24 0805   BP: 138/84   Pulse: 82   Resp: 20   SpO2: 97%       Physical Exam  Vitals reviewed.   Constitutional:       General: She is not in acute distress.     Appearance: She is well-developed.   Neck:      Vascular: No carotid bruit.   Cardiovascular:      Rate and Rhythm: Normal rate and regular rhythm.      Heart sounds: Normal heart sounds. No murmur heard.     No gallop.   Pulmonary:      Effort: Pulmonary effort is normal.      Breath sounds: Normal breath sounds. No wheezing or rales.   Abdominal:      General: Bowel sounds are normal. There is no distension.      Palpations: Abdomen is soft.      Tenderness: There is no abdominal tenderness.   Musculoskeletal:      Cervical back: Neck supple.      Right lower leg: No edema.      Left

## 2024-08-07 DIAGNOSIS — Z12.31 ENCOUNTER FOR SCREENING MAMMOGRAM FOR MALIGNANT NEOPLASM OF BREAST: ICD-10-CM

## 2024-08-22 DIAGNOSIS — E03.9 HYPOTHYROIDISM (ACQUIRED): ICD-10-CM

## 2024-08-23 DIAGNOSIS — E03.9 HYPOTHYROIDISM (ACQUIRED): ICD-10-CM

## 2024-08-23 NOTE — TELEPHONE ENCOUNTER
Last seen 7/10/2024  Next appt 10/29/2024    Requested Prescriptions     Pending Prescriptions Disp Refills    levothyroxine (SYNTHROID) 150 MCG tablet 150 tablet 1     Sig: Take 1 tablet by mouth three times a week

## 2024-08-23 NOTE — TELEPHONE ENCOUNTER
Last seen 7/10/2024  Next appt 10/29/2024    Requested Prescriptions     Pending Prescriptions Disp Refills    levothyroxine (SYNTHROID) 150 MCG tablet [Pharmacy Med Name: LEVOTHYROXINE 150 MCG TAB] 39 tablet 0     Sig: Take 1 tablet by mouth three times a week

## 2024-08-24 RX ORDER — LEVOTHYROXINE SODIUM 0.15 MG/1
150 TABLET ORAL
Qty: 39 TABLET | Refills: 0 | OUTPATIENT
Start: 2024-08-26

## 2024-08-24 RX ORDER — LEVOTHYROXINE SODIUM 0.15 MG/1
150 TABLET ORAL
Qty: 150 TABLET | Refills: 1 | Status: SHIPPED | OUTPATIENT
Start: 2024-08-26

## 2024-08-26 ENCOUNTER — PATIENT MESSAGE (OUTPATIENT)
Dept: FAMILY MEDICINE CLINIC | Age: 69
End: 2024-08-26

## 2024-08-26 DIAGNOSIS — E03.9 HYPOTHYROIDISM (ACQUIRED): ICD-10-CM

## 2024-08-27 ENCOUNTER — PATIENT MESSAGE (OUTPATIENT)
Dept: FAMILY MEDICINE CLINIC | Age: 69
End: 2024-08-27

## 2024-08-27 DIAGNOSIS — E03.9 HYPOTHYROIDISM (ACQUIRED): ICD-10-CM

## 2024-08-27 RX ORDER — LEVOTHYROXINE SODIUM 150 UG/1
150 TABLET ORAL
Qty: 150 TABLET | Refills: 1 | Status: SHIPPED | OUTPATIENT
Start: 2024-08-28

## 2024-08-27 RX ORDER — LEVOTHYROXINE SODIUM 150 UG/1
150 TABLET ORAL
Qty: 150 TABLET | Refills: 1 | Status: SHIPPED
Start: 2024-08-28 | End: 2024-08-27 | Stop reason: SDUPTHER

## 2024-08-27 NOTE — TELEPHONE ENCOUNTER
Patient called to follow up on her refill request.  I informed her that msg was rec'd.    RX was entered in Epic, order was not sent.  Please send E-Script to Ron.    Last seen 7/10/2024  Next appt 10/29/2024    Requested Prescriptions     Pending Prescriptions Disp Refills    levothyroxine (SYNTHROID) 150 MCG tablet 150 tablet 1     Sig: Take 1 tablet by mouth three times a week      Ron

## 2024-09-11 DIAGNOSIS — Z96.652 HISTORY OF TOTAL KNEE ARTHROPLASTY, LEFT: Primary | ICD-10-CM

## 2024-09-12 ENCOUNTER — OFFICE VISIT (OUTPATIENT)
Dept: ORTHOPEDIC SURGERY | Age: 69
End: 2024-09-12
Payer: MEDICARE

## 2024-09-12 VITALS — BODY MASS INDEX: 38.32 KG/M2 | WEIGHT: 230 LBS | HEIGHT: 65 IN

## 2024-09-12 DIAGNOSIS — T84.039A PROSTHETIC JOINT LOOSENING, INITIAL ENCOUNTER (HCC): Primary | ICD-10-CM

## 2024-09-12 DIAGNOSIS — M25.561 RIGHT KNEE PAIN, UNSPECIFIED CHRONICITY: ICD-10-CM

## 2024-09-12 DIAGNOSIS — Z96.651 HISTORY OF TOTAL KNEE ARTHROPLASTY, RIGHT: ICD-10-CM

## 2024-09-12 DIAGNOSIS — M97.11XD PERIPROSTHETIC FRACTURE AROUND INTERNAL PROSTHETIC RIGHT KNEE JOINT, SUBSEQUENT ENCOUNTER: ICD-10-CM

## 2024-09-12 DIAGNOSIS — Z96.652 HISTORY OF TOTAL KNEE ARTHROPLASTY, LEFT: ICD-10-CM

## 2024-09-12 PROCEDURE — G8427 DOCREV CUR MEDS BY ELIG CLIN: HCPCS | Performed by: STUDENT IN AN ORGANIZED HEALTH CARE EDUCATION/TRAINING PROGRAM

## 2024-09-12 PROCEDURE — 1090F PRES/ABSN URINE INCON ASSESS: CPT | Performed by: STUDENT IN AN ORGANIZED HEALTH CARE EDUCATION/TRAINING PROGRAM

## 2024-09-12 PROCEDURE — 1036F TOBACCO NON-USER: CPT | Performed by: STUDENT IN AN ORGANIZED HEALTH CARE EDUCATION/TRAINING PROGRAM

## 2024-09-12 PROCEDURE — 99213 OFFICE O/P EST LOW 20 MIN: CPT | Performed by: STUDENT IN AN ORGANIZED HEALTH CARE EDUCATION/TRAINING PROGRAM

## 2024-09-12 PROCEDURE — 3017F COLORECTAL CA SCREEN DOC REV: CPT | Performed by: STUDENT IN AN ORGANIZED HEALTH CARE EDUCATION/TRAINING PROGRAM

## 2024-09-12 PROCEDURE — 1123F ACP DISCUSS/DSCN MKR DOCD: CPT | Performed by: STUDENT IN AN ORGANIZED HEALTH CARE EDUCATION/TRAINING PROGRAM

## 2024-09-12 PROCEDURE — G8417 CALC BMI ABV UP PARAM F/U: HCPCS | Performed by: STUDENT IN AN ORGANIZED HEALTH CARE EDUCATION/TRAINING PROGRAM

## 2024-09-12 PROCEDURE — G8400 PT W/DXA NO RESULTS DOC: HCPCS | Performed by: STUDENT IN AN ORGANIZED HEALTH CARE EDUCATION/TRAINING PROGRAM

## 2024-10-29 ENCOUNTER — OFFICE VISIT (OUTPATIENT)
Dept: FAMILY MEDICINE CLINIC | Age: 69
End: 2024-10-29

## 2024-10-29 ENCOUNTER — PATIENT MESSAGE (OUTPATIENT)
Dept: FAMILY MEDICINE CLINIC | Age: 69
End: 2024-10-29

## 2024-10-29 VITALS
DIASTOLIC BLOOD PRESSURE: 84 MMHG | SYSTOLIC BLOOD PRESSURE: 136 MMHG | OXYGEN SATURATION: 97 % | HEART RATE: 76 BPM | RESPIRATION RATE: 18 BRPM

## 2024-10-29 DIAGNOSIS — S01.312A TORN EARLOBE, LEFT, INITIAL ENCOUNTER: ICD-10-CM

## 2024-10-29 DIAGNOSIS — E03.9 HYPOTHYROIDISM (ACQUIRED): ICD-10-CM

## 2024-10-29 DIAGNOSIS — E11.9 TYPE 2 DIABETES MELLITUS WITHOUT COMPLICATION, WITHOUT LONG-TERM CURRENT USE OF INSULIN (HCC): Primary | ICD-10-CM

## 2024-10-29 DIAGNOSIS — I10 ESSENTIAL HYPERTENSION: ICD-10-CM

## 2024-10-29 DIAGNOSIS — E11.9 TYPE 2 DIABETES MELLITUS WITHOUT COMPLICATION, WITHOUT LONG-TERM CURRENT USE OF INSULIN (HCC): ICD-10-CM

## 2024-10-29 LAB
ALBUMIN: 3.8 G/DL (ref 3.5–5.2)
ALP BLD-CCNC: 161 U/L (ref 35–104)
ALT SERPL-CCNC: 24 U/L (ref 0–32)
ANION GAP SERPL CALCULATED.3IONS-SCNC: 12 MMOL/L (ref 7–16)
AST SERPL-CCNC: 35 U/L (ref 0–31)
BILIRUB SERPL-MCNC: 0.4 MG/DL (ref 0–1.2)
BUN BLDV-MCNC: 20 MG/DL (ref 6–23)
CALCIUM SERPL-MCNC: 10 MG/DL (ref 8.6–10.2)
CHLORIDE BLD-SCNC: 102 MMOL/L (ref 98–107)
CHOLESTEROL, TOTAL: 142 MG/DL
CO2: 26 MMOL/L (ref 22–29)
CREAT SERPL-MCNC: 0.8 MG/DL (ref 0.5–1)
GFR, ESTIMATED: 77 ML/MIN/1.73M2
GLUCOSE BLD-MCNC: 126 MG/DL (ref 74–99)
HBA1C MFR BLD: 7 %
HCT VFR BLD CALC: 37.9 % (ref 34–48)
HDLC SERPL-MCNC: 42 MG/DL
HEMOGLOBIN: 11.1 G/DL (ref 11.5–15.5)
LDL CHOLESTEROL: 67 MG/DL
MCH RBC QN AUTO: 23.7 PG (ref 26–35)
MCHC RBC AUTO-ENTMCNC: 29.3 G/DL (ref 32–34.5)
MCV RBC AUTO: 80.8 FL (ref 80–99.9)
PDW BLD-RTO: 17.9 % (ref 11.5–15)
PLATELET, FLUORESCENCE: 149 K/UL (ref 130–450)
PMV BLD AUTO: ABNORMAL FL (ref 7–12)
POTASSIUM SERPL-SCNC: 4.9 MMOL/L (ref 3.5–5)
RBC # BLD: 4.69 M/UL (ref 3.5–5.5)
SODIUM BLD-SCNC: 140 MMOL/L (ref 132–146)
TOTAL PROTEIN: 8 G/DL (ref 6.4–8.3)
TRIGL SERPL-MCNC: 166 MG/DL
TSH SERPL DL<=0.05 MIU/L-ACNC: 0.16 UIU/ML (ref 0.27–4.2)
VLDLC SERPL CALC-MCNC: 33 MG/DL
WBC # BLD: 3.1 K/UL (ref 4.5–11.5)

## 2024-10-29 RX ORDER — INDOMETHACIN 75 MG/1
75 CAPSULE, EXTENDED RELEASE ORAL 2 TIMES DAILY WITH MEALS
Qty: 180 CAPSULE | Refills: 3 | Status: SHIPPED | OUTPATIENT
Start: 2024-10-29

## 2024-10-29 RX ORDER — ATORVASTATIN CALCIUM 20 MG/1
TABLET, FILM COATED ORAL
Qty: 90 TABLET | Refills: 3 | Status: SHIPPED | OUTPATIENT
Start: 2024-10-29

## 2024-10-29 RX ORDER — LEVOTHYROXINE SODIUM 200 UG/1
TABLET ORAL
Qty: 90 TABLET | Refills: 1 | Status: SHIPPED | OUTPATIENT
Start: 2024-10-29

## 2024-10-29 RX ORDER — LOSARTAN POTASSIUM AND HYDROCHLOROTHIAZIDE 25; 100 MG/1; MG/1
1 TABLET ORAL DAILY
Qty: 90 TABLET | Refills: 1 | Status: SHIPPED | OUTPATIENT
Start: 2024-10-29

## 2024-10-29 ASSESSMENT — ENCOUNTER SYMPTOMS
SHORTNESS OF BREATH: 0
VOMITING: 0
NAUSEA: 0
DIARRHEA: 0

## 2024-10-29 NOTE — PROGRESS NOTES
OFFICE PROGRESS NOTE      SUBJECTIVE:        Patient ID:   Gilma Graham is a 69 y.o. female whopresents for   Chief Complaint   Patient presents with    Diabetes     3 mo f/u     Ear Injury     Left side piercing torn         HPI:   Patient is here to follow up on diabetes. Fasting blood sugars:not checking Midday blood sugars: not checking.  Patient checks blood glucose 0 times per day.  Patient is not following diabetic diet. Patient is a nonsmoker. Last ophthalmology visit: 4/2024. Patient is taking a daily statin.       Prior to Admission medications    Medication Sig Start Date End Date Taking? Authorizing Provider   metFORMIN (GLUCOPHAGE) 1000 MG tablet Take 1 tablet by mouth 2 times daily (with meals) 10/29/24  Yes Vonnie Ortiz MD   atorvastatin (LIPITOR) 20 MG tablet TAKE 1 TABLET DAILY 10/29/24  Yes Vonnie Ortiz MD   losartan-hydroCHLOROthiazide (HYZAAR) 100-25 MG per tablet Take 1 tablet by mouth daily 10/29/24  Yes Vonnie Ortiz MD   indomethacin (INDOCIN SR) 75 MG extended release capsule Take 1 capsule by mouth 2 times daily (with meals) 10/29/24  Yes Vonnie Ortiz MD   levothyroxine (SYNTHROID) 200 MCG tablet take 1 tablet by mouth once daily 10/29/24  Yes Vonnie Ortiz MD   levothyroxine (SYNTHROID) 150 MCG tablet Take 1 tablet by mouth three times a week 8/28/24  Yes Vonine Ortiz MD   allopurinol (ZYLOPRIM) 100 MG tablet Take 1 tablet by mouth daily 7/10/24  Yes Vonnie Ortiz MD   metroNIDAZOLE, TOPICAL, 0.75 % LOTN Apply to face BID 7/10/24  Yes Vonnie Ortiz MD   triamcinolone (KENALOG) 0.1 % cream Apply bid to affected areas prn 3/25/24  Yes Vonnie Ortiz MD   ketoconazole (NIZORAL) 2 % cream Apply topically daily. 12/12/23  Yes Vonnie Ortiz MD   Handicap [place] MISC by Does not apply route Unable to ambulate more than 60 feet without

## 2024-10-31 RX ORDER — LEVOTHYROXINE SODIUM 150 UG/1
150 TABLET ORAL
Qty: 150 TABLET | Refills: 1
Start: 2024-10-31

## 2024-11-20 ENCOUNTER — OFFICE VISIT (OUTPATIENT)
Dept: ENT CLINIC | Age: 69
End: 2024-11-20

## 2024-11-20 VITALS
SYSTOLIC BLOOD PRESSURE: 130 MMHG | TEMPERATURE: 97.7 F | DIASTOLIC BLOOD PRESSURE: 82 MMHG | OXYGEN SATURATION: 97 % | RESPIRATION RATE: 20 BRPM

## 2024-11-20 DIAGNOSIS — H61.23 BILATERAL IMPACTED CERUMEN: ICD-10-CM

## 2024-11-20 DIAGNOSIS — S01.312A TORN LEFT EAR LOBE, INITIAL ENCOUNTER: Primary | ICD-10-CM

## 2024-11-20 NOTE — PROGRESS NOTES
Professor:  SHOAIB HIGGINBOTHAM, [facility]        Gilma Graham  1955      I have discussed the case, including pertinent history and exam findings with the resident. I have seen and examined the patient and the key elements of the encounter have been performed by me.  I agree with the assessment, plan and orders as documented by the resident.      Patient here for follow up of medical problems.         Remainder of medical problems as per resident note.      YAN BAUMAN DO  11/30/24

## 2025-01-03 ENCOUNTER — PROCEDURE VISIT (OUTPATIENT)
Dept: ENT CLINIC | Age: 70
End: 2025-01-03

## 2025-01-03 VITALS
BODY MASS INDEX: 38.27 KG/M2 | OXYGEN SATURATION: 94 % | DIASTOLIC BLOOD PRESSURE: 84 MMHG | RESPIRATION RATE: 18 BRPM | HEIGHT: 65 IN | SYSTOLIC BLOOD PRESSURE: 205 MMHG | TEMPERATURE: 97.2 F | HEART RATE: 91 BPM

## 2025-01-03 DIAGNOSIS — S01.312A TORN LEFT EAR LOBE, INITIAL ENCOUNTER: Primary | ICD-10-CM

## 2025-01-03 RX ORDER — MUPIROCIN 20 MG/G
OINTMENT TOPICAL
Qty: 22 G | Refills: 0 | Status: SHIPPED | OUTPATIENT
Start: 2025-01-03

## 2025-01-03 NOTE — PROGRESS NOTES
Procedure:  Split earlobe repair    Indication: Patient presented with history of split left earlobe from trauma and wishes for repair.  The risks and benefits of repair in the office were discussed and she wishes to proceed.    Surgeon: Dr. La, Assistant Dr. Laws    And detailed description of procedure:  Patient was placed in a chair in a reclining position.  Left ear was then injected with 1% lidocaine with epinephrine 2 mL.  The anesthesia was given 10 minutes to take effect.    Left ear   a #15 blade was then used to incise the medial margins of the earlobe split. Once the wedge of the earlobe was removed, 5-0 fast gut suture was used to close the wedged area in a running, locking fashion.  There was minimal bleeding. A bacitracin was then placed over the incision.  Patient tolerated the procedure well.    Patient is to follow-up in 1 week.  She is was instructed to keep the earlobe clean and apply Bactroban ointment twice daily.            Gilma Graham  1955      I have discussed the case, including pertinent history and exam findings with the resident. I have seen and examined the patient and the key elements of the encounter have been performed by me.  I agree with the assessment, plan and orders as documented by the resident.      Patient here for follow up of medical problems.         Remainder of medical problems as per resident note.      YAN LA, DO  1/4/25

## 2025-01-10 ENCOUNTER — OFFICE VISIT (OUTPATIENT)
Dept: ENT CLINIC | Age: 70
End: 2025-01-10
Payer: MEDICARE

## 2025-01-10 VITALS — BODY MASS INDEX: 38.27 KG/M2 | HEIGHT: 65 IN | RESPIRATION RATE: 16 BRPM

## 2025-01-10 DIAGNOSIS — S01.312A TORN LEFT EAR LOBE, INITIAL ENCOUNTER: ICD-10-CM

## 2025-01-10 DIAGNOSIS — H61.23 BILATERAL IMPACTED CERUMEN: Primary | ICD-10-CM

## 2025-01-10 PROCEDURE — 3017F COLORECTAL CA SCREEN DOC REV: CPT | Performed by: OTOLARYNGOLOGY

## 2025-01-10 PROCEDURE — 99212 OFFICE O/P EST SF 10 MIN: CPT | Performed by: OTOLARYNGOLOGY

## 2025-01-10 PROCEDURE — 1123F ACP DISCUSS/DSCN MKR DOCD: CPT | Performed by: OTOLARYNGOLOGY

## 2025-01-10 PROCEDURE — 1090F PRES/ABSN URINE INCON ASSESS: CPT | Performed by: OTOLARYNGOLOGY

## 2025-01-10 PROCEDURE — 1159F MED LIST DOCD IN RCRD: CPT | Performed by: OTOLARYNGOLOGY

## 2025-01-10 PROCEDURE — G8417 CALC BMI ABV UP PARAM F/U: HCPCS | Performed by: OTOLARYNGOLOGY

## 2025-01-10 PROCEDURE — 1036F TOBACCO NON-USER: CPT | Performed by: OTOLARYNGOLOGY

## 2025-01-10 PROCEDURE — G8427 DOCREV CUR MEDS BY ELIG CLIN: HCPCS | Performed by: OTOLARYNGOLOGY

## 2025-01-10 PROCEDURE — G8400 PT W/DXA NO RESULTS DOC: HCPCS | Performed by: OTOLARYNGOLOGY

## 2025-01-10 NOTE — PROGRESS NOTES
[facility]  Dr. Avelino La D.O. Ms.Ed  Post-Op Follow Up        Patient Name:  Gilma Graham  :  1955     CHIEF C/O:    Chief Complaint   Patient presents with    Post-Op Check     1 wk po eft ear lobe repair withwedge flap  Couple of stitches that are bothering her       HISTORY OBTAINED FROM:  patient    HISTORY OF PRESENT ILLNESS:       Gilma is a 69 y.o. year old female, here today for follow up of:       Incision clean and intact.          Review of Systems   Constitutional:  Negative for chills and fever.   HENT:  Negative for congestion, ear discharge, hearing loss, postnasal drip, rhinorrhea, sore throat, tinnitus and voice change.    Respiratory:  Negative for cough and shortness of breath.    Cardiovascular:  Negative for chest pain and palpitations.   Gastrointestinal:  Negative for vomiting.   Skin:  Negative for rash.   Allergic/Immunologic: Negative for environmental allergies.   Neurological:  Negative for dizziness and headaches.   Hematological:  Does not bruise/bleed easily.   All other systems reviewed and are negative.      Resp 16   Ht 1.651 m (5' 5\")   BMI 38.27 kg/m²   Physical Exam      IMPRESSION/PLAN:  1. Bilateral impacted cerumen  2. Torn left ear lobe, initial encounter      Advised antibiotic ointment twice a day and for ear piercing 4 to 6 weeks     Follow up PRN     Dr. Avelino La D.O. Ms. Ed.  Otolaryngology Facial Plastic Surgery  :[facility]  Associate Clinical Professor:  SH[employer]IB HIGGINBOTH[employer]  [facility]

## 2025-02-05 ENCOUNTER — OFFICE VISIT (OUTPATIENT)
Dept: FAMILY MEDICINE CLINIC | Age: 70
End: 2025-02-05

## 2025-02-05 VITALS
DIASTOLIC BLOOD PRESSURE: 84 MMHG | BODY MASS INDEX: 38.32 KG/M2 | SYSTOLIC BLOOD PRESSURE: 138 MMHG | HEIGHT: 65 IN | RESPIRATION RATE: 20 BRPM | OXYGEN SATURATION: 97 % | WEIGHT: 230 LBS | HEART RATE: 75 BPM

## 2025-02-05 DIAGNOSIS — E11.9 TYPE 2 DIABETES MELLITUS WITHOUT COMPLICATION, WITHOUT LONG-TERM CURRENT USE OF INSULIN (HCC): Primary | ICD-10-CM

## 2025-02-05 DIAGNOSIS — E03.9 HYPOTHYROIDISM (ACQUIRED): ICD-10-CM

## 2025-02-05 DIAGNOSIS — E66.01 MORBID (SEVERE) OBESITY DUE TO EXCESS CALORIES: ICD-10-CM

## 2025-02-05 LAB
HBA1C MFR BLD: 7.6 %
T4 FREE: 1.6 NG/DL (ref 0.9–1.7)
TSH SERPL DL<=0.05 MIU/L-ACNC: 0.75 UIU/ML (ref 0.27–4.2)

## 2025-02-05 RX ORDER — LEVOTHYROXINE SODIUM 200 UG/1
TABLET ORAL
Qty: 90 TABLET | Refills: 1 | Status: SHIPPED | OUTPATIENT
Start: 2025-02-05

## 2025-02-05 RX ORDER — KETOCONAZOLE 20 MG/G
CREAM TOPICAL
Qty: 60 G | Refills: 5 | Status: SHIPPED | OUTPATIENT
Start: 2025-02-05

## 2025-02-05 RX ORDER — ALLOPURINOL 100 MG/1
100 TABLET ORAL DAILY
Qty: 90 TABLET | Refills: 1 | Status: SHIPPED | OUTPATIENT
Start: 2025-02-05

## 2025-02-05 NOTE — PROGRESS NOTES
[address], [address]   [place] [address]   [phone]   Vonnie Ortiz MD     Patient: Gilma Graham  Dateof Birth: 1955  Visit Date: 2/5/25    Gilma is a 69 y.o. year old female here today for   Chief Complaint   Patient presents with    Diabetes       HPI  History of Present Illness  The patient is a 69-year-old female who presents for a follow-up of diabetes.    She has not been monitoring her blood glucose levels at home. She reports no recent skin changes, eye problems, blurry vision, double vision, chest pain, shortness of breath, nausea, vomiting, diarrhea, or pain or burning with urination.    She has observed a correlation between her weight fluctuations and her thyroid medication dosage over the past 20 years. During her last visit, she had lost approximately 10 pounds but acknowledges a slight weight gain over the recent holiday period. She experienced excessive sleepiness, even while seated, which she attributes to a potential increase in her thyroid levels. Consequently, she resumed taking an additional dose of her thyroid medication, a regimen she has been following for the past month.    Supplemental Information  She recently visited her ophthalmologist.    Patient having difficulty losing weight.            Past medical, surgical, social and/or family historyreviewed, updated as needed, and are non-contributory (unless otherwise stated).  Medications, allergies, and problem list also reviewed and updated as needed in patient's record.     Current Outpatient Medications   Medication Sig Dispense Refill    allopurinol (ZYLOPRIM) 100 MG tablet Take 1 tablet by mouth daily 90 tablet 1    ketoconazole (NIZORAL) 2 % cream Apply topically daily. 60 g 5    levothyroxine (SYNTHROID) 200 MCG tablet take 1 tablet by mouth once daily 90 tablet 1    metFORMIN (GLUCOPHAGE) 1000 MG tablet Take 1 tablet by mouth 2 times daily (with meals) 180

## 2025-02-10 ENCOUNTER — TELEPHONE (OUTPATIENT)
Dept: ENT CLINIC | Age: 70
End: 2025-02-10

## 2025-02-10 NOTE — TELEPHONE ENCOUNTER
Patient called stating that her ear lobe came back open and she is wondering what she should do?  She was seen on Jan 3rd and had the procedure done to close her ear lobe.  Please Advise

## 2025-02-13 ENCOUNTER — TELEPHONE (OUTPATIENT)
Dept: ADMINISTRATIVE | Age: 70
End: 2025-02-13

## 2025-02-13 NOTE — TELEPHONE ENCOUNTER
Called patient to offer her a appt tomorrow she Declined appt. She is on the schedule for next Wednesday.

## 2025-02-13 NOTE — TELEPHONE ENCOUNTER
Patient is wanting to make an appt the week of March 9th for a 730a timeframe. I do not see anything till the end of May.    Please call patient back to advise.

## 2025-03-12 ENCOUNTER — OFFICE VISIT (OUTPATIENT)
Dept: ENT CLINIC | Age: 70
End: 2025-03-12
Payer: MEDICARE

## 2025-03-12 VITALS
SYSTOLIC BLOOD PRESSURE: 162 MMHG | HEIGHT: 65 IN | DIASTOLIC BLOOD PRESSURE: 98 MMHG | HEART RATE: 88 BPM | BODY MASS INDEX: 38.27 KG/M2

## 2025-03-12 DIAGNOSIS — S01.312D LACERATION OF LEFT EARLOBE, SUBSEQUENT ENCOUNTER: Primary | ICD-10-CM

## 2025-03-12 PROCEDURE — 3080F DIAST BP >= 90 MM HG: CPT | Performed by: OTOLARYNGOLOGY

## 2025-03-12 PROCEDURE — 99213 OFFICE O/P EST LOW 20 MIN: CPT | Performed by: OTOLARYNGOLOGY

## 2025-03-12 PROCEDURE — 1036F TOBACCO NON-USER: CPT | Performed by: OTOLARYNGOLOGY

## 2025-03-12 PROCEDURE — 1090F PRES/ABSN URINE INCON ASSESS: CPT | Performed by: OTOLARYNGOLOGY

## 2025-03-12 PROCEDURE — 3017F COLORECTAL CA SCREEN DOC REV: CPT | Performed by: OTOLARYNGOLOGY

## 2025-03-12 PROCEDURE — 3077F SYST BP >= 140 MM HG: CPT | Performed by: OTOLARYNGOLOGY

## 2025-03-12 PROCEDURE — 1123F ACP DISCUSS/DSCN MKR DOCD: CPT | Performed by: OTOLARYNGOLOGY

## 2025-03-12 PROCEDURE — G8400 PT W/DXA NO RESULTS DOC: HCPCS | Performed by: OTOLARYNGOLOGY

## 2025-03-12 PROCEDURE — G8428 CUR MEDS NOT DOCUMENT: HCPCS | Performed by: OTOLARYNGOLOGY

## 2025-03-12 PROCEDURE — G8417 CALC BMI ABV UP PARAM F/U: HCPCS | Performed by: OTOLARYNGOLOGY

## 2025-03-12 RX ORDER — AMOXICILLIN 500 MG/1
TABLET, FILM COATED ORAL
COMMUNITY
Start: 2025-03-10

## 2025-03-12 ASSESSMENT — ENCOUNTER SYMPTOMS
SORE THROAT: 0
VOMITING: 0
RHINORRHEA: 0
SHORTNESS OF BREATH: 0
VOICE CHANGE: 0
COUGH: 0

## 2025-03-12 NOTE — PROGRESS NOTES
[facility]  Dr. Avelino La D.O. Ms.Ed  Post-Op Follow Up        Patient Name:  Gilma Graham  :  1955     CHIEF C/O:    Chief Complaint   Patient presents with    Follow-up     ear lobe suture did not take,        HISTORY OBTAINED FROM:  patient    HISTORY OF PRESENT ILLNESS:       Gilma is a 69 y.o. year old female, here today for follow up of:       Incision clean and intact.      3/12/25: Pt returns after left ear lobule repair. Reports a few weeks after laceration repair dehiscence occurred. No pain. No drainage.    Review of Systems   Constitutional:  Negative for chills and fever.   HENT:  Negative for congestion, ear discharge, hearing loss, postnasal drip, rhinorrhea, sore throat, tinnitus and voice change.    Respiratory:  Negative for cough and shortness of breath.    Cardiovascular:  Negative for chest pain and palpitations.   Gastrointestinal:  Negative for vomiting.   Skin:  Negative for rash.   Allergic/Immunologic: Negative for environmental allergies.   Neurological:  Negative for dizziness and headaches.   Hematological:  Does not bruise/bleed easily.   All other systems reviewed and are negative.      BP (!) 162/98 (BP Site: Right Upper Arm, Patient Position: Sitting, BP Cuff Size: Medium Adult)   Pulse 88   Ht 1.651 m (5' 5\")   BMI 38.27 kg/m²   Physical Exam  HENT:      Ears:      Comments: Left lobule split with well healed edges          IMPRESSION/PLAN:  1. Laceration of left earlobe, subsequent encounter      Plan for in office left ear lobule repair with soft tissue rearrangement.      Will clear with insurance prior to procedure.     Dr. Avelino La D.O. Ms. Ed.  Otolaryngology Facial Plastic Surgery  :[facility]  Associate Clinical Professor:  [employer]  [facility]                  Gilma Graham  1955      I have discussed the case, including pertinent history and exam findings

## 2025-04-15 ENCOUNTER — TELEPHONE (OUTPATIENT)
Dept: ADMINISTRATIVE | Age: 70
End: 2025-04-15

## 2025-06-05 ENCOUNTER — OFFICE VISIT (OUTPATIENT)
Dept: FAMILY MEDICINE CLINIC | Age: 70
End: 2025-06-05

## 2025-06-05 VITALS
SYSTOLIC BLOOD PRESSURE: 136 MMHG | RESPIRATION RATE: 20 BRPM | DIASTOLIC BLOOD PRESSURE: 84 MMHG | HEIGHT: 65 IN | BODY MASS INDEX: 38.32 KG/M2 | HEART RATE: 88 BPM | WEIGHT: 230 LBS | OXYGEN SATURATION: 97 %

## 2025-06-05 DIAGNOSIS — E11.9 TYPE 2 DIABETES MELLITUS WITHOUT COMPLICATION, WITHOUT LONG-TERM CURRENT USE OF INSULIN (HCC): ICD-10-CM

## 2025-06-05 LAB — HBA1C MFR BLD: 7 %

## 2025-06-05 NOTE — PROGRESS NOTES
[address], [address]   [place] [address]   [phone]   Vonnie Ortiz MD     Patient: Gilma Graham  Dateof Birth: 1955  Visit Date: 6/5/25    Gilma is a 70 y.o. year old female here today for   Chief Complaint   Patient presents with    Diabetes       HPI  History of Present Illness  The patient presents for a diabetes follow-up. Her A1c is 7.0, down from 7.6.    She reports consuming cake daily for approximately 3 weeks following her birthday but has since resumed her dietary regimen. She adheres to a low-sugar, low-carbohydrate diet. She does not monitor her blood glucose levels at home. She is seeking a refill of her compound medication and metformin.    She experiences transient blurry vision upon waking in the morning, which resolves spontaneously. Her recent ophthalmological examination did not reveal any abnormalities.    She reports no skin rashes or changes, chest pain, shortness of breath, nausea, vomiting, diarrhea, or dysuria.    She has an enlarging hernia that is not causing pain or affecting bowel movements.    She broke her femur and still cannot walk without assistance. She uses a cane at home and a walker when she has to walk.    FAMILY HISTORY  Her  has to have his hip replaced. It runs in her family.              Past medical, surgical, social and/or family historyreviewed, updated as needed, and are non-contributory (unless otherwise stated).  Medications, allergies, and problem list also reviewed and updated as needed in patient's record.     Current Outpatient Medications   Medication Sig Dispense Refill    metFORMIN (GLUCOPHAGE) 1000 MG tablet Take 1 tablet by mouth 2 times daily (with meals) 180 tablet 1    amoxicillin (AMOXIL) 500 MG tablet TAKE 1 TABLET BY MOUTH THREE TIMES DAILY UNTIL GONE      allopurinol (ZYLOPRIM) 100 MG tablet Take 1 tablet by mouth daily 90 tablet 1    ketoconazole (NIZORAL) 2 % cream Apply

## 2025-06-08 NOTE — PROGRESS NOTES
OFFICE PROGRESS NOTE      SUBJECTIVE:        Patient ID:   Elizabeth Howard is a 76 y.o. female who presents for   Chief Complaint   Patient presents with    Diabetes         HPI:   Patient is here to follow up on diabetes. Fasting blood sugars:not checking Midday blood sugars: not checking. Patient checks blood glucose 0 times per day. Patient is following diabetic diet. Patient is a nonsmoker. Last ophthalmology visit: earlier this summer. Patient is taking a daily statin. Prior to Admission medications    Medication Sig Start Date End Date Taking? Authorizing Provider   metFORMIN (GLUCOPHAGE) 1000 MG tablet Take 1 tablet by mouth 2 times daily (with meals) 12/12/23  Yes Goodwin-Capers, Eva Cranker, MD   levothyroxine (SYNTHROID) 200 MCG tablet Take 1 tablets by mouth once daily 12/12/23  Yes Goodwin-Capers, Eva Cranker, MD   levothyroxine (SYNTHROID) 150 MCG tablet Take 1 tablet by mouth three times a week 12/13/23  Yes Goodwin-Capers, Eva Cranker, MD   atorvastatin (LIPITOR) 20 MG tablet TAKE 1 TABLET DAILY 12/12/23  Yes Goodwin-Capers, Eva Cranker, MD   allopurinol (ZYLOPRIM) 100 MG tablet Take 1 tablet by mouth daily 12/12/23  Yes Goodwin-Capers, Eva Cranker, MD   losartan-hydroCHLOROthiazide [facility]) 100-25 MG per tablet Take 1 tablet by mouth daily 12/12/23  Yes Goodwin-Capers, Eva Cranker, MD   ketoconazole (NIZORAL) 2 % cream Apply topically daily. 12/12/23  Yes Goodwin-Capers, Eva Cranker, MD   indomethacin (INDOCIN SR) 75 MG extended release capsule Take 1 capsule by mouth 2 times daily (with meals) 12/12/23  Yes Goodwin-Capers, Eva Cranker, MD   metroNIDAZOLE, TOPICAL, 0.75 % LOTN Apply to face BID 12/12/23  Yes Goodwin-Capers, Eva Cranker, MD   triamcinolone (KENALOG) 0.1 % cream Apply bid to affected areas prn 12/12/23  Yes Goodwin-Capers, Eva Cranker, MD   traMADol (ULTRAM) 50 MG tablet Take 1 tablet by mouth every 6 hours as needed for Pain for up to 7 days. Intended supply: 7 days.  Take lowest 100

## 2025-06-09 DIAGNOSIS — Z12.39 SCREENING BREAST EXAMINATION: Primary | ICD-10-CM

## 2025-06-19 ENCOUNTER — PATIENT MESSAGE (OUTPATIENT)
Dept: FAMILY MEDICINE CLINIC | Age: 70
End: 2025-06-19

## 2025-06-19 DIAGNOSIS — E03.9 HYPOTHYROIDISM (ACQUIRED): ICD-10-CM

## 2025-06-19 DIAGNOSIS — I10 ESSENTIAL HYPERTENSION: ICD-10-CM

## 2025-06-24 RX ORDER — LEVOTHYROXINE SODIUM 200 UG/1
TABLET ORAL
Qty: 90 TABLET | Refills: 1 | Status: SHIPPED | OUTPATIENT
Start: 2025-06-24

## 2025-06-24 RX ORDER — ALLOPURINOL 100 MG/1
100 TABLET ORAL DAILY
Qty: 90 TABLET | Refills: 1 | Status: SHIPPED | OUTPATIENT
Start: 2025-06-24

## 2025-06-24 RX ORDER — LOSARTAN POTASSIUM AND HYDROCHLOROTHIAZIDE 25; 100 MG/1; MG/1
1 TABLET ORAL DAILY
Qty: 90 TABLET | Refills: 1 | Status: SHIPPED | OUTPATIENT
Start: 2025-06-24

## 2025-08-26 ENCOUNTER — HOSPITAL ENCOUNTER (OUTPATIENT)
Dept: MAMMOGRAPHY | Age: 70
Discharge: HOME | End: 2025-08-28

## 2025-08-26 ENCOUNTER — OFFICE VISIT (OUTPATIENT)
Dept: ORTHOPEDIC SURGERY | Age: 70
End: 2025-08-26

## 2025-08-26 VITALS
HEART RATE: 83 BPM | TEMPERATURE: 97.8 F | SYSTOLIC BLOOD PRESSURE: 152 MMHG | RESPIRATION RATE: 16 BRPM | DIASTOLIC BLOOD PRESSURE: 83 MMHG | OXYGEN SATURATION: 98 %

## 2025-08-26 DIAGNOSIS — Z96.651 HISTORY OF TOTAL KNEE ARTHROPLASTY, RIGHT: ICD-10-CM

## 2025-08-26 DIAGNOSIS — Z96.652 HISTORY OF TOTAL KNEE ARTHROPLASTY, LEFT: Primary | ICD-10-CM

## 2025-08-26 DIAGNOSIS — Z96.659 MECHANICAL LOOSENING OF PROSTHETIC KNEE, SUBSEQUENT ENCOUNTER: ICD-10-CM

## 2025-08-26 DIAGNOSIS — Z12.39 SCREENING BREAST EXAMINATION: ICD-10-CM

## 2025-08-26 DIAGNOSIS — T84.038D MECHANICAL LOOSENING OF PROSTHETIC KNEE, SUBSEQUENT ENCOUNTER: ICD-10-CM

## 2025-08-27 PROBLEM — T84.038D ASEPTIC LOOSENING OF PROSTHETIC KNEE, SUBSEQUENT ENCOUNTER: Status: ACTIVE | Noted: 2025-08-27

## 2025-08-27 PROBLEM — Z96.659 ASEPTIC LOOSENING OF PROSTHETIC KNEE, SUBSEQUENT ENCOUNTER: Status: ACTIVE | Noted: 2025-08-27

## 2025-09-02 ENCOUNTER — PATIENT MESSAGE (OUTPATIENT)
Dept: FAMILY MEDICINE CLINIC | Age: 70
End: 2025-09-02

## 2025-09-02 DIAGNOSIS — Z12.31 ENCOUNTER FOR SCREENING MAMMOGRAM FOR MALIGNANT NEOPLASM OF BREAST: Primary | ICD-10-CM

## (undated) DEVICE — Device

## (undated) DEVICE — MASK,FACE,MAXFLUIDPROTECT,SHIELD/ERLPS: Brand: MEDLINE

## (undated) DEVICE — DUAL HOSE W/CPC CONNECTORS: Brand: A.T.S.® TOURNIQUET SYSTEM

## (undated) DEVICE — KENDALL 450 SERIES MONITORING FOAM ELECTRODE - RECTANGULAR SHAPE ( 3/PK): Brand: KENDALL

## (undated) DEVICE — SPONGE,LAP,8"X36",XRAY,ST,5/PK,40PK/CS: Brand: MEDLINE INDUSTRIES, INC.

## (undated) DEVICE — LOWER EXT HIP DRAPE: Brand: MEDLINE INDUSTRIES, INC.

## (undated) DEVICE — SCREW BNE L50MM DIA4.5MM PROX CORT TIB S STL ST LOK FULL
Type: IMPLANTABLE DEVICE | Site: FEMUR | Status: NON-FUNCTIONAL
Removed: 2022-08-31

## (undated) DEVICE — TUBING, SUCTION, 1/4" X 10', STRAIGHT: Brand: MEDLINE

## (undated) DEVICE — KIT BEDSIDE REVITAL OX 500ML

## (undated) DEVICE — THIN OFFSET (13.3 X 0.38 X 42.0MM)

## (undated) DEVICE — PRECISION THIN (9.0 X 0.38 X 31.0MM)

## (undated) DEVICE — HANDPIECE SET WITH BONE CLEANING TIP AND SUCTION TUBE: Brand: INTERPULSE

## (undated) DEVICE — FORCEPS BX L240CM JAW DIA2.8MM L CAP W/ NDL MIC MESH TOOTH

## (undated) DEVICE — ZIMMER® STERILE DISPOSABLE TOURNIQUET CUFF WITH PROTECTIVE SLEEVE AND PLC, DUAL PORT, SINGLE BLADDER, 34 IN. (86 CM)

## (undated) DEVICE — GOWN ISOLATN REG YEL M WT MULTIPLY SIDETIE LEV 2

## (undated) DEVICE — TUBING SUCT 12FR MAL ALUM SHFT FN CAP VENT UNIV CONN W/ OBT

## (undated) DEVICE — SPONGE GZ 4IN 4IN 4 PLY N WVN AVANT

## (undated) DEVICE — CONTAINER SPEC COLL 960ML POLYPR TRIANG GRAD INTAKE/OUTPUT

## (undated) DEVICE — GLOVE ORTHO 8   MSG9480

## (undated) DEVICE — LUBRICANT SURG JELLY ST BACTER TUBE 4.25OZ

## (undated) DEVICE — Device: Brand: DEFENDO VALVE AND CONNECTOR KIT

## (undated) DEVICE — ELECTRODE PT RET AD L9FT HI MOIST COND ADH HYDRGEL CORDED

## (undated) DEVICE — TOTAL TRAY, DB, 100% SILI FOLEY, 16FR 10: Brand: MEDLINE

## (undated) DEVICE — SOLUTION IRRIG 3000ML 0.9% SOD CHL USP UROMATIC PLAS CONT

## (undated) DEVICE — GLOVE ORANGE PI 8   MSG9080

## (undated) DEVICE — BIOVAC DIRECT SUCTION DEVICE 00711512

## (undated) DEVICE — BANDAGE COMPR W6INXL12FT SMOOTH FOR LIMB EXSANG ESMARCH

## (undated) DEVICE — GOWN,BREATHABLE SLV,AURORA,XLG,STRL: Brand: MEDLINE

## (undated) DEVICE — DRESSING HYDROFIBER AQUACEL AG ADVANTAGE 3.5X12 IN

## (undated) DEVICE — 6 X 9  1.75MIL 4-WALL LABGUARD: Brand: MINIGRIP COMMERCIAL LLC

## (undated) DEVICE — 2108 SERIES SAGITTAL BLADE, NO OFFSET  (12.4 X 1.19 X 82.1MM)

## (undated) DEVICE — DRESSING HYDROFIBER AQUACEL AG ADVANTAGE 3.5X10 IN